# Patient Record
Sex: MALE | Race: WHITE | NOT HISPANIC OR LATINO | Employment: OTHER | URBAN - METROPOLITAN AREA
[De-identification: names, ages, dates, MRNs, and addresses within clinical notes are randomized per-mention and may not be internally consistent; named-entity substitution may affect disease eponyms.]

---

## 2019-02-27 ENCOUNTER — OFFICE VISIT (OUTPATIENT)
Dept: FAMILY MEDICINE CLINIC | Facility: CLINIC | Age: 54
End: 2019-02-27
Payer: COMMERCIAL

## 2019-02-27 VITALS
RESPIRATION RATE: 18 BRPM | OXYGEN SATURATION: 96 % | WEIGHT: 266.2 LBS | HEART RATE: 74 BPM | TEMPERATURE: 98.6 F | SYSTOLIC BLOOD PRESSURE: 122 MMHG | DIASTOLIC BLOOD PRESSURE: 72 MMHG

## 2019-02-27 DIAGNOSIS — H61.22 IMPACTED CERUMEN OF LEFT EAR: ICD-10-CM

## 2019-02-27 DIAGNOSIS — E78.2 MIXED HYPERLIPIDEMIA: ICD-10-CM

## 2019-02-27 DIAGNOSIS — L30.9 ECZEMA, UNSPECIFIED TYPE: Primary | ICD-10-CM

## 2019-02-27 DIAGNOSIS — Z13.1 SCREENING FOR DIABETES MELLITUS: ICD-10-CM

## 2019-02-27 DIAGNOSIS — Z12.5 SCREENING FOR PROSTATE CANCER: ICD-10-CM

## 2019-02-27 DIAGNOSIS — Z12.11 SCREENING FOR COLON CANCER: ICD-10-CM

## 2019-02-27 DIAGNOSIS — F41.9 ANXIETY: ICD-10-CM

## 2019-02-27 DIAGNOSIS — Z13.220 NEED FOR LIPID SCREENING: ICD-10-CM

## 2019-02-27 DIAGNOSIS — Z11.59 NEED FOR HEPATITIS C SCREENING TEST: ICD-10-CM

## 2019-02-27 PROCEDURE — 1036F TOBACCO NON-USER: CPT | Performed by: FAMILY MEDICINE

## 2019-02-27 PROCEDURE — 69210 REMOVE IMPACTED EAR WAX UNI: CPT | Performed by: FAMILY MEDICINE

## 2019-02-27 PROCEDURE — 99204 OFFICE O/P NEW MOD 45 MIN: CPT | Performed by: FAMILY MEDICINE

## 2019-02-27 RX ORDER — BETAMETHASONE DIPROPIONATE 0.5 MG/G
OINTMENT TOPICAL 2 TIMES DAILY
Qty: 30 G | Refills: 0 | Status: SHIPPED | OUTPATIENT
Start: 2019-02-27 | End: 2019-03-06 | Stop reason: SDUPTHER

## 2019-02-27 RX ORDER — HYDROXYZINE HYDROCHLORIDE 10 MG/1
25 TABLET, FILM COATED ORAL
COMMUNITY
End: 2019-02-27 | Stop reason: SDUPTHER

## 2019-02-27 RX ORDER — HYDROXYZINE HYDROCHLORIDE 25 MG/1
25 TABLET, FILM COATED ORAL
Qty: 30 TABLET | Refills: 2 | Status: SHIPPED | OUTPATIENT
Start: 2019-02-27 | End: 2019-03-04 | Stop reason: SDUPTHER

## 2019-02-27 RX ORDER — FLUOXETINE HYDROCHLORIDE 20 MG/1
20 CAPSULE ORAL DAILY
COMMUNITY
Start: 2018-04-26 | End: 2019-02-27 | Stop reason: SDUPTHER

## 2019-02-27 RX ORDER — BETAMETHASONE DIPROPIONATE 0.5 MG/G
OINTMENT TOPICAL 2 TIMES DAILY
Qty: 30 G | Refills: 0 | Status: SHIPPED | OUTPATIENT
Start: 2019-02-27 | End: 2019-02-27 | Stop reason: SDUPTHER

## 2019-02-27 RX ORDER — ROSUVASTATIN CALCIUM 5 MG/1
5 TABLET, COATED ORAL DAILY
Qty: 30 TABLET | Refills: 2 | Status: SHIPPED | OUTPATIENT
Start: 2019-02-27 | End: 2019-03-05 | Stop reason: SDUPTHER

## 2019-02-27 RX ORDER — TRIAMCINOLONE ACETONIDE 1 MG/G
CREAM TOPICAL 2 TIMES DAILY
COMMUNITY
End: 2019-03-21

## 2019-02-27 RX ORDER — ROSUVASTATIN CALCIUM 5 MG/1
5 TABLET, COATED ORAL DAILY
COMMUNITY
Start: 2018-04-26 | End: 2019-02-27 | Stop reason: SDUPTHER

## 2019-02-27 RX ORDER — FLUOXETINE HYDROCHLORIDE 40 MG/1
40 CAPSULE ORAL DAILY
Qty: 30 CAPSULE | Refills: 1 | Status: SHIPPED | OUTPATIENT
Start: 2019-02-27 | End: 2019-09-18

## 2019-02-27 RX ORDER — FLUOXETINE 10 MG/1
10 CAPSULE ORAL
COMMUNITY
Start: 2018-04-26 | End: 2019-02-27 | Stop reason: ALTCHOICE

## 2019-02-27 RX ORDER — FLUOXETINE HYDROCHLORIDE 40 MG/1
40 CAPSULE ORAL DAILY
Qty: 30 CAPSULE | Refills: 1 | Status: SHIPPED | OUTPATIENT
Start: 2019-02-27 | End: 2019-02-27 | Stop reason: SDUPTHER

## 2019-02-27 NOTE — PROGRESS NOTES
Assessment/Plan:    No problem-specific Assessment & Plan notes found for this encounter  Diagnoses and all orders for this visit:    Eczema, unspecified type  After discussing risks and benefits of medication along with side effects will refill hydroxyzine and start betamethasone at this time  -     hydrOXYzine HCL (ATARAX) 25 mg tablet; Take 1 tablet (25 mg total) by mouth daily at bedtime for 30 days  -     betamethasone, augmented, (DIPROLENE) 0 05 % ointment; Apply topically 2 (two) times a day    Anxiety  After discussing risks and benefits of medication along with side effects  Will increase Prozac to 40 mg p o  Once daily   -     FLUoxetine (PROzac) 40 MG capsule; Take 1 capsule (40 mg total) by mouth daily for 30 days    Mixed hyperlipidemia  -     rosuvastatin (CRESTOR) 5 mg tablet; Take 1 tablet (5 mg total) by mouth daily for 30 days    Screening for diabetes mellitus  -     Comprehensive metabolic panel; Future    Need for lipid screening  -     Lipid panel; Future    Need for hepatitis C screening test  -     Hepatitis C antibody; Future    Screening for prostate cancer  -     PSA, Total Screen; Future    Screening for colon cancer  -     Cologuard; Future    Impacted cerumen of left ear  See Procedure note  Other orders  -     Discontinue: FLUoxetine (PROZAC) 20 mg capsule; Take 20 mg by mouth daily  -     Discontinue: rosuvastatin (CRESTOR) 5 mg tablet; Take 5 mg by mouth daily  -     Discontinue: FLUoxetine (PROzac) 10 mg capsule; Take 10 mg by mouth  -     Discontinue: hydrOXYzine HCL (ATARAX) 10 mg tablet; Take 25 mg by mouth daily at bedtime  -     triamcinolone (KENALOG) 0 1 % cream; Apply topically 2 (two) times a day      Follow up in 1 month or as needed    Subjective:      Patient ID: Shanika Walters is a 52 y o  male  Patient is here to establish care  He has a history of eczema and currently has a rash on his chest bilateral arms as well as back    He has taken medications in the past including various creams and topical treatments  Most recently he tried triamcinolone cream which works somewhat for his symptoms  He has itching  associated with the eczema and takes hydroxyzine for his symptoms  He has a history of hyperlipidemia and takes cholesterol medication at this time  He denies any side effects from them  He has a history of anxiety on he does take Prozac 30 mg p o  Once daily for his symptoms at this time  He said that his anxiety has been increased recently and would like his medication to be adjusted  [place] [facility] He is also having left ear complaints, has decreased hearing from his left ear he does have a history of ear wax in the past   He has been applying hydrogen peroxide with only minimum relief of his symptoms  The following portions of the patient's history were reviewed and updated as appropriate:   He  has a past medical history of Anxiety, Eczema, and Hypercholesterolemia  He   Patient Active Problem List    Diagnosis Date Noted    Eczema 02/27/2019    Anxiety 02/27/2019    Mixed hyperlipidemia 02/27/2019    Screening for diabetes mellitus 02/27/2019    Need for lipid screening 02/27/2019    Need for hepatitis C screening test 02/27/2019    Screening for prostate cancer 02/27/2019    Screening for colon cancer 02/27/2019    Impacted cerumen of left ear 02/27/2019     He  has no past surgical history on file  His family history includes Cancer in his father and mother; Coronary artery disease in his father and mother; Mental illness in his mother; Substance Abuse in his father and mother  He  reports that he has never smoked  He has never used smokeless tobacco  He reports that he drank alcohol  He reports that he does not use drugs    Current Outpatient Medications   Medication Sig Dispense Refill    FLUoxetine (PROzac) 40 MG capsule Take 1 capsule (40 mg total) by mouth daily for 30 days 30 capsule 1    hydrOXYzine HCL (ATARAX) 25 mg tablet Take 1 tablet (25 mg total) by mouth daily at bedtime for 30 days 30 tablet 2    rosuvastatin (CRESTOR) 5 mg tablet Take 1 tablet (5 mg total) by mouth daily for 30 days 30 tablet 2    triamcinolone (KENALOG) 0 1 % cream Apply topically 2 (two) times a day      betamethasone, augmented, (DIPROLENE) 0 05 % ointment Apply topically 2 (two) times a day 30 g 0     No current facility-administered medications for this visit  Current Outpatient Medications on File Prior to Visit   Medication Sig    triamcinolone (KENALOG) 0 1 % cream Apply topically 2 (two) times a day    [DISCONTINUED] FLUoxetine (PROzac) 10 mg capsule Take 10 mg by mouth    [DISCONTINUED] FLUoxetine (PROZAC) 20 mg capsule Take 20 mg by mouth daily    [DISCONTINUED] hydrOXYzine HCL (ATARAX) 10 mg tablet Take 25 mg by mouth daily at bedtime    [DISCONTINUED] rosuvastatin (CRESTOR) 5 mg tablet Take 5 mg by mouth daily     No current facility-administered medications on file prior to visit  He is allergic to medical tape       Review of Systems   Constitutional: Negative for activity change, appetite change, fatigue and fever  HENT: Positive for hearing loss  Negative for congestion and ear discharge  Respiratory: Negative for cough and shortness of breath  Cardiovascular: Negative for chest pain and palpitations  Gastrointestinal: Negative for diarrhea and nausea  Musculoskeletal: Negative for arthralgias and back pain  Skin: Positive for color change and rash  Neurological: Negative for dizziness and headaches  Psychiatric/Behavioral: Positive for agitation  Negative for behavioral problems  The patient is nervous/anxious and is hyperactive  Objective:      /72 (BP Location: Left arm, Patient Position: Sitting, Cuff Size: Adult)   Pulse 74   Temp 98 6 °F (37 °C) (Tympanic)   Resp 18   Wt 121 kg (266 lb 3 2 oz)   SpO2 96%          Physical Exam   Constitutional: He is oriented to person, place, and time   He appears well-developed and well-nourished  No distress  HENT:   Left TM not visualized due to ear wax  Right ear canal and TM normal   Eyes: Pupils are equal, round, and reactive to light  No scleral icterus  Cardiovascular: Normal rate, regular rhythm and normal heart sounds  No murmur heard  Pulmonary/Chest: Effort normal and breath sounds normal  No respiratory distress  He has no wheezes  Abdominal: Soft  Bowel sounds are normal  He exhibits no distension  There is no tenderness  Neurological: He is alert and oriented to person, place, and time  Skin: Skin is warm and dry  Rash noted  He is not diaphoretic  There is erythema  Psychiatric: He has a normal mood and affect  Ear cerumen removal  Date/Time: 2/27/2019 4:32 PM  Performed by: Valarie Mora MD  Authorized by: Valarie Mora MD     Patient location:  Clinic  Other Assisting Provider: No    Consent:     Consent obtained:  Verbal and written    Consent given by:  Patient    Risks discussed:  Bleeding, infection, incomplete removal, dizziness, pain and TM perforation    Alternatives discussed:  No treatment  Universal protocol:     Patient identity confirmed:  Verbally with patient  Procedure details:     Local anesthetic:  None    Location:  L ear    Procedure type: curette      Approach:  External and internal  Sedation:     Sedation type: Anxiolysis  Post-procedure details:     Complication:  None    Hearing quality:  Improved    Patient tolerance of procedure:   Tolerated well, no immediate complications

## 2019-03-04 DIAGNOSIS — L30.9 ECZEMA, UNSPECIFIED TYPE: ICD-10-CM

## 2019-03-04 RX ORDER — HYDROXYZINE HYDROCHLORIDE 25 MG/1
25 TABLET, FILM COATED ORAL
Qty: 90 TABLET | Refills: 3 | Status: SHIPPED | OUTPATIENT
Start: 2019-03-04 | End: 2019-09-18 | Stop reason: SDUPTHER

## 2019-03-05 DIAGNOSIS — E78.2 MIXED HYPERLIPIDEMIA: ICD-10-CM

## 2019-03-05 RX ORDER — ROSUVASTATIN CALCIUM 5 MG/1
5 TABLET, COATED ORAL DAILY
Qty: 30 TABLET | Refills: 2 | Status: SHIPPED | OUTPATIENT
Start: 2019-03-05 | End: 2019-03-06 | Stop reason: SDUPTHER

## 2019-03-05 RX ORDER — ROSUVASTATIN CALCIUM 5 MG/1
5 TABLET, COATED ORAL DAILY
Qty: 90 TABLET | Refills: 2 | OUTPATIENT
Start: 2019-03-05

## 2019-03-06 DIAGNOSIS — E78.2 MIXED HYPERLIPIDEMIA: ICD-10-CM

## 2019-03-06 DIAGNOSIS — L30.9 ECZEMA, UNSPECIFIED TYPE: ICD-10-CM

## 2019-03-06 RX ORDER — ROSUVASTATIN CALCIUM 5 MG/1
5 TABLET, COATED ORAL DAILY
Qty: 90 TABLET | Refills: 3 | Status: SHIPPED | OUTPATIENT
Start: 2019-03-06 | End: 2019-03-08 | Stop reason: SDUPTHER

## 2019-03-06 RX ORDER — BETAMETHASONE DIPROPIONATE 0.5 MG/G
OINTMENT TOPICAL 2 TIMES DAILY
Qty: 30 G | Refills: 0 | Status: SHIPPED | OUTPATIENT
Start: 2019-03-06 | End: 2020-09-23

## 2019-03-08 DIAGNOSIS — E78.2 MIXED HYPERLIPIDEMIA: ICD-10-CM

## 2019-03-08 RX ORDER — ROSUVASTATIN CALCIUM 5 MG/1
5 TABLET, COATED ORAL DAILY
Qty: 90 TABLET | Refills: 3 | Status: SHIPPED | OUTPATIENT
Start: 2019-03-08 | End: 2019-09-18 | Stop reason: SDUPTHER

## 2019-03-21 ENCOUNTER — OFFICE VISIT (OUTPATIENT)
Dept: DERMATOLOGY | Facility: CLINIC | Age: 54
End: 2019-03-21
Payer: COMMERCIAL

## 2019-03-21 VITALS — HEIGHT: 76 IN | BODY MASS INDEX: 32.42 KG/M2 | TEMPERATURE: 97.8 F | WEIGHT: 266.2 LBS

## 2019-03-21 DIAGNOSIS — D22.9 MULTIPLE BENIGN MELANOCYTIC NEVI: ICD-10-CM

## 2019-03-21 DIAGNOSIS — L20.89 OTHER ATOPIC DERMATITIS: Primary | ICD-10-CM

## 2019-03-21 LAB
ALBUMIN SERPL-MCNC: 4.3 G/DL (ref 3.5–5.5)
ALBUMIN/GLOB SERPL: 2.3 {RATIO} (ref 1.2–2.2)
ALP SERPL-CCNC: 54 IU/L (ref 39–117)
ALT SERPL-CCNC: 16 IU/L (ref 0–44)
AST SERPL-CCNC: 16 IU/L (ref 0–40)
BILIRUB SERPL-MCNC: 0.8 MG/DL (ref 0–1.2)
BUN SERPL-MCNC: 20 MG/DL (ref 6–24)
BUN/CREAT SERPL: 27 (ref 9–20)
CALCIUM SERPL-MCNC: 8.8 MG/DL (ref 8.7–10.2)
CHLORIDE SERPL-SCNC: 108 MMOL/L (ref 96–106)
CHOLEST SERPL-MCNC: 154 MG/DL (ref 100–199)
CO2 SERPL-SCNC: 23 MMOL/L (ref 20–29)
CREAT SERPL-MCNC: 0.73 MG/DL (ref 0.76–1.27)
GLOBULIN SER-MCNC: 1.9 G/DL (ref 1.5–4.5)
GLUCOSE SERPL-MCNC: 101 MG/DL (ref 65–99)
HCV AB S/CO SERPL IA: 0.1 S/CO RATIO (ref 0–0.9)
HDLC SERPL-MCNC: 38 MG/DL
LDLC SERPL CALC-MCNC: 94 MG/DL (ref 0–99)
POTASSIUM SERPL-SCNC: 4.8 MMOL/L (ref 3.5–5.2)
PROT SERPL-MCNC: 6.2 G/DL (ref 6–8.5)
PSA SERPL-MCNC: 1.1 NG/ML (ref 0–4)
SL AMB EGFR AFRICAN AMERICAN: 121 ML/MIN/1.73
SL AMB EGFR NON AFRICAN AMERICAN: 104 ML/MIN/1.73
SL AMB VLDL CHOLESTEROL CALC: 22 MG/DL (ref 5–40)
SODIUM SERPL-SCNC: 142 MMOL/L (ref 134–144)
TRIGL SERPL-MCNC: 109 MG/DL (ref 0–149)

## 2019-03-21 PROCEDURE — 99202 OFFICE O/P NEW SF 15 MIN: CPT | Performed by: DERMATOLOGY

## 2019-03-21 RX ORDER — CLOBETASOL PROPIONATE 0.5 MG/G
CREAM TOPICAL
Qty: 120 G | Refills: 1 | Status: SHIPPED | OUTPATIENT
Start: 2019-03-21 | End: 2019-09-18 | Stop reason: ALTCHOICE

## 2019-03-21 NOTE — PATIENT INSTRUCTIONS
Start moisturizing a couple times a day with a topical cream  (Anything cream base)     -Eucerin Cream    -Cetaphil Cream, CeraVe cream     Start Eucrisa Ointment twice a day Monday to Friday for two months    Start Clobetasol Cream twice a day daily for two weeks, decrease to twice a day weekends only Saturday and Sunday

## 2019-03-21 NOTE — PROGRESS NOTES
[address] [facility] Note     Patient Name: Veronique Moura  FFGDB'M Date: 3/21/2019    Today's Chief Concerns:  [place] Concern #1:  Rash    Past Medical History:  Have you ever had or currently have any of the following medical conditions or treatments? · HIV/AIDS: No  · Hepatitis B: No  · Hepatitis C: No   · Diabetes: No  · Tuberculosis: No  · Biologic Therapy/Chemotherapy: No  · Organ or Bone Marrow Transplantation: No  · Radiation Treatment: No  · Cancer (If Yes, which types)- No      Have you ever had any of the following skin conditions? · Melanoma? (If Yes, please provide more detail)- No  · Basal Cell Carcinoma: No  · Squamous Cell Carcinoma: No  · Sebaceous Cell Carcinoma: No  · Merkel Cell Carcinoma: No  · Angiosarcoma: No  · Blistering Sunburns: No  · Eczema: No  · Psoriasis: No    Social History:    What is your current Smoking Status? Never smoker    What is/was your primary occupation?      What are your hobbies/past-times? Fishing     Family history:  Do any of your "first degree relatives" (parent, brother, sister, or child) have any of the following conditions? · Melanoma? (If Yes, which relatives?) No  · Eczema: No  · Asthma: Yes  · Hay Fever/Seasonal Allergies: Yes  · Psoriasis: No  · Arthritis: No  · Thyroid Problems: No  · Lupus/Connective Tissue Disease: No  · Diabetes: No  · Stroke: No  · Blood Clots: No  · IBD/Crohn's/Ulcerative Colitis: No  · Vitiligo: No  · Scarring/Keloids: No  · Severe Acne: Yes  · Pancreatic Cancer: Yes  · Other known Skin Condition? If Yes, what condition and which relatives?   No    Current Medications:    Current Outpatient Medications:     betamethasone, augmented, (DIPROLENE) 0 05 % ointment, Apply topically 2 (two) times a day, Disp: 30 g, Rfl: 0    FLUoxetine (PROzac) 40 MG capsule, Take 1 capsule (40 mg total) by mouth daily for 30 days, Disp: 30 capsule, Rfl: 1    hydrOXYzine HCL (ATARAX) 25 mg tablet, Take 1 tablet (25 mg total) by mouth daily at bedtime for 30 days, Disp: 90 tablet, Rfl: 3    rosuvastatin (CRESTOR) 5 mg tablet, Take 1 tablet (5 mg total) by mouth daily for 30 days, Disp: 90 tablet, Rfl: 3    Specific Alerts:    Are you pregnant or planning to become pregant? NA    Are you currently or planning to be nursing or breast feeding? NA    Allergies   Allergen Reactions    Medical Tape        May we call your Preferred Phone number to discuss your specific medical information? Yes    May we leave a detailed message that includes your specific medical information? Yes    Have you traveled outside of the [facility] in the past 3 months? No    Do you currently have a pacemaker or defibrillator? No    Do you have any artificial heart valves, joints, plates, screws, rods, stents, pins, etc? No   - If Yes, were any placed within the last 2 years? Do you require any medications prior to a surgical procedure? No   - If Yes, for which procedure? n/a   - If Yes, what medications to you require? n/a    Are you taking any medications that cause you to bleed more easily ("blood thinners") No    Have you ever experienced a rapid heartbeat with epinephrine? No    Have you ever been treated with "gold" (gold sodium thiomalate) therapy? No      Review of Systems:  Have you recently had or currently have any of the following?     · Fever or chills: No  · Night Sweats: No  · Headaches: No  · Weight Gain: No  · Weight Loss: No  · Blurry Vision: No  · Nausea: No  · Vomiting: No  · Diarrhea: No  · Blood in Stool: No  · Abdominal Pain: No  · Itchy Skin: Yes  · Painful Joints: No  · Swollen Joints: Yes  · Muscle Pain: No  · Irregular Mole: No  · Sun Burn: No  · Dry Skin: Yes  · Skin Color Changes: No  · Scar or Keloid: No  · Cold Sores/Fever Blisters: No  · Bacterial Infections/MRSA: No  · Anxiety: Yes  · Depression: No  · Suicidal or Homicidal Thoughts: No      FULL ORGAN SYSTEM SKIN EXAM (SKIN)  Hair, Scalp, Ears, Face Normal except as noted below in Assessment   Neck, Cervical Chain Nodes Normal except as noted below in Assessment   Right Arm/Hand/Fingers Normal except as noted below in Assessment   Left Arm/Hand/Fingers Normal except as noted below in Assessment   Chest/Breasts/Axillae Normal except as noted below in Assessment   Abdomen, Umbilicus Normal except as noted below in Assessment   Back/Spine Normal except as noted below in Assessment   Groin/Genitalia/Buttocks    Right Leg, Foot, Toes    Left Leg, Foot, Toes       Was chaperone present for exam? Yes    Did the patient specifically ask the Dermatologist to NOT examine "under-the-bra" or to "not remove the shirt" for privacy's sake? Yes    Did the patient specifically ask the Dermatologist to NOT examine "under-the-underwear" for privacy's sake? Yes    Did Dermatologist specifically  patient on the importance of a full skin exam to be sure that nothing is missed clinically? Yes    Vitals:    03/21/19 1125   Temp: 97 8 °F (36 6 °C)   TempSrc: Tympanic   Weight: 121 kg (266 lb 3 2 oz)   Height: 6' 3 6" (1 92 m)         ASSESSMENT AND PLAN BY DIAGNOSIS    1  Atopic Dermatitis (commonly referred to as [place])    PHYSICAL EXAM:   Anatomic Location Affected:  Proximal Forearms>Distal Forearms; Chest/Back>Abdomen   Morphological Description:  Scattered eczematous mildly pink plaques   Pertinent Positives:   Pertinent Negatives: No regional lymphadenopathy; no lesions in sun covered areas per patient (he deferred full skin exam)    HISTORY OF PRESENT CONDITION:   Duration:  How long has this been an issue for you?    o Many years   Location Affected:  Where on the body have you noticed these sort of lesions?   o Chest and arms mostly   Quality:  Is there any bleeding, pain, itch, burning/irritation, or redness associated with the skin lesion?    o Itchy; extremely    Also, he is scratching a great deala nd cannot sleep through the night   Severity:  Describe any bleeding, pain, itch, burning/irritation, or redness on a scale of 1 to 10 (with 10 being the worst)  o 8-10/10   Timing:  Do the lesions seem to be there pretty constantly or do you notice them more at specific times throughout the day?    o Constantly but worse at night   Context:  Have you ever noticed that these skin lesions seem to be associated with specific activities you do or clothing that you wear or weight gain?    o He says he is allergic to several metals per [facility] testing   Modifying Factors:  What have you tried to do to make the problem better?    o Ultra-potent topical steroids; oral steroids   Associated Signs and Symptoms:  Are these skin lesions associated with any of the following:  o DERM ASSOCIATED SIGNS AND SYMPTOMS: Redness, Itching and Scratching, Bleeding, Skin color changes, Joint Pain, Increased Anxiety and Prevent you from things you enjoy doing     ASSESSMENT AND PLAN:   Atopic Dermatitis is a chronic, itchy skin condition that is very common in children but may occur at any age  It is also known as eczema or atopic eczema   It is the most common form of dermatitis  Atopic dermatitis usually occurs in people who have an atopic tendency    This means they may develop any or all of these closely linked conditions: Atopic dermatitis, asthma, hay fever (allergic rhinitis), eosinophilic esophagitis, and gastroenteritis  Often these conditions run within families with a parent, child or sibling also affected  A family history of asthma, eczema or hay fever is particularly useful in diagnosing atopic dermatitis in infants  Atopic dermatitis arises because of a complex interaction of genetic and environmental factors  These include defects in skin barrier function making the skin more susceptible to irritation by soap and other contact irritants, the weather, temperature and non-specific triggers  There is also an element of immune system dysregulation that is often present    By definition, it is chronic and has a "waxing-waning" nature; flares should be expected but with good education and treatment strategies can be minimized  Some specific tips we discussed:   Dry skin care   Usingonly mild cleansers (hypoallergenic and without fragrances) and fragrance free detergent (not unscented products which contain a masking agent); we discussed avoiding irritants/fragranced products   The importance of regular application of moisturizers daily (at least 3 times a day)   The known and theoretical side effects of steroids at length, including but not limited to atrophy of skin and increased pressure in eye (glaucoma) and clouding of the eye's lens (cataracts) if used in or around the eye for extended durations   The specific over-the-counter interventions and medications   Side effects, risks and benefits of topical and oral medications discussed   After lengthy discussion of etiology and treatment options, we decided to implement the following personalized treatment plan:      YOUR PERSONALIZED ECZEMA ACTION PLAN    FOR ACUTE FLARING    1) Antimicrobials  None      2) Anti-Inflammatories  During periods of acute flaring, apply the Clobetasol 0 05% cream to the body TWICE A DAY for 2 weeks straight  To give you an idea of how much medication to use: You should be using at least two full 30-gram tubes of this medication EACH WEEK  Do NOT apply this stronger medication to the face or groin area as the skin is too thin and at greater risk for side effects  Wait 15-30 minutes to allow Clobetasol to absorb  Then, apply Eucrisa ointment to body TWICE A DAY for 2 weeks straight  3) Moisturizers  Apply Eucerin cream or CeraVe cream at least 3 times a day  It is best to use moisturizers and prescription medications at DIFFERENT times during the day (ideally, about 30 minutes apart)   If you must apply your prescription medication and your moisturizer at the same time, then ALWAYS apply the prescription medication FIRST (i e , directly to the skin); as we discussed, this allows the prescription medication to reach the skin without being blocked by the thick moisturizer! EDUCATION AS INTERVENTION! WHAT IS ATOPIC DERMATITIS? Atopic dermatitis (also called eczema) is a condition of the skin where the skin is dry, red, and itchy  The main function of the skin is to provide a barrier from the environment and is also the first defense of the immune system  In atopic dermatitis the skin barrier is decreased or disrupted, and the skin is easily irritated  As a result, moisture escapes the skin more easily, and environmental allergens and microbes can enter the skin more easily  Consequently, the skin's immune system is altered  If there are increased allergic type cells in the skin, the skin may become red and hyper-excitable   This leads to itching and a subsequent rash  WHY DO PEOPLE GET ATOPIC DERMATITIS? There is no single answer because many factors are involved  It is likely a combination of genetic makeup and environmental triggers and/or exposures  Excessive drying or sweating of the skin, Irritating soaps, dust mites, and pet dander are some of the more common triggers  There is no blood test that can be done to confirm this diagnosis  The history and appearance of the skin is usually sufficient for a diagnosis  However, in some cases if the rash does not fit with the history or respond appropriately to treatment, a skin biopsy may be helpful  Many children do outgrow atopic dermatitis or get better; however, many continue to have sensitive skin into adulthood  Asthma and hay fever are often seen in many patients with atopic dermatitis; however, asthma flares do not necessarily occur at the same time as skin flares  PREVENTING FLARES OF ATOPIC DERMATITIS  The first step is to maintain the skin's barrier function  Keep the skin well moisturized    Avoid irritants and triggers  Use prescribed medicine when there are red or rough areas to help the skin to return to normal as quickly as possible  Try to limit scratching  If you keep the skin well moisturized, and avoid coming in contact with things you know irritate your child's skin, there will be less flares  However, some flares of atopic dermatitis are beyond your control  You should work with your health care provider to come up with a plan that minimizes flares while minimizing long term use of medications that suppress the immune system  WHAT ARE SOME OF THE TRIGGERS? Triggers are different for different people  The most common triggers are:   Heat and sweat for some individuals, cold weather for others   House dust mites, pet fur   Wool; synthetic fabrics like nylon; dyed fabrics   Tobacco smoke    Fragrances in: shampoos, soaps, lotions, laundry detergents, fabric softeners   Saliva or prolonged exposure to water  WHAT ABOUT FOOD ALLERGIES? This is a very controversial topic, as many believe that food allergies are responsible for skin flares  In some cases, specific foods may cause worsening of atopic dermatitis; however this occurs in a minority of cases and usually happens within a few hours of ingestion  While food allergy is more common in children with eczema, foods are specific triggers for flares in only a small percentage of children  If you notice that the skin flares after certain foods you can see if eliminating one food at time makes a difference, as long as your child can still enjoy a well-balanced diet  There are blood (RAST) and skin (PRICK) tests that can check for allergies, but they are often positive in children who are not truly allergic  Therefore it is important that you work with your allergist and dermatologist to determine which foods are relevant and causing true symptoms    Extreme food elimination diets without the guidance of your doctor, which have become more popular in recent years, may even result in worsening of the skin rash due to malnutrition and avoidance of essential nutrients  TREATMENT  Treatments are aimed at minimizing exposure to irritating factors and decreasing  the skin inflammation which results in an itchy rash  There are many different treatment options, which depend on your child's rash, its location, and severity  Topical treatments include corticosteroids and steroid-like creams such as Protopic, Elidel, and Eucrisa, which are believed to not thin the skin  Please read the discussions below regarding risks and benefits of all of these creams  Occasionally bacterial or viral infections can occur which flare the skin and require oral and/or topical antibiotics or antivirals  In some cases bleach baths 2-3 times weekly can be helpful to prevent recurrent infection  For severe disease, strong oral medications such as corticosteroids, methotrexate or azathioprine (Imuran) may be needed  These medications require close monitoring and follow-up  You should discuss the risks/ benefits/alternatives of these medications with your health care provider to come up with the best treatment plan for your child  1) Use moisturizer all over the entire body at least THREE TIMES a day  This keeps the skin moisturized to restore the barrier function  Find a cream or ointment that your child likes - this is the most important  The medicines do not work in the bottle  The thicker the moisturizer, generally the better barrier it provides  Ointments often moisturize better than creams; and creams work better than lotions  Lotions are more useful during the summer when thick greasy ointments are uncomfortable  If you put moisturizer on the skin after bathing, while the skin is damp, it is twice as effective  The moisturizer provides a seal holding the water in the skin    You may bathe your child in warm - not hot - water, for short periods of time (no more than 5-10 minutes at a time) once a day if they like  Lightly pat your child dry with a towel and, while the skin is still damp, (within 3 minutes) apply a moisturizer from head to toe  If your child is using a medicated cream, apply it and allow it to absorb completely BEFORE you apply the moisturizer  2) Apply the prescription medication TWICE A DAY to only the red, rough areas on the skin OR AS [place]  Put the medication on your fingers and gently rub it into the areas  Usually the medicine will help an area within a few days time  Try to put the medicine on for two days after you have noticed that the redness is no longer present; this will help the redness from returning  The severity of the rash and the strength and usage of the medication will determine how quickly you see improvement  It is important that you do not overuse steroid creams, and if you notice a thin, shiny appearance to the skin or broken blood vessels, you should stop using the cream and consult your health care provider regarding possible overuse/overthinning of the skin  The face, armpits and groin have particularly thin and sensitive skin and are therefore most at risk for bad results if steroids are over-used in these sites  3) Avoid triggers  Some children have specific things that trigger itching and rashes, while others may have none that can be identified  It may require a little bit of trial and error to see what applies to your child  Also, triggers can change over time for your child  The most common triggers are listed above; start with these  Avoid the use of fabric softeners in the washing machine or dryer sheets (unless they are fragrance-free)  Try to use laundry detergents, soaps and shampoos that are fragrance-free  You may find it helpful to double-rinse your clothes    Some children are sensitive to house dust mites and they may benefit from a plastic mattress wrap   While food allergy is more common in children with eczema, foods are specific triggers for flares in only a small percentage of children  If you notice that the skin flares after certain foods you can see if eliminating one food at time makes a difference, as long as your child can still enjoy a well-balanced diet  4) Consider using a medication like an anti-histamine by mouth to help control the itching  Scratching only makes the skin more reactive and the barrier function even more disrupted  It can cause both children and their parents to lose sleep! There are different types of anti-itch medications  Some cause more drowsiness than others  Both types are acceptable depending on your child and your preference  Start with Benadryl and if that does not work, ask for a prescription antihistamine      5) About the prescription creams:  Corticosteroid creams and ointments (generally things with "-one" or "edlio" on the end of their names): The strength of the cream or ointment depends on the name of the active ingredient  The numbers at the end do not indicate the relative strength  Thus triamcinolone 0 1% ointment, considered a mid-strength corticosteroid, is much stronger than hydrocortisone 1% even though the number following the name is much lower  Topical corticosteroids are very effective in treating atopic dermatitis  When used in the manner prescribed (to rashy areas of skin and for no more than a few weeks at a time to any one area) they are very safe  These are corticosteroids and are anti-inflammatory, not the anabolic steroids like those used illegally by some athletes  Topical non-steroid creams and ointments (immunomodulators): These creams and ointments are also called topical calcineurin inhibitors (TCIs)  These include Protopic ointment and Elidel cream  Crisaborole 2% Arjun Cao) is a prescription ointment that targets an enzyme called PDE4 (phosphodiesterase 4)    It is used on the skin topically to treat mild-to-moderate eczema in adults and children 3years of age and older  In total, these nonsteroidal prescriptions are used to help decrease itching and redness in the skin  They are not as strong as most steroid creams; however, it is believed that they do not thin the skin when overused  They are generally used as second-line medications, though they may be used alone or in conjunction with topical steroids  In sensitive areas such as the face, underarms or groin, they are often recommended  They can sting inflamed skin, but are generally well tolerated once the skin is healing  The FDA placed a black-box warning on both Elidel and Protopic in 2006 based on animal studies using the medications  Some animals developed skin cancer and lymphoma  Subsequently, the FDA released a statement that there is no causal relationship between the two medications and cancer  Because of this concern, there are ongoing studies to evaluate this relationship in humans  So far, there are studies that support the safety of these medications  One showed that the rates of cancer in patient using these medications topically were less than the rates of the general population and another showed that in patient's using the medication over a large area of the body, the levels of the medication in the blood was undetectable  As for Eucrisa, this product is only approved for the topical treatment of mild-to-moderate eczema in patients 3years of age and older; use of the medication in kids younger than 2 is considered off label and has not been formally studied  Burning and stinging are the most commonly reported side effects of this medication  Rarely, this product has been known to cause hives and hypersensitivity reactions; discontinue its use if you develop severe itching, swelling, or redness in the area of application        2  MELANOCYTIC NEVI ("Moles")    PHYSICAL EXAM:   Anatomic Location Affected:  Mostly on sun-exposed areas of the back, chest and face   Morphological Description:  Scattered, 1-4mm round to ovid, symmetrical-appearing, even-bordered, brown to dark brown macules and papules, mostly in sun-exposed areas   Pertinent Positives:   Pertinent Negatives:  No regional lymphadenopathy    HISTORY OF PRESENT CONDITION:   Duration:  How long has this been an issue for you?    o Several years   Location Affected:  Where on the body is this affecting you?    o Face and back   Quality:  Is there any bleeding, pain, itch, burning/irritation, or redness associated with the skin lesion?    o Denies   Severity:  Describe any bleeding, pain, itch, burning/irritation, or redness on a scale of 1 to 10 (with 10 being the worst)  o Deneis   Timing:  Does this condition seem to be there pretty constantly or do you notice it more at specific times throughout the day?    o Constantly   Context:  Have you ever noticed that this skin lesion seems to be associated with specific activities you do or clothing that you wear or products that you use or anything else?    o Denies   Modifying Factors:    o Anything that seems to make the condition worse?    - Deneis  o What have you tried to do to make the condition better? - Denies   Associated Signs and Symptoms:  Does this skin lesion seem to be associated with any of the following:  o Denies    ASSESSMENT AND PLAN:  Melanocytic nevi ("moles") are tan or brown, raised or flat areas of the skin which have an increased number of melanocytes  Melanocytes are the cells in our body which make pigment and account for skin color  Some moles are present at birth (I e , "congenital nevi"), while others come up later in life (i e , "acquired nevi")  The sun can stimulate the body to make more moles  Sunburns are not the only thing that triggers more moles  Chronic sun exposure can do it too       Clinically distinguishing a healthy mole from melanoma may be difficult, even for experienced dermatologists  The "ABCDE's" of moles have been suggested as a means of helping to alert a person to a suspicious mole and the possible increased risk of melanoma  The suggestions for raising alert are as follows:    Asymmetry: Healthy moles tend to be symmetric, while melanomas are often asymmetric  Asymmetry means if you draw a line through the mole, the two halves do not match in color, size, shape, or surface texture  Asymmetry can be a result of rapid enlargement of a mole, the development of a raised area on a previously flat lesion, scaling, ulceration, bleeding or scabbing within the mole  Any mole that starts to demonstrate "asymmetry" should be examined promptly by a board certified dermatologist      Border: Healthy moles tend to have discrete, even borders  The border of a melanoma often blends into the normal skin and does not sharply delineate the mole from normal skin  Any mole that starts to demonstrate "uneven borders" should be examined promptly by a board certified dermatologist      Color: Healthy moles tend to be one color throughout  Melanomas tend to be made up of different colors ranging from dark black, blue, white, or red  Any mole that demonstrates a color change should be examined promptly by a board certified dermatologist      Diameter: Healthy moles tend to be smaller than 0 6 cm in size; an exception are "congenital nevi" that can be larger  Melanomas tend to grow and can often be greater than 0 6 cm (1/4 of an inch, or the size of a pencil eraser)  This is only a guideline, and many normal moles may be larger than 0 6 cm without being unhealthy    Any mole that starts to change in size (small to bigger or bigger to smaller) should be examined promptly by a board certified dermatologist      Evolving: Healthy moles tend to "stay the same "  Melanomas may often show signs of change or evolution such as a change in size, shape, color, or elevation  Any mole that starts to itch, bleed, crust, burn, hurt, or ulcerate or demonstrate a change or evolution should be examined promptly by a board certified dermatologist       Dysplastic Nevi  Dysplastic moles are moles that fit the ABCDE rules of melanoma but are not identified as melanomas when examined under the microscope  They may indicate an increased risk of melanoma in that person  If there is a family history of melanoma, most experts agree that the person may be at an increased risk for developing a melanoma  Experts still do not agree on what dysplastic moles mean in patients without a personal or family history of melanoma  Dysplastic moles are usually larger than common moles and have different colors within it with irregular borders  The appearance can be very similar to a melanoma  Biopsies of dysplastic moles may show abnormalities which are different from a regular mole  Melanoma  Malignant melanoma is a type of skin cancer that can be deadly if it spreads throughout the body  The incidence of melanoma in the [place] is growing faster than any other cancer  Melanoma usually grows near the surface of the skin for a period of time, and then begins to grow deeper into the skin  Once it grows deeper into the skin, the risk of spread to other organs greatly increases  Therefore, early detection and removal of a malignant melanoma may result in a better chance at a complete cure; removal after the tumor has spread may not be as effective, leading to worse clinical outcomes such as death  The true rate of nevus transformation into a melanoma is unknown  It has been estimated that the lifetime risk for any acquired melanocytic nevus on any 21year-old individual transforming into melanoma by age [de-identified] is 0 03% (1 in 3,164) for men and 0 009% (1 in 10,800) for women       The appearance of a "new mole" remains one of the most reliable methods for identifying a malignant melanoma  Occasionally, melanomas appear as rapidly growing, blue-black, dome-shaped bumps within a previous mole or previous area of normal skin  Other times, melanomas are suspected when a mole suddenly appears or changes  Itching, burning, or pain in a pigmented lesion should increase suspicion, but most patients with early melanoma have no skin discomfort whatsoever  Melanoma can occur anywhere on the skin, including areas that are difficult for self-examination  Many melanomas are first noticed by other family members  Suspicious-looking moles may be removed for microscopic examination  You may be able to prevent death from melanoma by doing two simple things:    1  Try to avoid unnecessary sun exposure and protect your skin when it is exposed to the sun  People who live near the equator, people who have intermittent exposures to large amounts of sun, and people who have had sunburns in childhood or adolescence have an increased risk for melanoma  Sun sense and vigilant sun protection may be keys to helping to prevent melanoma  We recommend wearing UPF-rated sun protective clothing and sunglasses whenever possible and applying a moisturizer-sunscreen combination product (SPF 50+) such as Neutrogena Daily Defense to sun exposed areas of skin at least three times a day  2  Have your moles regularly examined by a board certified dermatologist AND by yourself or a family member/friend at home  We recommend that you have your moles examined at least once a year by a board certified dermatologist   Use your birthday as an annual reminder to have your "Birthday Suit" (I e , your skin) examined; it is a nice birthday gift to yourself to know that your skin is healthy appearing! Additionally, at-home self examinations may be helpful for detecting a possible melanoma  Use the ABCDEs we discussed and check your moles once a month at home          Patient understands to call in 2 weeks to check-in; if doing well, we will transition to "Maintenance Phase "

## 2019-03-27 ENCOUNTER — TELEPHONE (OUTPATIENT)
Dept: DERMATOLOGY | Facility: CLINIC | Age: 54
End: 2019-03-27

## 2019-03-27 ENCOUNTER — OFFICE VISIT (OUTPATIENT)
Dept: FAMILY MEDICINE CLINIC | Facility: CLINIC | Age: 54
End: 2019-03-27
Payer: COMMERCIAL

## 2019-03-27 VITALS
HEART RATE: 66 BPM | HEIGHT: 76 IN | SYSTOLIC BLOOD PRESSURE: 126 MMHG | WEIGHT: 260 LBS | BODY MASS INDEX: 31.66 KG/M2 | DIASTOLIC BLOOD PRESSURE: 76 MMHG | TEMPERATURE: 97.8 F | OXYGEN SATURATION: 98 % | RESPIRATION RATE: 18 BRPM

## 2019-03-27 DIAGNOSIS — L30.9 ECZEMA, UNSPECIFIED TYPE: ICD-10-CM

## 2019-03-27 DIAGNOSIS — L20.89 OTHER ATOPIC DERMATITIS: ICD-10-CM

## 2019-03-27 DIAGNOSIS — F41.9 ANXIETY: Primary | ICD-10-CM

## 2019-03-27 DIAGNOSIS — Z12.11 SCREENING FOR COLON CANCER: ICD-10-CM

## 2019-03-27 PROCEDURE — 96372 THER/PROPH/DIAG INJ SC/IM: CPT | Performed by: FAMILY MEDICINE

## 2019-03-27 PROCEDURE — 99214 OFFICE O/P EST MOD 30 MIN: CPT | Performed by: FAMILY MEDICINE

## 2019-03-27 RX ORDER — METHYLPREDNISOLONE ACETATE 40 MG/ML
40 INJECTION, SUSPENSION INTRA-ARTICULAR; INTRALESIONAL; INTRAMUSCULAR; SOFT TISSUE ONCE
Status: COMPLETED | OUTPATIENT
Start: 2019-03-27 | End: 2019-03-27

## 2019-03-27 RX ADMIN — METHYLPREDNISOLONE ACETATE 40 MG: 40 INJECTION, SUSPENSION INTRA-ARTICULAR; INTRALESIONAL; INTRAMUSCULAR; SOFT TISSUE at 16:10

## 2019-03-27 NOTE — TELEPHONE ENCOUNTER
[facility] called [facility] the [facility] for [facility]ucrisa 2% ointment and would like [facility] [address]  Called the [facility] pharmacy gave them the order for Eucrisa 2% twice a day Monday through Friday for 2 months straight to extremities and trunk ; avoiding face and groin with 3 refills  Called patient wife advised per the pharmacy they did not have the medication in stock and will have it ordered to have ready for tomorrow       Patient and wife to call the office if they have any questions or concerns

## 2019-03-27 NOTE — PROGRESS NOTES
Assessment/Plan:    No problem-specific Assessment & Plan notes found for this encounter  Diagnoses and all orders for this visit:    Anxiety  Stable controlled continue same medication and dose  Screening for colon cancer  -     Ambulatory referral to Gastroenterology; Future    Eczema, unspecified type  After discussing risks and benefits medication along with side effects will do Depo-Medrol 40 mg x1 intramuscular today  -     methylPREDNISolone acetate (DEPO-MEDROL) injection 40 mg      Follow up in 1 week if symptoms continue    Subjective:      Patient ID: Jennifer Arguelles is a 52 y o  male  Patient is here to follow-up for anxiety  His Prozac was increased to 40 mg p o  Once daily  He said that his symptoms are much improved at this time  He denies any side effects from the medications at this time  He is also here to follow-up for eczema all over his body  He said that it is associated with a lot of itching at this time  He went to see a dermatologist ordered the cream Eucrisa however he still waiting for the cream at this time he has not gotten the prescription as yet  He is requesting an Intramuscular steroid injection at this time  He denies any other complaints  The following portions of the patient's history were reviewed and updated as appropriate:   He  has a past medical history of Anxiety, Eczema, and Hypercholesterolemia  He   Patient Active Problem List    Diagnosis Date Noted    Eczema 02/27/2019    Anxiety 02/27/2019    Mixed hyperlipidemia 02/27/2019    Screening for diabetes mellitus 02/27/2019    Need for lipid screening 02/27/2019    Need for hepatitis C screening test 02/27/2019    Screening for prostate cancer 02/27/2019    Screening for colon cancer 02/27/2019    Impacted cerumen of left ear 02/27/2019     He  has no past surgical history on file    His family history includes Cancer in his father and mother; Coronary artery disease in his father and mother  He  reports that he has never smoked  He has never used smokeless tobacco  He reports that he drank alcohol  He reports that he does not use drugs  Current Outpatient Medications   Medication Sig Dispense Refill    clobetasol (TEMOVATE) 0 05 % cream Apply twice a day Saturday and Sundays for two weeks  to trunk and extremities,  avoid face and groin area  120 g 1    Crisaborole (EUCRISA) 2 % OINT Apply twice a day Monday to Friday for 2 months straight to trunk and extremities,  Avoiding  face and groin area  Failed ultra poteen  topical steroids 1 Tube 3    FLUoxetine (PROzac) 40 MG capsule Take 1 capsule (40 mg total) by mouth daily for 30 days 30 capsule 1    hydrOXYzine HCL (ATARAX) 25 mg tablet Take 1 tablet (25 mg total) by mouth daily at bedtime for 30 days 90 tablet 3    rosuvastatin (CRESTOR) 5 mg tablet Take 1 tablet (5 mg total) by mouth daily for 30 days 90 tablet 3    betamethasone, augmented, (DIPROLENE) 0 05 % ointment Apply topically 2 (two) times a day (Patient not taking: Reported on 3/27/2019) 30 g 0     No current facility-administered medications for this visit  Current Outpatient Medications on File Prior to Visit   Medication Sig    clobetasol (TEMOVATE) 0 05 % cream Apply twice a day Saturday and Sundays for two weeks  to trunk and extremities,  avoid face and groin area   Crisaborole (EUCRISA) 2 % OINT Apply twice a day Monday to Friday for 2 months straight to trunk and extremities,  Avoiding  face and groin area   Failed ultra poteen  topical steroids    FLUoxetine (PROzac) 40 MG capsule Take 1 capsule (40 mg total) by mouth daily for 30 days    hydrOXYzine HCL (ATARAX) 25 mg tablet Take 1 tablet (25 mg total) by mouth daily at bedtime for 30 days    rosuvastatin (CRESTOR) 5 mg tablet Take 1 tablet (5 mg total) by mouth daily for 30 days    betamethasone, augmented, (DIPROLENE) 0 05 % ointment Apply topically 2 (two) times a day (Patient not taking: Reported on 3/27/2019)     No current facility-administered medications on file prior to visit  He is allergic to medical tape       Review of Systems   Constitutional: Negative for activity change, appetite change, fatigue and fever  HENT: Negative for congestion and ear discharge  Respiratory: Negative for cough and shortness of breath  Cardiovascular: Negative for chest pain and palpitations  Gastrointestinal: Negative for diarrhea and nausea  Musculoskeletal: Negative for arthralgias and back pain  Skin: Positive for rash  Negative for color change  Neurological: Negative for dizziness and headaches  Psychiatric/Behavioral: Positive for agitation and behavioral problems  The patient is nervous/anxious and is hyperactive  Objective:      /76 (BP Location: Left arm, Patient Position: Sitting, Cuff Size: Adult)   Pulse 66   Temp 97 8 °F (36 6 °C) (Tympanic)   Resp 18   Ht 6' 3 6" (1 92 m)   Wt 118 kg (260 lb)   SpO2 98%   BMI 31 98 kg/m²          Physical Exam   Constitutional: He is oriented to person, place, and time  He appears well-developed and well-nourished  No distress  Eyes: Pupils are equal, round, and reactive to light  No scleral icterus  Cardiovascular: Normal rate, regular rhythm and normal heart sounds  No murmur heard  Pulmonary/Chest: Effort normal and breath sounds normal  No respiratory distress  He has no wheezes  Abdominal: Soft  Bowel sounds are normal  He exhibits no distension  There is no tenderness  Neurological: He is alert and oriented to person, place, and time  Skin: Skin is warm and dry  Rash noted  He is not diaphoretic  Erythematous widespread rash with associated pruritis  Psychiatric: He has a normal mood and affect

## 2019-05-01 ENCOUNTER — OFFICE VISIT (OUTPATIENT)
Dept: DERMATOLOGY | Facility: CLINIC | Age: 54
End: 2019-05-01
Payer: COMMERCIAL

## 2019-05-01 VITALS — BODY MASS INDEX: 31.66 KG/M2 | WEIGHT: 260 LBS | TEMPERATURE: 97.9 F | HEIGHT: 76 IN

## 2019-05-01 DIAGNOSIS — L20.89 OTHER ATOPIC DERMATITIS: Primary | ICD-10-CM

## 2019-05-01 PROCEDURE — 99213 OFFICE O/P EST LOW 20 MIN: CPT | Performed by: DERMATOLOGY

## 2019-05-01 RX ORDER — CLOBETASOL PROPIONATE 0.5 MG/G
OINTMENT TOPICAL
Qty: 60 G | Refills: 2 | Status: SHIPPED | OUTPATIENT
Start: 2019-05-01 | End: 2020-09-23

## 2019-08-13 DIAGNOSIS — F41.9 ANXIETY: ICD-10-CM

## 2019-08-14 RX ORDER — FLUOXETINE HYDROCHLORIDE 40 MG/1
CAPSULE ORAL
Qty: 30 CAPSULE | Refills: 1 | Status: SHIPPED | OUTPATIENT
Start: 2019-08-14 | End: 2019-09-18

## 2019-09-18 ENCOUNTER — OFFICE VISIT (OUTPATIENT)
Dept: FAMILY MEDICINE CLINIC | Facility: CLINIC | Age: 54
End: 2019-09-18
Payer: COMMERCIAL

## 2019-09-18 VITALS
WEIGHT: 275.2 LBS | HEART RATE: 73 BPM | DIASTOLIC BLOOD PRESSURE: 68 MMHG | SYSTOLIC BLOOD PRESSURE: 120 MMHG | BODY MASS INDEX: 33.51 KG/M2 | TEMPERATURE: 99.2 F | OXYGEN SATURATION: 97 % | RESPIRATION RATE: 18 BRPM | HEIGHT: 76 IN

## 2019-09-18 DIAGNOSIS — E78.2 MIXED HYPERLIPIDEMIA: ICD-10-CM

## 2019-09-18 DIAGNOSIS — L30.9 ECZEMA, UNSPECIFIED TYPE: ICD-10-CM

## 2019-09-18 DIAGNOSIS — F41.9 ANXIETY: ICD-10-CM

## 2019-09-18 PROCEDURE — 99214 OFFICE O/P EST MOD 30 MIN: CPT | Performed by: FAMILY MEDICINE

## 2019-09-18 PROCEDURE — 3008F BODY MASS INDEX DOCD: CPT | Performed by: FAMILY MEDICINE

## 2019-09-18 RX ORDER — FLUOXETINE HYDROCHLORIDE 40 MG/1
40 CAPSULE ORAL DAILY
Qty: 90 CAPSULE | Refills: 3 | Status: SHIPPED | OUTPATIENT
Start: 2019-09-18 | End: 2020-09-25 | Stop reason: SDUPTHER

## 2019-09-18 RX ORDER — HYDROXYZINE HYDROCHLORIDE 25 MG/1
25 TABLET, FILM COATED ORAL
Qty: 90 TABLET | Refills: 3 | Status: SHIPPED | OUTPATIENT
Start: 2019-09-18 | End: 2020-09-25 | Stop reason: SDUPTHER

## 2019-09-18 RX ORDER — ROSUVASTATIN CALCIUM 5 MG/1
5 TABLET, COATED ORAL DAILY
Qty: 90 TABLET | Refills: 3 | Status: SHIPPED | OUTPATIENT
Start: 2019-09-18 | End: 2020-09-25 | Stop reason: SDUPTHER

## 2019-09-18 NOTE — PROGRESS NOTES
BMI Counseling: Body mass index is 33 85 kg/m²  The BMI is above normal  Nutrition recommendations include reducing portion sizes and decreasing overall calorie intake  Exercise recommendations include moderate aerobic physical activity for 150 minutes/week

## 2019-09-18 NOTE — PROGRESS NOTES
Assessment/Plan:    No problem-specific Assessment & Plan notes found for this encounter  Diagnoses and all orders for this visit:    Anxiety  -     FLUoxetine (PROzac) 40 MG capsule; Take 1 capsule (40 mg total) by mouth daily    Eczema, unspecified type  -     hydrOXYzine HCL (ATARAX) 25 mg tablet; Take 1 tablet (25 mg total) by mouth daily at bedtime    Mixed hyperlipidemia  -     rosuvastatin (CRESTOR) 5 mg tablet; Take 1 tablet (5 mg total) by mouth daily      Follow up in 6 months    Subjective:      Patient ID: Romi Dean is a 52 y o  male  Patient is here to follow-up for chronic medical problems he has a history of anxiety currently controlled with Prozac  He denies any side effects from his medication  Also a history of hyperlipidemia currently on medications denies any side effects from it  Also history of eczema currently controlled with hydroxyzien at night on the weekends  The following portions of the patient's history were reviewed and updated as appropriate:   He  has a past medical history of Anxiety, Eczema, and Hypercholesterolemia  He   Patient Active Problem List    Diagnosis Date Noted    Eczema 02/27/2019    Anxiety 02/27/2019    Mixed hyperlipidemia 02/27/2019    Screening for diabetes mellitus 02/27/2019    Need for lipid screening 02/27/2019    Need for hepatitis C screening test 02/27/2019    Screening for prostate cancer 02/27/2019    Screening for colon cancer 02/27/2019    Impacted cerumen of left ear 02/27/2019     He  has no past surgical history on file  His family history includes Cancer in his father and mother; Coronary artery disease in his father and mother  He  reports that he has never smoked  He has never used smokeless tobacco  He reports that he drank alcohol  He reports that he does not use drugs    Current Outpatient Medications   Medication Sig Dispense Refill    clobetasol (TEMOVATE) 0 05 % ointment Apply twice a day Saturday and Sunday to trunk and extremities  Avoid face and groin area  When having a flare up apply twice up to 2 wks  60 g 2    Crisaborole (EUCRISA) 2 % OINT Apply twice a day Monday to Friday for 2 months straight to trunk and extremities,  Avoiding  face and groin area  Failed ultra poteen  topical steroids 1 Tube 3    FLUoxetine (PROzac) 40 MG capsule TAKE 1 CAPSULE DAILY 30 capsule 1    hydrOXYzine HCL (ATARAX) 25 mg tablet Take 1 tablet (25 mg total) by mouth daily at bedtime for 30 days 90 tablet 3    rosuvastatin (CRESTOR) 5 mg tablet Take 1 tablet (5 mg total) by mouth daily for 30 days 90 tablet 3    betamethasone, augmented, (DIPROLENE) 0 05 % ointment Apply topically 2 (two) times a day (Patient not taking: Reported on 9/18/2019) 30 g 0     No current facility-administered medications for this visit  Current Outpatient Medications on File Prior to Visit   Medication Sig    clobetasol (TEMOVATE) 0 05 % ointment Apply twice a day Saturday and Sunday to trunk and extremities  Avoid face and groin area  When having a flare up apply twice up to 2 wks   Crisaborole (EUCRISA) 2 % OINT Apply twice a day Monday to Friday for 2 months straight to trunk and extremities,  Avoiding  face and groin area  Failed ultra poteen  topical steroids    FLUoxetine (PROzac) 40 MG capsule TAKE 1 CAPSULE DAILY    hydrOXYzine HCL (ATARAX) 25 mg tablet Take 1 tablet (25 mg total) by mouth daily at bedtime for 30 days    rosuvastatin (CRESTOR) 5 mg tablet Take 1 tablet (5 mg total) by mouth daily for 30 days    [DISCONTINUED] FLUoxetine (PROzac) 40 MG capsule Take 1 capsule (40 mg total) by mouth daily for 30 days    betamethasone, augmented, (DIPROLENE) 0 05 % ointment Apply topically 2 (two) times a day (Patient not taking: Reported on 9/18/2019)    [DISCONTINUED] clobetasol (TEMOVATE) 0 05 % cream Apply twice a day Saturday and Sundays for two weeks  to trunk and extremities,  avoid face and groin area   (Patient not taking: Reported on 9/18/2019)    [DISCONTINUED] Crisaborole (EUCRISA) 2 % OINT Apply topically to Monday to Friday to trunk and extremities  (Patient not taking: Reported on 9/18/2019)     No current facility-administered medications on file prior to visit  He is allergic to medical tape       Review of Systems   Constitutional: Negative for activity change, appetite change, fatigue and fever  HENT: Negative for congestion and ear discharge  Respiratory: Negative for cough and shortness of breath  Cardiovascular: Negative for chest pain and palpitations  Gastrointestinal: Negative for diarrhea and nausea  Musculoskeletal: Negative for arthralgias and back pain  Skin: Negative for color change and rash  Neurological: Negative for dizziness and headaches  Psychiatric/Behavioral: Negative for agitation and behavioral problems  Objective:      /68   Pulse 73   Temp 99 2 °F (37 3 °C)   Resp 18   Ht 6' 3 6" (1 92 m)   Wt 125 kg (275 lb 3 2 oz)   SpO2 97%   BMI 33 85 kg/m²          Physical Exam   Constitutional: He is oriented to person, place, and time  He appears well-developed and well-nourished  No distress  Eyes: Pupils are equal, round, and reactive to light  No scleral icterus  Cardiovascular: Normal rate, regular rhythm and normal heart sounds  No murmur heard  Pulmonary/Chest: Effort normal and breath sounds normal  No respiratory distress  He has no wheezes  Abdominal: Soft  Bowel sounds are normal  He exhibits no distension  There is no tenderness  Neurological: He is alert and oriented to person, place, and time  Skin: Skin is warm and dry  No rash noted  He is not diaphoretic  Psychiatric: He has a normal mood and affect

## 2019-09-18 NOTE — LETTER
To whom it may concern:      Mr Rina Melendez takes hydroxyzine for anxiety at night time and during weekends  This medication has the side effect of sedation make it unsafe for him to drive or operate a motor vehicle  I recommend for him not to drive when taking this medication  If you have any questions feel free to contact us at [phone]          Sincerely,          Chaz Fuentes MD [facility] (Less than 3yr old or unable to communicate)/pain scale explained to mom

## 2020-01-22 DIAGNOSIS — L20.89 OTHER ATOPIC DERMATITIS: ICD-10-CM

## 2020-01-22 NOTE — TELEPHONE ENCOUNTER
Spoke with patients wife and states  needs a prior authorization for Eucrica 2% ointment  We will call back once prior authorization is done

## 2020-01-30 NOTE — TELEPHONE ENCOUNTER
Prior authorization received by fax with approval  Spoke with patient advised it was approved and [facility]

## 2020-09-10 ENCOUNTER — ANESTHESIA EVENT (OUTPATIENT)
Dept: PERIOP | Facility: AMBULARY SURGERY CENTER | Age: 55
End: 2020-09-10
Payer: COMMERCIAL

## 2020-09-15 DIAGNOSIS — F41.9 ANXIETY: ICD-10-CM

## 2020-09-16 RX ORDER — FLUOXETINE HYDROCHLORIDE 40 MG/1
CAPSULE ORAL
Qty: 90 CAPSULE | Refills: 3 | OUTPATIENT
Start: 2020-09-16

## 2020-09-21 ENCOUNTER — APPOINTMENT (OUTPATIENT)
Dept: LAB | Facility: CLINIC | Age: 55
End: 2020-09-21
Payer: COMMERCIAL

## 2020-09-21 DIAGNOSIS — G47.30 SEVERE SLEEP APNEA: ICD-10-CM

## 2020-09-21 LAB
ANION GAP SERPL CALCULATED.3IONS-SCNC: 4 MMOL/L (ref 4–13)
BUN SERPL-MCNC: 20 MG/DL (ref 5–25)
CALCIUM SERPL-MCNC: 9.4 MG/DL (ref 8.3–10.1)
CHLORIDE SERPL-SCNC: 111 MMOL/L (ref 100–108)
CO2 SERPL-SCNC: 26 MMOL/L (ref 21–32)
CREAT SERPL-MCNC: 0.83 MG/DL (ref 0.6–1.3)
ERYTHROCYTE [DISTWIDTH] IN BLOOD BY AUTOMATED COUNT: 13.9 % (ref 11.6–15.1)
GFR SERPL CREATININE-BSD FRML MDRD: 98 ML/MIN/1.73SQ M
GLUCOSE P FAST SERPL-MCNC: 86 MG/DL (ref 65–99)
HCT VFR BLD AUTO: 43.1 % (ref 36.5–49.3)
HGB BLD-MCNC: 14.3 G/DL (ref 12–17)
MCH RBC QN AUTO: 29.1 PG (ref 26.8–34.3)
MCHC RBC AUTO-ENTMCNC: 33.2 G/DL (ref 31.4–37.4)
MCV RBC AUTO: 88 FL (ref 82–98)
PLATELET # BLD AUTO: 176 THOUSANDS/UL (ref 149–390)
PMV BLD AUTO: 11.2 FL (ref 8.9–12.7)
POTASSIUM SERPL-SCNC: 4.2 MMOL/L (ref 3.5–5.3)
RBC # BLD AUTO: 4.91 MILLION/UL (ref 3.88–5.62)
SODIUM SERPL-SCNC: 141 MMOL/L (ref 136–145)
WBC # BLD AUTO: 4.95 THOUSAND/UL (ref 4.31–10.16)

## 2020-09-21 PROCEDURE — 80048 BASIC METABOLIC PNL TOTAL CA: CPT

## 2020-09-21 PROCEDURE — 85027 COMPLETE CBC AUTOMATED: CPT

## 2020-09-21 PROCEDURE — 36415 COLL VENOUS BLD VENIPUNCTURE: CPT

## 2020-09-23 PROBLEM — G47.30 SLEEP APNEA: Status: ACTIVE | Noted: 2020-09-23

## 2020-09-23 RX ORDER — DIPHENOXYLATE HYDROCHLORIDE AND ATROPINE SULFATE 2.5; .025 MG/1; MG/1
1 TABLET ORAL DAILY
COMMUNITY

## 2020-09-23 NOTE — PRE-PROCEDURE INSTRUCTIONS
Pre-Surgery Instructions:   Medication Instructions    Crisaborole (EUCRISA) 2 % OINT Instructed patient per Anesthesia Guidelines   FLUoxetine (PROzac) 40 MG capsule Instructed patient per Anesthesia Guidelines   hydrOXYzine HCL (ATARAX) 25 mg tablet Instructed patient per Anesthesia Guidelines   multivitamin (THERAGRAN) TABS Instructed patient per Anesthesia Guidelines   rosuvastatin (CRESTOR) 5 mg tablet Instructed patient per Anesthesia Guidelines  Preop,medications and showering instructions using DOVE Soap - patient can not use other soap because of  Skin condition reviewed

## 2020-09-24 ENCOUNTER — ANESTHESIA (OUTPATIENT)
Dept: PERIOP | Facility: AMBULARY SURGERY CENTER | Age: 55
End: 2020-09-24
Payer: COMMERCIAL

## 2020-09-24 ENCOUNTER — HOSPITAL ENCOUNTER (OUTPATIENT)
Facility: AMBULARY SURGERY CENTER | Age: 55
Setting detail: OUTPATIENT SURGERY
Discharge: HOME | End: 2020-09-24
Attending: OTOLARYNGOLOGY | Admitting: OTOLARYNGOLOGY
Payer: COMMERCIAL

## 2020-09-24 VITALS
OXYGEN SATURATION: 100 % | TEMPERATURE: 97 F | RESPIRATION RATE: 16 BRPM | WEIGHT: 251.6 LBS | SYSTOLIC BLOOD PRESSURE: 124 MMHG | BODY MASS INDEX: 31.28 KG/M2 | HEIGHT: 75 IN | HEART RATE: 60 BPM | DIASTOLIC BLOOD PRESSURE: 66 MMHG

## 2020-09-24 VITALS — HEART RATE: 71 BPM

## 2020-09-24 PROBLEM — Z12.5 SCREENING FOR PROSTATE CANCER: Status: RESOLVED | Noted: 2019-02-27 | Resolved: 2020-09-24

## 2020-09-24 PROBLEM — Z13.1 SCREENING FOR DIABETES MELLITUS: Status: RESOLVED | Noted: 2019-02-27 | Resolved: 2020-09-24

## 2020-09-24 PROBLEM — Z13.220 NEED FOR LIPID SCREENING: Status: RESOLVED | Noted: 2019-02-27 | Resolved: 2020-09-24

## 2020-09-24 PROBLEM — H61.22 IMPACTED CERUMEN OF LEFT EAR: Status: RESOLVED | Noted: 2019-02-27 | Resolved: 2020-09-24

## 2020-09-24 PROBLEM — Z12.11 SCREENING FOR COLON CANCER: Status: RESOLVED | Noted: 2019-02-27 | Resolved: 2020-09-24

## 2020-09-24 PROBLEM — Z11.59 NEED FOR HEPATITIS C SCREENING TEST: Status: RESOLVED | Noted: 2019-02-27 | Resolved: 2020-09-24

## 2020-09-24 PROCEDURE — 31575 DIAGNOSTIC LARYNGOSCOPY: CPT | Performed by: OTOLARYNGOLOGY

## 2020-09-24 RX ORDER — PROPOFOL 10 MG/ML
INJECTION, EMULSION INTRAVENOUS CONTINUOUS PRN
Status: DISCONTINUED | OUTPATIENT
Start: 2020-09-24 | End: 2020-09-24

## 2020-09-24 RX ORDER — LIDOCAINE HYDROCHLORIDE 10 MG/ML
0.5 INJECTION, SOLUTION EPIDURAL; INFILTRATION; INTRACAUDAL; PERINEURAL ONCE AS NEEDED
Status: DISCONTINUED | OUTPATIENT
Start: 2020-09-24 | End: 2020-09-24 | Stop reason: HOSPADM

## 2020-09-24 RX ORDER — SODIUM CHLORIDE, SODIUM LACTATE, POTASSIUM CHLORIDE, CALCIUM CHLORIDE 600; 310; 30; 20 MG/100ML; MG/100ML; MG/100ML; MG/100ML
125 INJECTION, SOLUTION INTRAVENOUS CONTINUOUS
Status: DISCONTINUED | OUTPATIENT
Start: 2020-09-24 | End: 2020-09-24 | Stop reason: HOSPADM

## 2020-09-24 RX ORDER — OXYMETAZOLINE HYDROCHLORIDE 0.05 G/100ML
2 SPRAY NASAL
Status: ACTIVE | OUTPATIENT
Start: 2020-09-24 | End: 2020-09-24

## 2020-09-24 RX ORDER — PROPOFOL 10 MG/ML
INJECTION, EMULSION INTRAVENOUS AS NEEDED
Status: DISCONTINUED | OUTPATIENT
Start: 2020-09-24 | End: 2020-09-24

## 2020-09-24 RX ORDER — ATROPINE SULFATE 0.4 MG/ML
AMPUL (ML) INJECTION AS NEEDED
Status: DISCONTINUED | OUTPATIENT
Start: 2020-09-24 | End: 2020-09-24

## 2020-09-24 RX ORDER — GLYCOPYRROLATE 0.2 MG/ML
INJECTION INTRAMUSCULAR; INTRAVENOUS AS NEEDED
Status: DISCONTINUED | OUTPATIENT
Start: 2020-09-24 | End: 2020-09-24

## 2020-09-24 RX ADMIN — GLYCOPYRROLATE 0.2 MG: 0.2 INJECTION, SOLUTION INTRAMUSCULAR; INTRAVENOUS at 08:46

## 2020-09-24 RX ADMIN — SODIUM CHLORIDE, SODIUM LACTATE, POTASSIUM CHLORIDE, AND CALCIUM CHLORIDE: .6; .31; .03; .02 INJECTION, SOLUTION INTRAVENOUS at 09:06

## 2020-09-24 RX ADMIN — PROPOFOL 10 MG: 10 INJECTION, EMULSION INTRAVENOUS at 09:07

## 2020-09-24 RX ADMIN — Medication 0.2 MG: at 08:47

## 2020-09-24 RX ADMIN — PROPOFOL 100 MCG/KG/MIN: 10 INJECTION, EMULSION INTRAVENOUS at 09:07

## 2020-09-24 NOTE — ANESTHESIA POSTPROCEDURE EVALUATION
Post-Op Assessment Note    CV Status:  Stable  Pain Score: 0    Pain management: adequate     Mental Status:  Alert and awake   Hydration Status:  Euvolemic   PONV Controlled:  Controlled   Airway Patency:  Patent      Post Op Vitals Reviewed: Yes      Staff: Anesthesiologist, CRNA         No complications documented      BP   130/74   Temp (!) 97 4 °F (36 3 °C) (09/24/20 0922)    Pulse 61 (09/24/20 0922)   Resp 16 (09/24/20 0922)    SpO2 98 % (09/24/20 0922)

## 2020-09-24 NOTE — OP NOTE
OPERATIVE REPORT  PATIENT NAME: Tim Marsh    :  1965  MRN: [id number]  Pt Location: [facility] 05    SURGERY DATE: 2020    Surgeon(s) and Role:     * Jluisa Galloway MD - Primary    Preop Diagnosis:  Severe sleep apnea [G47 30]    Post-Op Diagnosis Codes: * Severe sleep apnea [G47 30]    Procedure(s) (LRB):  DRUG INDUCED SLEEP ENDOSCOPY (N/A)    Specimen(s):  * No specimens in log *    Estimated Blood Loss:   Minimal    Drains:  * No LDAs found *    Anesthesia Type:   General    Operative Indications:  Severe sleep apnea [G47 30]    Operative Findings:  V 3 complete AP  O 3 complete AP  T 3 complete AP  E 3 complete AP    Complications:   None    Procedure and Technique:    PREOPERATIVE DIAGNOSES: Obstructive sleep apnea with positive pressure   intolerance and persistent sleep apnea after CPAP trial    POSTOPERATIVE DIAGNOSES: Same    PROCEDURES: Drug-induced sleep endoscopy (flexible fiberoptic laryngoscopy   with examination under anesthesia)  ANESTHESIA: IV sedation  ESTIMATED BLOOD LOSS: None  COMPLICATIONS: None  INDICATIONS: Tim Marsh is a 62y o  year-old male with a history of symptomatic obstructive sleep apnea, who is intolerant and unable to achieve benefit with positive pressure therapy  He presents today for drug-induced sleep endoscopy to better characterize her locations and pattern of obstruction and to predict appropriate medical and/or surgical options moving forward  FINDINGS: There was no evidence of complete concentric palatal obstruction and he does appear to be a candidate anatomically for hypoglossal nerve   stimulation therapy  DESCRIPTION OF PROCEDURE: Tim Marsh was brought to the operating room and was anesthetized via the standard drug-induced sleep endoscopy protocol  The propofol infusion rate was startedand gradually increased until conditions that mimic sleep were gradually observed       With the patient not responsive to verbal commands, but still with spontaneous respiration, sleep disordered breathing events and associated desaturations were clearly observed    Under these conditions, the flexible endoscope was inserted to examine both sides of the nose as well as the pharynx and larynx  The VOTE score at baseline was V: 3 complete AP; O: 3 complete AP; T: 3 complete AP; E: 3 complete AP  With simulated jaw advancement and tongue advancement, the hypopharyngeal obstruction and secondarily the palatal collapse also improved  In summary, there was no evidence of complete concentric palatal obstruction and he does appear to be a candidate anatomically for hypoglossal nerve stimulation therapy  The patient was then allowed to awaken while monitoring by anesthesia was performed until he was awake and able to maintain his own saturations  The patient was taken to the recovery area in stable condition  All instruments and sponge counts were correct at the end of the procedure  Emily Mendez MD, was present for and performed all key elements of the procedure           I was present for the entire procedure and A qualified resident physician was not available    Patient Disposition:  PACU  and hemodynamically stable    SIGNATURE: Yordy Flores MD  DATE: September 24, 2020  TIME: 9:34 AM

## 2020-09-24 NOTE — DISCHARGE INSTRUCTIONS
Drug Induced Sleep Endoscopy     WHAT YOU SHOULD KNOW:   During a drug induced sleep endsocopy (DISE), your caregiver places a scope into your nose to see inside your nose and throat  You may need a DISE to find the cause of your sleep apnea  DISE helps your caregiver diagnose your condition and create a treatment plan  You might also have surgery or other treatments during a DISE  AFTER YOU LEAVE:     Follow up with your primary healthcare provider or specialist as directed:  Write down your questions so you remember to ask them during your visits  Medicines:   · Keep a current list of your medicines:  Include the amounts, and when, how, and why you take them  Take the list or the pill bottles to follow-up visits  Carry your medicine list with you in case of an emergency  Throw away old medicine lists  Use vitamins, herbs, or food supplements only as directed  · Take your medicine as directed:  Call your healthcare provider if you think your medicine is not working as expected  Tell him about any medicine allergies, and if you want to quit taking or change your medicine  Nutrition:  Most people eat and drink as usual after a laryngoscopy  Ask your primary healthcare provider if you are not sure  Contact your primary healthcare provider if:  · You have problems breathing or talking  · You see new injuries to your teeth, lips, or tongue  · Your pain does not go away or gets worse  · You have questions about your procedure, medicine, or care  If you have any questions or concerns during business hours please call our office at [phone]   After hours please call the surgeon on call or Dr Rockwell Sensor at [phone]

## 2020-09-24 NOTE — H&P
Consultation - Otolaryngology - Head and Neck Surgery  Facial Plastic and Reconstructive Surgery  Kevin Waite 62 y o  male MRN: [id number]  Encounter: [id number]        Assessment/Plan: Unchanged from previous    Obstructive sleep apnea: We discussed the nature of obstructive sleep apnea  We discussed the natural history of sleep apnea  We discussed options for management  We discussed non-surgical management including weight loss, mandibular advancement devices, and positive airway pressure therapy including various options  We discussed that he is not tolerating his CPAP and has at least moderate CORI with an AHI of 34 24 and BMI of 30, he would like to move forward with Drug Induced Sleep Endoscopy to evaluate the source of the obstructions  We will plan for DISE and if necessary repeat sleep study if one has not been performed within 3 years  If surgical treatable causes of sleep apnea are seen such as tongue base laxity, we will consider options for surgical treatment  After the procedure we will further discuss surgical and non-surgical options, if necessary, at that time  Will also plan for new sleep study as his previous is nearly 3years old  Set for tomorrow  History of Present Illness   Physician Requesting Consult: Matthew Maynard MD  Reason for Consult / Principal Problem: CORI  HPI: Kevin Waite is a 62y o  year old male who presents with History of severe obstructive sleep apnea  He has trialed CPAP therapy with multiple hose options  He unknowingly removes the CPAP in the middle of the night  Occasionally he can sleep through the night with the CPAP on  He does not wish any pharyngeal surgery  He underwent a sleep study in August 2016 which demonstrated an AHI of 34 24 he had an oxygen paul of 79% and 29 84% of the night was under 90%  He has AM headaches and severe difficulty staying asleep  He wakes tired and does not feel rested throughout the day  No repeat sleep study  Review of systems:  10 Point ROS was performed and negative except as above or otherwise noted in the medical record  Historical Information   Past Medical History:   Diagnosis Date    Anxiety     Eczema     Gastric ulcer     Hypercholesterolemia     Sleep apnea      Past Surgical History:   Procedure Laterality Date    CYST REMOVAL      EGD      LASIK       Social History   Social History     Substance and Sexual Activity   Alcohol Use Yes    Frequency: Monthly or less    Drinks per session: 1 or 2    Binge frequency: Never    Comment: socially     Social History     Substance and Sexual Activity   Drug Use Never     Social History     Tobacco Use   Smoking Status Former Smoker    Last attempt to quit: 2010    Years since quitting: 10 7   Smokeless Tobacco Never Used     Family History:   Family History   Problem Relation Age of Onset    Cancer Mother         ovarian and stomach    Coronary artery disease Mother     Coronary artery disease Father     Cancer Father         pancratic       No current facility-administered medications on file prior to encounter        Current Outpatient Medications on File Prior to Encounter   Medication Sig    Crisaborole (EUCRISA) 2 % OINT Apply topically twice a day Monday to Friday to trunk and extremities    FLUoxetine (PROzac) 40 MG capsule Take 1 capsule (40 mg total) by mouth daily (Patient taking differently: Take 40 mg by mouth daily at bedtime )    hydrOXYzine HCL (ATARAX) 25 mg tablet Take 1 tablet (25 mg total) by mouth daily at bedtime    multivitamin (THERAGRAN) TABS Take 1 tablet by mouth daily    rosuvastatin (CRESTOR) 5 mg tablet Take 1 tablet (5 mg total) by mouth daily (Patient taking differently: Take 5 mg by mouth daily at bedtime )       Allergies   Allergen Reactions    Shellfish-Derived Products Anaphylaxis, Dizziness and Lightheadedness    Medical Tape Itching and Rash       Vitals:    09/24/20 0805   BP: 125/78   Pulse: 62 Resp: 18   Temp: (!) 97 °F (36 1 °C)   SpO2: 97%       Physical Exam   Constitutional: Oriented to person, place, and time  Well-developed and well-nourished, no apparent distress, non-toxic appearance  Cooperative, able to hear and answer questions without difficulty  Voice: Normal voice quality  Head: Normocephalic, atraumatic  No scars, masses or lesions  Face: Symmetric, no edema, no sinus tenderness  Eyes: Vision grossly intact, extra-ocular movement intact  Ears: External ears normal  Bilateral cerumen impaction  Tympanic membranes intact with intact normal landmarks  No post-auricular erythema or tenderness  Nose: Septum intact, nares clear  Mucosa moist, turbinates well appearing  No crusting, polyps or discharge evident  Oral cavity: Dentition intact  Mucosa moist, lips without lesions or masses  Tongue mobile, floor of mouth soft and flat  Hard palate intact  No masses or lesions  Oropharynx: Uvula is midline, soft palate intact without lesion or mass  Oropharyngeal inlet without obstruction  Tonsils unremarkable  Posterior pharyngeal wall clear  No masses or lesions  Salivary glands:  Parotid glands and submandibular glands symmetric, no enlargement or tenderness  Neck: Normal laryngeal elevation with swallow  Trachea midline  No masses or lesions  No palpable adenopathy  Thyroid: Without tenderness or palpable nodules  Pulmonary/Chest: Normal effort and rate  No respiratory distress  No stertor or stridor  Musculoskeletal: Normal range of motion  Neurological: Cranial nerves 2-12 intact  Skin: Skin is warm and dry  Psychiatric: Normal mood and affect  CTAB  RRR    Imaging Studies: I have personally reviewed pertinent reports  Lab Results: I have personally reviewed pertinent lab results  Greater than 40 minutes were spent in consultation  More than 1/2 of that time was spent in counselling and coordination of care

## 2020-09-24 NOTE — ANESTHESIA PREPROCEDURE EVALUATION
Procedure:  DRUG INDUCED SLEEP ENDOSCOPY (N/A Throat)    Relevant Problems   CARDIO   (+) Mixed hyperlipidemia      NEURO/PSYCH   (+) Anxiety      PULMONARY   (+) Sleep apnea        Physical Exam    Airway    Mallampati score: II  TM Distance: >3 FB  Neck ROM: full     Dental   Comment: UPPER IMPLANTS, implants,     Cardiovascular      Pulmonary      Other Findings        Anesthesia Plan  ASA Score- 3     Anesthesia Type- IV sedation with anesthesia with ASA Monitors  Additional Monitors:   Airway Plan:           Plan Factors-Exercise tolerance (METS): >4 METS  Chart reviewed  Patient is not a current smoker  Patient not instructed to abstain from smoking on day of procedure  Patient did not smoke on day of surgery  Induction- intravenous  Postoperative Plan-     Informed Consent- Anesthetic plan and risks discussed with patient  I personally reviewed this patient with the CRNA  Discussed and agreed on the Anesthesia Plan with the CRNA           Lab Results   Component Value Date    GLUC 101 (H) 03/20/2019    GLUF 86 09/21/2020    ALT 16 03/20/2019    AST 16 03/20/2019    BUN 20 09/21/2020    CALCIUM 9 4 09/21/2020     (H) 09/21/2020    CO2 26 09/21/2020    CREATININE 0 83 09/21/2020    HDL 38 (L) 03/20/2019    HCT 43 1 09/21/2020    HGB 14 3 09/21/2020     09/21/2020    K 4 2 09/21/2020    PSA 1 1 03/20/2019    TRIG 109 03/20/2019    WBC 4 95 09/21/2020

## 2020-09-24 NOTE — PROGRESS NOTES
Mohan Hutchison 1965 male MRN: [id number]      ASSESSMENT/PLAN  Problem List Items Addressed This Visit        Respiratory    Sleep apnea       Musculoskeletal and Integument    Eczema    Relevant Medications    hydrOXYzine HCL (ATARAX) 25 mg tablet       Other    Anxiety    Relevant Medications    FLUoxetine (PROzac) 40 MG capsule    Mixed hyperlipidemia - Primary    Relevant Medications    rosuvastatin (CRESTOR) 5 mg tablet    Other Relevant Orders    Hepatic function panel    Lipid panel      Other Visit Diagnoses     Screening for malignant neoplasm of prostate        Relevant Orders    PSA, Total Screen        HLD: Update lipids + LFTs  Anxiety: Stable  Eczema: Managed with current regimen   CORI: F/u with ENT/Sleep Center as scheduled     HIV screening deferred per pt request  PSA Ordered  CRC Screening deferred per pt request     Refills sent  BMI Counseling: Body mass index is 32 02 kg/m²  The BMI is above normal  Nutrition recommendations include 3-5 servings of fruits/vegetables daily  Future Appointments   Date Time Provider [facility]   9/25/2020 12:00 PM BE HOME STUDY ROOM [facility] BE [facility]   10/6/2020  8:30 AM Hossein Pulliam MD [facility] ENT  SPA   10/6/2020  9:00 AM Hossein Pulliam MD [facility] E[facility]  SPA          SUBJECTIVE  CC: Anxiety (Patient seen in office today for a follow up on anxiety - no c/o per patient)      HPI:  Mohan Hutchison is a 62 y o  male who presents for follow up of chronic conditions as detailed below  HLD: Tolerating statin without issue   Anxiety: On Prozac  Eczema: Topical steroid + Hydroxyzine PRN   CORI: Intolerant to CPAP, S/p Drug-Induced Endoscopy with ENT 9/23  Is scheduled for Sleep Study     Pt needs refill on Crestor, Atarax, and Prozac  Review of Systems   Constitutional: Negative for unexpected weight change  HENT: Negative for congestion, ear pain, rhinorrhea and sore throat      Eyes: Negative for visual disturbance  Respiratory: Negative for shortness of breath  Cardiovascular: Negative for chest pain, palpitations and leg swelling  Gastrointestinal: Negative for abdominal pain, constipation and diarrhea  Endocrine: Negative for polyuria  Genitourinary: Negative for dysuria  Neurological: Negative for dizziness and light-headedness  Psychiatric/Behavioral: Positive for sleep disturbance         Historical Information   The patient history was reviewed and updated as follows:    Past Medical History:   Diagnosis Date    Anxiety     Eczema     Gastric ulcer     Hypercholesterolemia     Impacted cerumen of left ear 2/27/2019    Sleep apnea      Past Surgical History:   Procedure Laterality Date    CYST REMOVAL      EGD      LASIK       Family History   Problem Relation Age of Onset    Cancer Mother         ovarian and stomach    Coronary artery disease Mother     Coronary artery disease Father     Cancer Father         pancratic      Social History   Social History     Substance and Sexual Activity   Alcohol Use Yes    Frequency: Monthly or less    Drinks per session: 1 or 2    Binge frequency: Never    Comment: socially     Social History     Substance and Sexual Activity   Drug Use Never     Social History     Tobacco Use   Smoking Status Former Smoker    Last attempt to quit: 2010    Years since quitting: 10 7   Smokeless Tobacco Never Used       Medications:     Current Outpatient Medications:     FLUoxetine (PROzac) 40 MG capsule, Take 1 capsule (40 mg total) by mouth daily at bedtime, Disp: 90 capsule, Rfl: 3    hydrOXYzine HCL (ATARAX) 25 mg tablet, Take 1 tablet (25 mg total) by mouth daily at bedtime, Disp: 90 tablet, Rfl: 3    multivitamin (THERAGRAN) TABS, Take 1 tablet by mouth daily, Disp: , Rfl:     rosuvastatin (CRESTOR) 5 mg tablet, Take 1 tablet (5 mg total) by mouth daily at bedtime, Disp: 90 tablet, Rfl: 3    Crisaborole (EUCRISA) 2 % OINT, Apply topically twice a day Monday to Friday to trunk and extremities, Disp: 100 g, Rfl: 6  No current facility-administered medications for this visit  Allergies   Allergen Reactions    Shellfish-Derived Products Anaphylaxis, Dizziness and Lightheadedness    Medical Tape Itching and Rash       OBJECTIVE    Vitals:   Vitals:    09/25/20 0818   BP: 118/70   Pulse: 62   Temp: (!) 96 9 °F (36 1 °C)   SpO2: 99%   Weight: 116 kg (256 lb 3 2 oz)   Height: 6' 3" (1 905 m)           Physical Exam  Vitals signs and nursing note reviewed  Constitutional:       General: He is not in acute distress  Appearance: Normal appearance  HENT:      Head: Normocephalic and atraumatic  Right Ear: Tympanic membrane and ear canal normal       Left Ear: Tympanic membrane and ear canal normal       Nose: Nose normal       Mouth/Throat:      Mouth: Mucous membranes are moist       Pharynx: No oropharyngeal exudate or posterior oropharyngeal erythema  Eyes:      Conjunctiva/sclera: Conjunctivae normal    Cardiovascular:      Rate and Rhythm: Normal rate and regular rhythm  Pulmonary:      Effort: Pulmonary effort is normal  No respiratory distress  Breath sounds: Normal breath sounds  Abdominal:      General: Bowel sounds are normal  There is no distension  Palpations: Abdomen is soft  Tenderness: There is no abdominal tenderness  Musculoskeletal:      Right lower leg: No edema  Left lower leg: No edema  Lymphadenopathy:      Cervical: No cervical adenopathy  Skin:     General: Skin is warm and dry  Neurological:      General: No focal deficit present  Mental Status: He is alert     Psychiatric:         Mood and Affect: Mood normal                     DO Raoul Abdi [address] [facility]   9/25/2020  8:29 AM

## 2020-09-25 ENCOUNTER — APPOINTMENT (OUTPATIENT)
Dept: LAB | Facility: CLINIC | Age: 55
End: 2020-09-25
Payer: COMMERCIAL

## 2020-09-25 ENCOUNTER — OFFICE VISIT (OUTPATIENT)
Dept: FAMILY MEDICINE CLINIC | Facility: CLINIC | Age: 55
End: 2020-09-25
Payer: COMMERCIAL

## 2020-09-25 ENCOUNTER — HOSPITAL ENCOUNTER (OUTPATIENT)
Dept: SLEEP CENTER | Facility: CLINIC | Age: 55
Discharge: HOME | End: 2020-09-25
Payer: COMMERCIAL

## 2020-09-25 VITALS
SYSTOLIC BLOOD PRESSURE: 118 MMHG | TEMPERATURE: 96.9 F | WEIGHT: 256.2 LBS | OXYGEN SATURATION: 99 % | HEART RATE: 62 BPM | BODY MASS INDEX: 31.85 KG/M2 | DIASTOLIC BLOOD PRESSURE: 70 MMHG | HEIGHT: 75 IN

## 2020-09-25 DIAGNOSIS — F41.9 ANXIETY: ICD-10-CM

## 2020-09-25 DIAGNOSIS — Z12.5 SCREENING FOR MALIGNANT NEOPLASM OF PROSTATE: ICD-10-CM

## 2020-09-25 DIAGNOSIS — G47.33 SEVERE OBSTRUCTIVE SLEEP APNEA: ICD-10-CM

## 2020-09-25 DIAGNOSIS — E78.2 MIXED HYPERLIPIDEMIA: Primary | ICD-10-CM

## 2020-09-25 DIAGNOSIS — G47.30 SLEEP APNEA, UNSPECIFIED TYPE: ICD-10-CM

## 2020-09-25 DIAGNOSIS — E78.2 MIXED HYPERLIPIDEMIA: ICD-10-CM

## 2020-09-25 DIAGNOSIS — L30.9 ECZEMA, UNSPECIFIED TYPE: ICD-10-CM

## 2020-09-25 LAB
ALBUMIN SERPL BCP-MCNC: 3.8 G/DL (ref 3.5–5)
ALP SERPL-CCNC: 62 U/L (ref 46–116)
ALT SERPL W P-5'-P-CCNC: 23 U/L (ref 12–78)
AST SERPL W P-5'-P-CCNC: 12 U/L (ref 5–45)
BILIRUB DIRECT SERPL-MCNC: 0.21 MG/DL (ref 0–0.2)
BILIRUB SERPL-MCNC: 1.13 MG/DL (ref 0.2–1)
CHOLEST SERPL-MCNC: 119 MG/DL (ref 50–200)
HDLC SERPL-MCNC: 34 MG/DL
LDLC SERPL CALC-MCNC: 68 MG/DL (ref 0–100)
NONHDLC SERPL-MCNC: 85 MG/DL
PROT SERPL-MCNC: 7 G/DL (ref 6.4–8.2)
PSA SERPL-MCNC: 2.2 NG/ML (ref 0–4)
TRIGL SERPL-MCNC: 84 MG/DL

## 2020-09-25 PROCEDURE — G0103 PSA SCREENING: HCPCS

## 2020-09-25 PROCEDURE — G0399 HOME SLEEP TEST/TYPE 3 PORTA: HCPCS

## 2020-09-25 PROCEDURE — 1036F TOBACCO NON-USER: CPT | Performed by: FAMILY MEDICINE

## 2020-09-25 PROCEDURE — 36415 COLL VENOUS BLD VENIPUNCTURE: CPT

## 2020-09-25 PROCEDURE — 80076 HEPATIC FUNCTION PANEL: CPT

## 2020-09-25 PROCEDURE — 80061 LIPID PANEL: CPT

## 2020-09-25 PROCEDURE — 99214 OFFICE O/P EST MOD 30 MIN: CPT | Performed by: FAMILY MEDICINE

## 2020-09-25 PROCEDURE — G0399 HOME SLEEP TEST/TYPE 3 PORTA: HCPCS | Performed by: INTERNAL MEDICINE

## 2020-09-25 PROCEDURE — 3725F SCREEN DEPRESSION PERFORMED: CPT | Performed by: FAMILY MEDICINE

## 2020-09-25 RX ORDER — HYDROXYZINE HYDROCHLORIDE 25 MG/1
25 TABLET, FILM COATED ORAL
Qty: 90 TABLET | Refills: 3 | Status: SHIPPED | OUTPATIENT
Start: 2020-09-25 | End: 2021-05-14 | Stop reason: SDUPTHER

## 2020-09-25 RX ORDER — ROSUVASTATIN CALCIUM 5 MG/1
5 TABLET, COATED ORAL
Qty: 90 TABLET | Refills: 3 | Status: SHIPPED | OUTPATIENT
Start: 2020-09-25 | End: 2021-05-14 | Stop reason: SDUPTHER

## 2020-09-25 RX ORDER — FLUOXETINE HYDROCHLORIDE 40 MG/1
40 CAPSULE ORAL
Qty: 90 CAPSULE | Refills: 3 | Status: SHIPPED | OUTPATIENT
Start: 2020-09-25 | End: 2021-05-14 | Stop reason: SDUPTHER

## 2020-09-25 NOTE — PROGRESS NOTES
BMI Counseling: Body mass index is 32 02 kg/m²  The BMI is above normal  Nutrition recommendations include reducing portion sizes and 3-5 servings of fruits/vegetables daily  Exercise recommendations include exercising 3-5 times per week

## 2020-09-28 PROBLEM — G47.33 SEVERE OBSTRUCTIVE SLEEP APNEA: Status: ACTIVE | Noted: 2020-09-23

## 2020-09-28 NOTE — PROGRESS NOTES
Home Sleep Study Documentation    Pre-Sleep Home Study:    Set-up and instructions performed by: Kevin Anaya     Technician performed demonstration for Patient: yes    Return demonstration performed by Patient: yes    Written instructions provided to Patient: yes    Patient signed consent form: yes        Post-Sleep Home Study:    Additional comments by Patient: None    Home Sleep Study Failed:no:    Failure reason: N/A    Reported or Detected: N/A    Scored by: UGO Kelley

## 2021-05-14 ENCOUNTER — OFFICE VISIT (OUTPATIENT)
Dept: FAMILY MEDICINE CLINIC | Facility: CLINIC | Age: 56
End: 2021-05-14
Payer: COMMERCIAL

## 2021-05-14 VITALS
DIASTOLIC BLOOD PRESSURE: 72 MMHG | SYSTOLIC BLOOD PRESSURE: 116 MMHG | WEIGHT: 261 LBS | HEART RATE: 90 BPM | BODY MASS INDEX: 32.45 KG/M2 | OXYGEN SATURATION: 94 % | TEMPERATURE: 98.5 F | HEIGHT: 75 IN

## 2021-05-14 DIAGNOSIS — L20.89 OTHER ATOPIC DERMATITIS: ICD-10-CM

## 2021-05-14 DIAGNOSIS — F41.9 ANXIETY: ICD-10-CM

## 2021-05-14 DIAGNOSIS — Z12.5 SCREENING FOR MALIGNANT NEOPLASM OF PROSTATE: ICD-10-CM

## 2021-05-14 DIAGNOSIS — L30.9 ECZEMA, UNSPECIFIED TYPE: ICD-10-CM

## 2021-05-14 DIAGNOSIS — E78.2 MIXED HYPERLIPIDEMIA: ICD-10-CM

## 2021-05-14 DIAGNOSIS — Z13.1 SCREENING FOR DIABETES MELLITUS: ICD-10-CM

## 2021-05-14 DIAGNOSIS — Z00.00 HEALTH MAINTENANCE EXAMINATION: Primary | ICD-10-CM

## 2021-05-14 PROCEDURE — 3008F BODY MASS INDEX DOCD: CPT | Performed by: FAMILY MEDICINE

## 2021-05-14 PROCEDURE — 99396 PREV VISIT EST AGE 40-64: CPT | Performed by: FAMILY MEDICINE

## 2021-05-14 PROCEDURE — 1036F TOBACCO NON-USER: CPT | Performed by: FAMILY MEDICINE

## 2021-05-14 RX ORDER — FLUOXETINE HYDROCHLORIDE 40 MG/1
40 CAPSULE ORAL
Qty: 90 CAPSULE | Refills: 3 | Status: SHIPPED | OUTPATIENT
Start: 2021-05-14 | End: 2022-05-09

## 2021-05-14 RX ORDER — CRISABOROLE 20 MG/G
OINTMENT TOPICAL
Qty: 100 G | Refills: 6 | Status: SHIPPED | OUTPATIENT
Start: 2021-05-14 | End: 2022-07-05 | Stop reason: SDUPTHER

## 2021-05-14 RX ORDER — ROSUVASTATIN CALCIUM 5 MG/1
5 TABLET, COATED ORAL
Qty: 90 TABLET | Refills: 3 | Status: SHIPPED | OUTPATIENT
Start: 2021-05-14 | End: 2022-05-09

## 2021-05-14 RX ORDER — HYDROXYZINE HYDROCHLORIDE 25 MG/1
25 TABLET, FILM COATED ORAL
Qty: 90 TABLET | Refills: 3 | Status: SHIPPED | OUTPATIENT
Start: 2021-05-14 | End: 2022-07-05

## 2021-05-14 NOTE — PROGRESS NOTES
Assessment/Plan:    No problem-specific Assessment & Plan notes found for this encounter  Diagnoses and all orders for this visit:    Health maintenance examination  Normal exam    Other atopic dermatitis  -     Crisaborole (Eucrisa) 2 % OINT; Apply topically twice a day Monday to Friday to trunk and extremities    Mixed hyperlipidemia  -     rosuvastatin (CRESTOR) 5 mg tablet; Take 1 tablet (5 mg total) by mouth daily at bedtime    Eczema, unspecified type  -     hydrOXYzine HCL (ATARAX) 25 mg tablet; Take 1 tablet (25 mg total) by mouth daily at bedtime    Anxiety  -     FLUoxetine (PROzac) 40 MG capsule; Take 1 capsule (40 mg total) by mouth daily at bedtime      Follow up in 1 year or as needed    Subjective:      Patient ID: Damon Oliver is a 60 y o  male  Patient is here for annual physical   He has a history of anxiety and takes Prozac and hydroxyzine daily  No side effects  Not interested din colonoscopy  The following portions of the patient's history were reviewed and updated as appropriate:   He  has a past medical history of Anxiety, Eczema, Gastric ulcer, Hypercholesterolemia, Impacted cerumen of left ear (2/27/2019), and Sleep apnea  He   Patient Active Problem List    Diagnosis Date Noted    Health maintenance examination 05/14/2021    Severe obstructive sleep apnea 09/23/2020    Eczema 02/27/2019    Anxiety 02/27/2019    Mixed hyperlipidemia 02/27/2019     He  has a past surgical history that includes LASIK; Cyst Removal; EGD; and Examination under anesthesia (N/A, 9/24/2020)  His family history includes Cancer in his father and mother; Coronary artery disease in his father and mother  He  reports that he quit smoking about 11 years ago  He has never used smokeless tobacco  He reports current alcohol use  He reports that he does not use drugs    Current Outpatient Medications   Medication Sig Dispense Refill    Crisaborole (Eucrisa) 2 % OINT Apply topically twice a day Monday to Friday to trunk and extremities 100 g 6    FLUoxetine (PROzac) 40 MG capsule Take 1 capsule (40 mg total) by mouth daily at bedtime 90 capsule 3    hydrOXYzine HCL (ATARAX) 25 mg tablet Take 1 tablet (25 mg total) by mouth daily at bedtime 90 tablet 3    multivitamin (THERAGRAN) TABS Take 1 tablet by mouth daily      rosuvastatin (CRESTOR) 5 mg tablet Take 1 tablet (5 mg total) by mouth daily at bedtime 90 tablet 3     No current facility-administered medications for this visit  Current Outpatient Medications on File Prior to Visit   Medication Sig    multivitamin (THERAGRAN) TABS Take 1 tablet by mouth daily    [DISCONTINUED] Crisaborole (EUCRISA) 2 % OINT Apply topically twice a day Monday to Friday to trunk and extremities    [DISCONTINUED] FLUoxetine (PROzac) 40 MG capsule Take 1 capsule (40 mg total) by mouth daily at bedtime    [DISCONTINUED] hydrOXYzine HCL (ATARAX) 25 mg tablet Take 1 tablet (25 mg total) by mouth daily at bedtime    [DISCONTINUED] rosuvastatin (CRESTOR) 5 mg tablet Take 1 tablet (5 mg total) by mouth daily at bedtime     No current facility-administered medications on file prior to visit  He is allergic to shellfish-derived products - food allergy and medical tape       Review of Systems   Constitutional: Negative for activity change, appetite change, fatigue and fever  HENT: Negative for congestion and ear discharge  Respiratory: Negative for cough and shortness of breath  Cardiovascular: Negative for chest pain and palpitations  Gastrointestinal: Negative for diarrhea and nausea  Musculoskeletal: Negative for arthralgias and back pain  Skin: Negative for color change and rash  Neurological: Negative for dizziness and headaches  Psychiatric/Behavioral: Negative for agitation and behavioral problems           Objective:      /72   Pulse 90   Temp 98 5 °F (36 9 °C)   Ht 6' 3" (1 905 m)   Wt 118 kg (261 lb)   SpO2 94%   BMI 32 62 kg/m² Physical Exam  Constitutional:       General: He is not in acute distress  Appearance: He is well-developed  He is not diaphoretic  Eyes:      General: No scleral icterus  Pupils: Pupils are equal, round, and reactive to light  Cardiovascular:      Rate and Rhythm: Normal rate and regular rhythm  Heart sounds: Normal heart sounds  No murmur  Pulmonary:      Effort: Pulmonary effort is normal  No respiratory distress  Breath sounds: Normal breath sounds  No wheezing  Abdominal:      General: Bowel sounds are normal  There is no distension  Palpations: Abdomen is soft  Tenderness: There is no abdominal tenderness  Skin:     General: Skin is warm and dry  Findings: No rash  Neurological:      Mental Status: He is alert and oriented to person, place, and time

## 2021-10-12 ENCOUNTER — TELEPHONE (OUTPATIENT)
Dept: DERMATOLOGY | Facility: CLINIC | Age: 56
End: 2021-10-12

## 2021-12-14 ENCOUNTER — APPOINTMENT (EMERGENCY)
Dept: RADIOLOGY | Facility: HOSPITAL | Age: 56
End: 2021-12-14
Payer: COMMERCIAL

## 2021-12-14 ENCOUNTER — APPOINTMENT (EMERGENCY)
Dept: CT IMAGING | Facility: HOSPITAL | Age: 56
End: 2021-12-14
Payer: COMMERCIAL

## 2021-12-14 ENCOUNTER — HOSPITAL ENCOUNTER (EMERGENCY)
Facility: HOSPITAL | Age: 56
Discharge: HOME | End: 2021-12-14
Attending: EMERGENCY MEDICINE | Admitting: EMERGENCY MEDICINE
Payer: COMMERCIAL

## 2021-12-14 VITALS
SYSTOLIC BLOOD PRESSURE: 117 MMHG | TEMPERATURE: 98.3 F | RESPIRATION RATE: 20 BRPM | HEART RATE: 72 BPM | OXYGEN SATURATION: 99 % | DIASTOLIC BLOOD PRESSURE: 76 MMHG

## 2021-12-14 DIAGNOSIS — U07.1 COVID-19 VIRUS INFECTION: ICD-10-CM

## 2021-12-14 DIAGNOSIS — R06.02 SOB (SHORTNESS OF BREATH): Primary | ICD-10-CM

## 2021-12-14 LAB
ALBUMIN SERPL BCP-MCNC: 3.5 G/DL (ref 3.5–5)
ALP SERPL-CCNC: 55 U/L (ref 46–116)
ALT SERPL W P-5'-P-CCNC: 42 U/L (ref 12–78)
ANION GAP SERPL CALCULATED.3IONS-SCNC: 10 MMOL/L (ref 4–13)
APTT PPP: 30 SECONDS (ref 23–37)
AST SERPL W P-5'-P-CCNC: 27 U/L (ref 5–45)
ATRIAL RATE: 73 BPM
BASOPHILS # BLD AUTO: 0 THOUSANDS/ΜL (ref 0–0.1)
BASOPHILS NFR BLD AUTO: 0 % (ref 0–1)
BILIRUB DIRECT SERPL-MCNC: 0.15 MG/DL (ref 0–0.2)
BILIRUB SERPL-MCNC: 0.92 MG/DL (ref 0.2–1)
BUN SERPL-MCNC: 18 MG/DL (ref 5–25)
CALCIUM SERPL-MCNC: 8.6 MG/DL (ref 8.3–10.1)
CARDIAC TROPONIN I PNL SERPL HS: 5 NG/L
CHLORIDE SERPL-SCNC: 107 MMOL/L (ref 100–108)
CO2 SERPL-SCNC: 25 MMOL/L (ref 21–32)
CREAT SERPL-MCNC: 1.06 MG/DL (ref 0.6–1.3)
EOSINOPHIL # BLD AUTO: 0.02 THOUSAND/ΜL (ref 0–0.61)
EOSINOPHIL NFR BLD AUTO: 1 % (ref 0–6)
ERYTHROCYTE [DISTWIDTH] IN BLOOD BY AUTOMATED COUNT: 14.4 % (ref 11.6–15.1)
FLUAV RNA RESP QL NAA+PROBE: NEGATIVE
FLUBV RNA RESP QL NAA+PROBE: NEGATIVE
GFR SERPL CREATININE-BSD FRML MDRD: 78 ML/MIN/1.73SQ M
GLUCOSE SERPL-MCNC: 108 MG/DL (ref 65–140)
HCT VFR BLD AUTO: 43.3 % (ref 36.5–49.3)
HGB BLD-MCNC: 14.1 G/DL (ref 12–17)
IMM GRANULOCYTES # BLD AUTO: 0.01 THOUSAND/UL (ref 0–0.2)
IMM GRANULOCYTES NFR BLD AUTO: 0 % (ref 0–2)
INR PPP: 0.94 (ref 0.84–1.19)
LYMPHOCYTES # BLD AUTO: 0.94 THOUSANDS/ΜL (ref 0.6–4.47)
LYMPHOCYTES NFR BLD AUTO: 33 % (ref 14–44)
MCH RBC QN AUTO: 28.7 PG (ref 26.8–34.3)
MCHC RBC AUTO-ENTMCNC: 32.6 G/DL (ref 31.4–37.4)
MCV RBC AUTO: 88 FL (ref 82–98)
MONOCYTES # BLD AUTO: 0.27 THOUSAND/ΜL (ref 0.17–1.22)
MONOCYTES NFR BLD AUTO: 9 % (ref 4–12)
NEUTROPHILS # BLD AUTO: 1.62 THOUSANDS/ΜL (ref 1.85–7.62)
NEUTS SEG NFR BLD AUTO: 57 % (ref 43–75)
NRBC BLD AUTO-RTO: 0 /100 WBCS
NT-PROBNP SERPL-MCNC: 29 PG/ML
P AXIS: 74 DEGREES
PLATELET # BLD AUTO: 113 THOUSANDS/UL (ref 149–390)
PMV BLD AUTO: 11.3 FL (ref 8.9–12.7)
POTASSIUM SERPL-SCNC: 4.1 MMOL/L (ref 3.5–5.3)
PR INTERVAL: 170 MS
PROT SERPL-MCNC: 6.7 G/DL (ref 6.4–8.2)
PROTHROMBIN TIME: 12.2 SECONDS (ref 11.6–14.5)
QRS AXIS: 69 DEGREES
QRSD INTERVAL: 98 MS
QT INTERVAL: 386 MS
QTC INTERVAL: 425 MS
RBC # BLD AUTO: 4.92 MILLION/UL (ref 3.88–5.62)
RSV RNA RESP QL NAA+PROBE: NEGATIVE
SARS-COV-2 RNA RESP QL NAA+PROBE: POSITIVE
SODIUM SERPL-SCNC: 142 MMOL/L (ref 136–145)
T WAVE AXIS: 47 DEGREES
VENTRICULAR RATE: 73 BPM
WBC # BLD AUTO: 2.86 THOUSAND/UL (ref 4.31–10.16)

## 2021-12-14 PROCEDURE — 80076 HEPATIC FUNCTION PANEL: CPT | Performed by: PHYSICIAN ASSISTANT

## 2021-12-14 PROCEDURE — 0241U HB NFCT DS VIR RESP RNA 4 TRGT: CPT | Performed by: PHYSICIAN ASSISTANT

## 2021-12-14 PROCEDURE — 83880 ASSAY OF NATRIURETIC PEPTIDE: CPT | Performed by: PHYSICIAN ASSISTANT

## 2021-12-14 PROCEDURE — 84484 ASSAY OF TROPONIN QUANT: CPT | Performed by: PHYSICIAN ASSISTANT

## 2021-12-14 PROCEDURE — 99285 EMERGENCY DEPT VISIT HI MDM: CPT

## 2021-12-14 PROCEDURE — 71046 X-RAY EXAM CHEST 2 VIEWS: CPT

## 2021-12-14 PROCEDURE — 80048 BASIC METABOLIC PNL TOTAL CA: CPT | Performed by: PHYSICIAN ASSISTANT

## 2021-12-14 PROCEDURE — 99285 EMERGENCY DEPT VISIT HI MDM: CPT | Performed by: PHYSICIAN ASSISTANT

## 2021-12-14 PROCEDURE — 36415 COLL VENOUS BLD VENIPUNCTURE: CPT | Performed by: PHYSICIAN ASSISTANT

## 2021-12-14 PROCEDURE — 93005 ELECTROCARDIOGRAM TRACING: CPT

## 2021-12-14 PROCEDURE — 93010 ELECTROCARDIOGRAM REPORT: CPT | Performed by: INTERNAL MEDICINE

## 2021-12-14 PROCEDURE — 85730 THROMBOPLASTIN TIME PARTIAL: CPT | Performed by: PHYSICIAN ASSISTANT

## 2021-12-14 PROCEDURE — 85610 PROTHROMBIN TIME: CPT | Performed by: PHYSICIAN ASSISTANT

## 2021-12-14 PROCEDURE — 85025 COMPLETE CBC W/AUTO DIFF WBC: CPT | Performed by: PHYSICIAN ASSISTANT

## 2021-12-14 PROCEDURE — 70450 CT HEAD/BRAIN W/O DYE: CPT

## 2021-12-14 RX ORDER — ALBUTEROL SULFATE 90 UG/1
2 AEROSOL, METERED RESPIRATORY (INHALATION) EVERY 6 HOURS PRN
Qty: 6.7 G | Refills: 0 | Status: SHIPPED | OUTPATIENT
Start: 2021-12-14 | End: 2022-04-07

## 2021-12-15 ENCOUNTER — TELEPHONE (OUTPATIENT)
Dept: FAMILY MEDICINE CLINIC | Facility: CLINIC | Age: 56
End: 2021-12-15

## 2021-12-15 ENCOUNTER — TELEMEDICINE (OUTPATIENT)
Dept: FAMILY MEDICINE CLINIC | Facility: CLINIC | Age: 56
End: 2021-12-15
Payer: COMMERCIAL

## 2021-12-15 DIAGNOSIS — U07.1 COVID-19 VIRUS INFECTION: Primary | ICD-10-CM

## 2021-12-15 PROCEDURE — 99441 PR PHYS/QHP TELEPHONE EVALUATION 5-10 MIN: CPT | Performed by: FAMILY MEDICINE

## 2021-12-22 ENCOUNTER — TELEPHONE (OUTPATIENT)
Dept: FAMILY MEDICINE CLINIC | Facility: CLINIC | Age: 56
End: 2021-12-22

## 2021-12-23 ENCOUNTER — HOSPITAL ENCOUNTER (EMERGENCY)
Facility: HOSPITAL | Age: 56
Discharge: HOME | End: 2021-12-23
Attending: EMERGENCY MEDICINE | Admitting: EMERGENCY MEDICINE
Payer: COMMERCIAL

## 2021-12-23 ENCOUNTER — TELEMEDICINE (OUTPATIENT)
Dept: FAMILY MEDICINE CLINIC | Facility: CLINIC | Age: 56
End: 2021-12-23
Payer: COMMERCIAL

## 2021-12-23 VITALS
SYSTOLIC BLOOD PRESSURE: 116 MMHG | RESPIRATION RATE: 24 BRPM | DIASTOLIC BLOOD PRESSURE: 72 MMHG | HEART RATE: 78 BPM | OXYGEN SATURATION: 95 % | TEMPERATURE: 98.3 F

## 2021-12-23 DIAGNOSIS — U07.1 COVID-19 VIRUS INFECTION: Primary | ICD-10-CM

## 2021-12-23 DIAGNOSIS — R53.1 WEAKNESS: ICD-10-CM

## 2021-12-23 DIAGNOSIS — U07.1 COVID-19: Primary | ICD-10-CM

## 2021-12-23 DIAGNOSIS — E86.0 MILD DEHYDRATION: ICD-10-CM

## 2021-12-23 DIAGNOSIS — R55 RECURRENT SYNCOPE: ICD-10-CM

## 2021-12-23 DIAGNOSIS — U07.1 COVID-19: ICD-10-CM

## 2021-12-23 LAB
ALBUMIN SERPL BCP-MCNC: 3.7 G/DL (ref 3.5–5)
ALP SERPL-CCNC: 53 U/L (ref 34–104)
ALT SERPL W P-5'-P-CCNC: 40 U/L (ref 7–52)
ANION GAP SERPL CALCULATED.3IONS-SCNC: 8 MMOL/L (ref 4–13)
AST SERPL W P-5'-P-CCNC: 33 U/L (ref 13–39)
BASOPHILS # BLD AUTO: 0.01 THOUSANDS/ΜL (ref 0–0.1)
BASOPHILS NFR BLD AUTO: 0 % (ref 0–1)
BILIRUB SERPL-MCNC: 1.3 MG/DL (ref 0.2–1)
BUN SERPL-MCNC: 20 MG/DL (ref 5–25)
CALCIUM SERPL-MCNC: 9 MG/DL (ref 8.4–10.2)
CHLORIDE SERPL-SCNC: 104 MMOL/L (ref 96–108)
CO2 SERPL-SCNC: 25 MMOL/L (ref 21–32)
CREAT SERPL-MCNC: 0.76 MG/DL (ref 0.6–1.3)
EOSINOPHIL # BLD AUTO: 0.04 THOUSAND/ΜL (ref 0–0.61)
EOSINOPHIL NFR BLD AUTO: 1 % (ref 0–6)
ERYTHROCYTE [DISTWIDTH] IN BLOOD BY AUTOMATED COUNT: 13.9 % (ref 11.6–15.1)
GFR SERPL CREATININE-BSD FRML MDRD: 100 ML/MIN/1.73SQ M
GLUCOSE SERPL-MCNC: 114 MG/DL (ref 65–140)
HCT VFR BLD AUTO: 40.4 % (ref 36.5–49.3)
HGB BLD-MCNC: 13.5 G/DL (ref 12–17)
IMM GRANULOCYTES # BLD AUTO: 0.03 THOUSAND/UL (ref 0–0.2)
IMM GRANULOCYTES NFR BLD AUTO: 1 % (ref 0–2)
LYMPHOCYTES # BLD AUTO: 1.11 THOUSANDS/ΜL (ref 0.6–4.47)
LYMPHOCYTES NFR BLD AUTO: 21 % (ref 14–44)
MCH RBC QN AUTO: 28.6 PG (ref 26.8–34.3)
MCHC RBC AUTO-ENTMCNC: 33.4 G/DL (ref 31.4–37.4)
MCV RBC AUTO: 86 FL (ref 82–98)
MONOCYTES # BLD AUTO: 0.6 THOUSAND/ΜL (ref 0.17–1.22)
MONOCYTES NFR BLD AUTO: 11 % (ref 4–12)
NEUTROPHILS # BLD AUTO: 3.53 THOUSANDS/ΜL (ref 1.85–7.62)
NEUTS SEG NFR BLD AUTO: 66 % (ref 43–75)
NRBC BLD AUTO-RTO: 0 /100 WBCS
PLATELET # BLD AUTO: 184 THOUSANDS/UL (ref 149–390)
PMV BLD AUTO: 10.8 FL (ref 8.9–12.7)
POTASSIUM SERPL-SCNC: 3.5 MMOL/L (ref 3.5–5.3)
PROT SERPL-MCNC: 6.7 G/DL (ref 6.4–8.4)
RBC # BLD AUTO: 4.72 MILLION/UL (ref 3.88–5.62)
SODIUM SERPL-SCNC: 137 MMOL/L (ref 135–147)
WBC # BLD AUTO: 5.32 THOUSAND/UL (ref 4.31–10.16)

## 2021-12-23 PROCEDURE — 99284 EMERGENCY DEPT VISIT MOD MDM: CPT

## 2021-12-23 PROCEDURE — 85025 COMPLETE CBC W/AUTO DIFF WBC: CPT | Performed by: EMERGENCY MEDICINE

## 2021-12-23 PROCEDURE — 36415 COLL VENOUS BLD VENIPUNCTURE: CPT | Performed by: EMERGENCY MEDICINE

## 2021-12-23 PROCEDURE — 80053 COMPREHEN METABOLIC PANEL: CPT | Performed by: EMERGENCY MEDICINE

## 2021-12-23 PROCEDURE — 99441 PR PHYS/QHP TELEPHONE EVALUATION 5-10 MIN: CPT | Performed by: PHYSICIAN ASSISTANT

## 2021-12-23 PROCEDURE — 99284 EMERGENCY DEPT VISIT MOD MDM: CPT | Performed by: EMERGENCY MEDICINE

## 2021-12-23 RX ORDER — BUDESONIDE 180 UG/1
4 AEROSOL, POWDER RESPIRATORY (INHALATION) 2 TIMES DAILY
Qty: 1 EACH | Refills: 0 | Status: SHIPPED | OUTPATIENT
Start: 2021-12-23 | End: 2022-04-07 | Stop reason: ALTCHOICE

## 2021-12-23 RX ADMIN — SODIUM CHLORIDE 1000 ML: 0.9 INJECTION, SOLUTION INTRAVENOUS at 16:48

## 2022-01-03 ENCOUNTER — TELEMEDICINE (OUTPATIENT)
Dept: FAMILY MEDICINE CLINIC | Facility: CLINIC | Age: 57
End: 2022-01-03
Payer: COMMERCIAL

## 2022-01-03 DIAGNOSIS — U07.1 COVID-19 VIRUS INFECTION: Primary | ICD-10-CM

## 2022-01-03 PROCEDURE — 99441 PR PHYS/QHP TELEPHONE EVALUATION 5-10 MIN: CPT | Performed by: FAMILY MEDICINE

## 2022-01-03 NOTE — PROGRESS NOTES
COVID-19 Outpatient Progress Note    Assessment/Plan:    Problem List Items Addressed This Visit        Other    COVID-19 virus infection - Primary         Disposition:     I have spent 5 minutes directly with the patient  Greater than 50% of this time was spent in counseling/coordination of care regarding: prognosis, instructions for management and risk factor reductions  Encounter provider Reji Allan MD    Provider located at [place] [address]  [address] [place] [id number]    Recent Visits  No visits were found meeting these conditions  Showing recent visits within past 7 days and meeting all other requirements  Future Appointments  No visits were found meeting these conditions  Showing future appointments within next 150 days and meeting all other requirements     This virtual check-in was done via telephone and he agrees to proceed  Patient agrees to participate in a virtual check in via telephone or video visit instead of presenting to the office to address urgent/immediate medical needs  Patient is aware this is a billable service  After connecting through Telephone, the patient was identified by name and date of birth  Mala Nicole was informed that this was a telemedicine visit and that the exam was being conducted confidentially over secure lines  My office door was closed  No one else was in the room  Mala Nicole acknowledged consent and understanding of privacy and security of the telemedicine visit  I informed the patient that I have reviewed his record in Epic and presented the opportunity for him to ask any questions regarding the visit today  The patient agreed to participate  It was my intent to perform this visit via video technology but the patient was not able to do a video connection so the visit was completed via audio telephone only          Verification of patient location:  Patient is located in the following state in which I hold an active license: [place]    Subjective:   Em Wong is a 60 y o  male who has been screened for COVID-19  Symptom change since last report: improving  Patient is currently asymptomatic  Patient denies fever, chills, fatigue, malaise, congestion, rhinorrhea, sore throat, anosmia, loss of taste, cough, shortness of breath, chest tightness, abdominal pain, nausea, vomiting, diarrhea, myalgias and headaches  Date of positive COVID-19 PCR: 12/14/2021  COVID-19 vaccination status: Not vaccinated    Sarah Crowley has been staying home and has isolated themselves in his home  He is taking care to not share personal items and is cleaning all surfaces that are touched often, like counters, tabletops, and doorknobs using household cleaning sprays or wipes  He is wearing a mask when he leaves his room  Lab Results   Component Value Date    SARSCOV2 Positive (A) 12/14/2021     Past Medical History:   Diagnosis Date    Anxiety     Eczema     Gastric ulcer     Hypercholesterolemia     Impacted cerumen of left ear 2/27/2019    Sleep apnea      Past Surgical History:   Procedure Laterality Date    CYST REMOVAL      EGD      EXAMINATION UNDER ANESTHESIA N/A 9/24/2020    Procedure: DRUG INDUCED SLEEP ENDOSCOPY;  Surgeon: Nicholas Mata MD;  Location: [facility];  Service: ENT    LASIK       Current Outpatient Medications   Medication Sig Dispense Refill    albuterol (PROVENTIL HFA,VENTOLIN HFA) 90 mcg/act inhaler Inhale 2 puffs every 6 (six) hours as needed for wheezing or shortness of breath (Patient not taking: Reported on 12/23/2021 ) 6 7 g 0    budesonide (Pulmicort Flexhaler) 180 MCG/ACT inhaler Inhale 4 puffs 2 (two) times a day for 12 days Rinse mouth after use   1 each 0    Crisaborole (Eucrisa) 2 % OINT Apply topically twice a day Monday to Friday to trunk and extremities 100 g 6    FLUoxetine (PROzac) 40 MG capsule Take 1 capsule (40 mg total) by mouth daily at bedtime 90 capsule 3  hydrOXYzine HCL (ATARAX) 25 mg tablet Take 1 tablet (25 mg total) by mouth daily at bedtime 90 tablet 3    multivitamin (THERAGRAN) TABS Take 1 tablet by mouth daily      rosuvastatin (CRESTOR) 5 mg tablet Take 1 tablet (5 mg total) by mouth daily at bedtime 90 tablet 3     No current facility-administered medications for this visit  Allergies   Allergen Reactions    Shellfish-Derived Products - Food Allergy Anaphylaxis, Dizziness and Lightheadedness    Medical Tape Itching and Rash       Review of Systems   Constitutional: Negative for chills, fatigue and fever  HENT: Negative for congestion, rhinorrhea and sore throat  Respiratory: Negative for cough, chest tightness and shortness of breath  Gastrointestinal: Negative for abdominal pain, diarrhea, nausea and vomiting  Musculoskeletal: Negative for myalgias  Neurological: Negative for headaches  Objective: There were no vitals filed for this visit  Physical Exam  Neurological:      Mental Status: He is alert and oriented to person, place, and time  VIRTUAL VISIT DISCLAIMER    Adriel eVlasquez verbally agrees to participate in [employer]  Pt is aware that Virtual Care Services could be limited without vital signs or the ability to perform a full hands-on physical Linda Farideh Stuart understands he or the provider may request at any time to terminate the video visit and request the patient to seek care or treatment in person

## 2022-01-03 NOTE — LETTER
January 3, 2022     Patient: Ramandeep Crane   YOB: 1965   Date of Visit: 1/3/2022       To Whom it May Concern:    Soraida Norton is under my professional care  He was evaluated via Telephone on 1/3/2022  He was diagnosed with Covid-19 on 12/14/2021  It has been more than 10 days since his symptoms started  He is cleared to go back to work  He may return to work on 01/05/2022  If you have any questions or concerns, please don't hesitate to call           Sincerely,          Silas Starr MD        CC: No Recipients

## 2022-03-22 ENCOUNTER — OFFICE VISIT (OUTPATIENT)
Dept: FAMILY MEDICINE CLINIC | Facility: CLINIC | Age: 57
End: 2022-03-22
Payer: COMMERCIAL

## 2022-03-22 ENCOUNTER — APPOINTMENT (OUTPATIENT)
Dept: RADIOLOGY | Facility: CLINIC | Age: 57
End: 2022-03-22
Payer: COMMERCIAL

## 2022-03-22 VITALS
SYSTOLIC BLOOD PRESSURE: 134 MMHG | OXYGEN SATURATION: 98 % | DIASTOLIC BLOOD PRESSURE: 82 MMHG | HEIGHT: 75 IN | BODY MASS INDEX: 31.21 KG/M2 | WEIGHT: 251 LBS | HEART RATE: 64 BPM | TEMPERATURE: 97.8 F

## 2022-03-22 DIAGNOSIS — R05.9 COUGH: ICD-10-CM

## 2022-03-22 DIAGNOSIS — Z86.16 HISTORY OF COVID-19: Primary | ICD-10-CM

## 2022-03-22 PROCEDURE — 99214 OFFICE O/P EST MOD 30 MIN: CPT | Performed by: PHYSICIAN ASSISTANT

## 2022-03-22 PROCEDURE — 71046 X-RAY EXAM CHEST 2 VIEWS: CPT

## 2022-03-22 PROCEDURE — 1036F TOBACCO NON-USER: CPT | Performed by: PHYSICIAN ASSISTANT

## 2022-03-22 PROCEDURE — 3725F SCREEN DEPRESSION PERFORMED: CPT | Performed by: PHYSICIAN ASSISTANT

## 2022-03-22 PROCEDURE — 3008F BODY MASS INDEX DOCD: CPT | Performed by: PHYSICIAN ASSISTANT

## 2022-03-22 RX ORDER — PREDNISONE 10 MG/1
TABLET ORAL
Qty: 26 TABLET | Refills: 0 | Status: SHIPPED | OUTPATIENT
Start: 2022-03-22 | End: 2022-04-07

## 2022-03-22 RX ORDER — DEXTROMETHORPHAN HYDROBROMIDE AND PROMETHAZINE HYDROCHLORIDE 15; 6.25 MG/5ML; MG/5ML
5 SOLUTION ORAL
Qty: 118 ML | Refills: 0 | Status: SHIPPED | OUTPATIENT
Start: 2022-03-22

## 2022-03-22 NOTE — PROGRESS NOTES
Assessment/Plan:    No problem-specific Assessment & Plan notes found for this encounter  Diagnoses and all orders for this visit:    History of COVID-19    Cough  -     XR chest pa & lateral; Future  -     predniSONE 10 mg tablet; Take 3 tabs bid x 2 days, take 2 tabs bid x 2 days, take 1 tab bid x 2 days, take 1 tab daily x 2 days  -     Promethazine-DM (PHENERGAN-DM) 6 25-15 mg/5 mL oral syrup; Take 5 mL by mouth daily at bedtime as needed for cough        CXR today, trial of po steroids and cough medicine at night time  If cxr negative discussed proceeding with PFT, CT chest (previous smoker)  Discussed lingering cough from COVID vs GERD vs underlying pulmonary disease     Subjective:      Patient ID: Capri Logan is a 60 y o  male  Patient presents for follow up on chronic cough that has been ongoing since December  Had COVID in December and states he has been battling this cough since  The cough is intermittent although worse at night  States it wakes everyone up in the house  He gets headaches from the coughing fits  Has tried albuterol before and unable to tolerate  He takes mucinex from time to time  Cough is very dry  He has noticed a   covid worse at night   Intermittent since December   Quit smoking in 2007 2-3 ppd x 30 years  Has hx of GERD not taking any medication currently  States he has not had any symptoms or change in diet  Denies CP, pain on deep inspiration or fevers        The following portions of the patient's history were reviewed and updated as appropriate: allergies, current medications, past family history, past social history, past surgical history and problem list     Review of Systems   Constitutional: Negative for chills, fatigue and fever  HENT: Negative for congestion, ear pain, sinus pain, sore throat and trouble swallowing  Eyes: Negative for pain, discharge and redness  Respiratory: Positive for cough   Negative for chest tightness, shortness of breath and wheezing  Cardiovascular: Negative for chest pain, palpitations and leg swelling  Gastrointestinal: Negative for abdominal pain, diarrhea, nausea and vomiting  Musculoskeletal: Negative for arthralgias, joint swelling and myalgias  Skin: Negative for rash  Neurological: Negative for dizziness, weakness, numbness and headaches  Objective:      /82   Pulse 64   Temp 97 8 °F (36 6 °C)   Ht 6' 3" (1 905 m)   Wt 114 kg (251 lb)   SpO2 98%   BMI 31 37 kg/m²          Physical Exam  Vitals and nursing note reviewed  Constitutional:       Appearance: He is well-developed  HENT:      Head: Normocephalic  Right Ear: Hearing and tympanic membrane normal       Left Ear: Hearing and tympanic membrane normal       Nose: No mucosal edema  Mouth/Throat:      Pharynx: Uvula midline  Posterior oropharyngeal erythema present  Cardiovascular:      Rate and Rhythm: Normal rate and regular rhythm  Pulmonary:      Effort: Pulmonary effort is normal       Breath sounds: Normal breath sounds

## 2022-03-29 ENCOUNTER — APPOINTMENT (EMERGENCY)
Dept: CT IMAGING | Facility: HOSPITAL | Age: 57
End: 2022-03-29
Payer: COMMERCIAL

## 2022-03-29 ENCOUNTER — HOSPITAL ENCOUNTER (EMERGENCY)
Facility: HOSPITAL | Age: 57
Discharge: HOME | End: 2022-03-29
Attending: EMERGENCY MEDICINE
Payer: COMMERCIAL

## 2022-03-29 VITALS
RESPIRATION RATE: 18 BRPM | OXYGEN SATURATION: 100 % | HEART RATE: 70 BPM | SYSTOLIC BLOOD PRESSURE: 140 MMHG | DIASTOLIC BLOOD PRESSURE: 60 MMHG | TEMPERATURE: 98.1 F

## 2022-03-29 DIAGNOSIS — I71.4 AAA (ABDOMINAL AORTIC ANEURYSM) (HCC): ICD-10-CM

## 2022-03-29 DIAGNOSIS — N20.1 LEFT URETERAL STONE: Primary | ICD-10-CM

## 2022-03-29 LAB
ALBUMIN SERPL BCP-MCNC: 4.1 G/DL (ref 3.5–5)
ALP SERPL-CCNC: 64 U/L (ref 46–116)
ALT SERPL W P-5'-P-CCNC: 31 U/L (ref 12–78)
ANION GAP SERPL CALCULATED.3IONS-SCNC: 11 MMOL/L (ref 4–13)
ANION GAP SERPL CALCULATED.3IONS-SCNC: 17 MMOL/L (ref 4–13)
AST SERPL W P-5'-P-CCNC: 14 U/L (ref 5–45)
BASOPHILS # BLD AUTO: 0.06 THOUSANDS/ΜL (ref 0–0.1)
BASOPHILS NFR BLD AUTO: 0 % (ref 0–1)
BILIRUB SERPL-MCNC: 1.29 MG/DL (ref 0.2–1)
BILIRUB UR QL STRIP: NEGATIVE
BUN SERPL-MCNC: 25 MG/DL (ref 5–25)
BUN SERPL-MCNC: 26 MG/DL (ref 5–25)
CALCIUM SERPL-MCNC: 7.9 MG/DL (ref 8.3–10.1)
CALCIUM SERPL-MCNC: 9.2 MG/DL (ref 8.3–10.1)
CHLORIDE SERPL-SCNC: 102 MMOL/L (ref 100–108)
CHLORIDE SERPL-SCNC: 108 MMOL/L (ref 100–108)
CLARITY UR: CLEAR
CO2 SERPL-SCNC: 19 MMOL/L (ref 21–32)
CO2 SERPL-SCNC: 22 MMOL/L (ref 21–32)
COLOR UR: YELLOW
CREAT SERPL-MCNC: 1.09 MG/DL (ref 0.6–1.3)
CREAT SERPL-MCNC: 1.14 MG/DL (ref 0.6–1.3)
EOSINOPHIL # BLD AUTO: 0.13 THOUSAND/ΜL (ref 0–0.61)
EOSINOPHIL NFR BLD AUTO: 1 % (ref 0–6)
ERYTHROCYTE [DISTWIDTH] IN BLOOD BY AUTOMATED COUNT: 13.6 % (ref 11.6–15.1)
GFR SERPL CREATININE-BSD FRML MDRD: 70 ML/MIN/1.73SQ M
GFR SERPL CREATININE-BSD FRML MDRD: 74 ML/MIN/1.73SQ M
GLUCOSE SERPL-MCNC: 112 MG/DL (ref 65–140)
GLUCOSE SERPL-MCNC: 84 MG/DL (ref 65–140)
GLUCOSE UR STRIP-MCNC: NEGATIVE MG/DL
HCT VFR BLD AUTO: 45.2 % (ref 36.5–49.3)
HGB BLD-MCNC: 15 G/DL (ref 12–17)
HGB UR QL STRIP.AUTO: NEGATIVE
IMM GRANULOCYTES # BLD AUTO: 0.34 THOUSAND/UL (ref 0–0.2)
IMM GRANULOCYTES NFR BLD AUTO: 2 % (ref 0–2)
KETONES UR STRIP-MCNC: NEGATIVE MG/DL
LEUKOCYTE ESTERASE UR QL STRIP: NEGATIVE
LIPASE SERPL-CCNC: 101 U/L (ref 73–393)
LYMPHOCYTES # BLD AUTO: 4.42 THOUSANDS/ΜL (ref 0.6–4.47)
LYMPHOCYTES NFR BLD AUTO: 29 % (ref 14–44)
MCH RBC QN AUTO: 29.2 PG (ref 26.8–34.3)
MCHC RBC AUTO-ENTMCNC: 33.2 G/DL (ref 31.4–37.4)
MCV RBC AUTO: 88 FL (ref 82–98)
MONOCYTES # BLD AUTO: 1.27 THOUSAND/ΜL (ref 0.17–1.22)
MONOCYTES NFR BLD AUTO: 8 % (ref 4–12)
NEUTROPHILS # BLD AUTO: 9.23 THOUSANDS/ΜL (ref 1.85–7.62)
NEUTS SEG NFR BLD AUTO: 60 % (ref 43–75)
NITRITE UR QL STRIP: NEGATIVE
NRBC BLD AUTO-RTO: 0 /100 WBCS
PH UR STRIP.AUTO: 5.5 [PH]
PLATELET # BLD AUTO: 329 THOUSANDS/UL (ref 149–390)
PMV BLD AUTO: 10.7 FL (ref 8.9–12.7)
POTASSIUM SERPL-SCNC: 2.9 MMOL/L (ref 3.5–5.3)
POTASSIUM SERPL-SCNC: 3.4 MMOL/L (ref 3.5–5.3)
PROT SERPL-MCNC: 7.5 G/DL (ref 6.4–8.2)
PROT UR STRIP-MCNC: NEGATIVE MG/DL
RBC # BLD AUTO: 5.14 MILLION/UL (ref 3.88–5.62)
SODIUM SERPL-SCNC: 138 MMOL/L (ref 136–145)
SODIUM SERPL-SCNC: 141 MMOL/L (ref 136–145)
SP GR UR STRIP.AUTO: 1.01 (ref 1–1.03)
UROBILINOGEN UR QL STRIP.AUTO: 0.2 E.U./DL
WBC # BLD AUTO: 15.45 THOUSAND/UL (ref 4.31–10.16)

## 2022-03-29 PROCEDURE — 96361 HYDRATE IV INFUSION ADD-ON: CPT

## 2022-03-29 PROCEDURE — 96375 TX/PRO/DX INJ NEW DRUG ADDON: CPT

## 2022-03-29 PROCEDURE — 85025 COMPLETE CBC W/AUTO DIFF WBC: CPT | Performed by: PHYSICIAN ASSISTANT

## 2022-03-29 PROCEDURE — 96374 THER/PROPH/DIAG INJ IV PUSH: CPT

## 2022-03-29 PROCEDURE — 81003 URINALYSIS AUTO W/O SCOPE: CPT | Performed by: PHYSICIAN ASSISTANT

## 2022-03-29 PROCEDURE — 74177 CT ABD & PELVIS W/CONTRAST: CPT

## 2022-03-29 PROCEDURE — 80048 BASIC METABOLIC PNL TOTAL CA: CPT | Performed by: PHYSICIAN ASSISTANT

## 2022-03-29 PROCEDURE — 83690 ASSAY OF LIPASE: CPT | Performed by: PHYSICIAN ASSISTANT

## 2022-03-29 PROCEDURE — 99285 EMERGENCY DEPT VISIT HI MDM: CPT | Performed by: PHYSICIAN ASSISTANT

## 2022-03-29 PROCEDURE — 96376 TX/PRO/DX INJ SAME DRUG ADON: CPT

## 2022-03-29 PROCEDURE — G1004 CDSM NDSC: HCPCS

## 2022-03-29 PROCEDURE — 99284 EMERGENCY DEPT VISIT MOD MDM: CPT

## 2022-03-29 PROCEDURE — 36415 COLL VENOUS BLD VENIPUNCTURE: CPT | Performed by: PHYSICIAN ASSISTANT

## 2022-03-29 PROCEDURE — 80053 COMPREHEN METABOLIC PANEL: CPT | Performed by: PHYSICIAN ASSISTANT

## 2022-03-29 RX ORDER — OXYCODONE HYDROCHLORIDE 5 MG/1
5 TABLET ORAL EVERY 6 HOURS PRN
Qty: 8 TABLET | Refills: 0 | Status: SHIPPED | OUTPATIENT
Start: 2022-03-29 | End: 2022-03-31

## 2022-03-29 RX ORDER — POTASSIUM CHLORIDE 14.9 MG/ML
20 INJECTION INTRAVENOUS ONCE
Status: DISCONTINUED | OUTPATIENT
Start: 2022-03-29 | End: 2022-03-29

## 2022-03-29 RX ORDER — KETOROLAC TROMETHAMINE 30 MG/ML
15 INJECTION, SOLUTION INTRAMUSCULAR; INTRAVENOUS ONCE
Status: COMPLETED | OUTPATIENT
Start: 2022-03-29 | End: 2022-03-29

## 2022-03-29 RX ORDER — ONDANSETRON 4 MG/1
4 TABLET, ORALLY DISINTEGRATING ORAL EVERY 6 HOURS PRN
Qty: 20 TABLET | Refills: 0 | Status: SHIPPED | OUTPATIENT
Start: 2022-03-29 | End: 2022-04-07

## 2022-03-29 RX ORDER — POTASSIUM CHLORIDE 20 MEQ/1
40 TABLET, EXTENDED RELEASE ORAL ONCE
Status: COMPLETED | OUTPATIENT
Start: 2022-03-29 | End: 2022-03-29

## 2022-03-29 RX ORDER — ONDANSETRON 2 MG/ML
4 INJECTION INTRAMUSCULAR; INTRAVENOUS ONCE
Status: COMPLETED | OUTPATIENT
Start: 2022-03-29 | End: 2022-03-29

## 2022-03-29 RX ORDER — HYDROMORPHONE HCL/PF 1 MG/ML
0.5 SYRINGE (ML) INJECTION ONCE
Status: COMPLETED | OUTPATIENT
Start: 2022-03-29 | End: 2022-03-29

## 2022-03-29 RX ORDER — TAMSULOSIN HYDROCHLORIDE 0.4 MG/1
0.4 CAPSULE ORAL
Qty: 5 CAPSULE | Refills: 0 | Status: SHIPPED | OUTPATIENT
Start: 2022-03-29 | End: 2022-04-07

## 2022-03-29 RX ORDER — POTASSIUM CHLORIDE 20 MEQ/1
20 TABLET, EXTENDED RELEASE ORAL ONCE
Status: COMPLETED | OUTPATIENT
Start: 2022-03-29 | End: 2022-03-29

## 2022-03-29 RX ORDER — TAMSULOSIN HYDROCHLORIDE 0.4 MG/1
0.4 CAPSULE ORAL ONCE
Status: COMPLETED | OUTPATIENT
Start: 2022-03-29 | End: 2022-03-29

## 2022-03-29 RX ADMIN — HYDROMORPHONE HYDROCHLORIDE 0.5 MG: 1 INJECTION, SOLUTION INTRAMUSCULAR; INTRAVENOUS; SUBCUTANEOUS at 12:51

## 2022-03-29 RX ADMIN — ONDANSETRON 4 MG: 2 INJECTION INTRAMUSCULAR; INTRAVENOUS at 11:40

## 2022-03-29 RX ADMIN — POTASSIUM CHLORIDE 40 MEQ: 1500 TABLET, EXTENDED RELEASE ORAL at 13:39

## 2022-03-29 RX ADMIN — TAMSULOSIN HYDROCHLORIDE 0.4 MG: 0.4 CAPSULE ORAL at 13:58

## 2022-03-29 RX ADMIN — HYDROMORPHONE HYDROCHLORIDE 0.5 MG: 1 INJECTION, SOLUTION INTRAMUSCULAR; INTRAVENOUS; SUBCUTANEOUS at 11:30

## 2022-03-29 RX ADMIN — ONDANSETRON 4 MG: 2 INJECTION INTRAMUSCULAR; INTRAVENOUS at 12:54

## 2022-03-29 RX ADMIN — SODIUM CHLORIDE 1000 ML: 0.9 INJECTION, SOLUTION INTRAVENOUS at 12:52

## 2022-03-29 RX ADMIN — IOHEXOL 100 ML: 350 INJECTION, SOLUTION INTRAVENOUS at 12:31

## 2022-03-29 RX ADMIN — SODIUM CHLORIDE 1000 ML: 0.9 INJECTION, SOLUTION INTRAVENOUS at 11:40

## 2022-03-29 RX ADMIN — POTASSIUM CHLORIDE 20 MEQ: 1500 TABLET, EXTENDED RELEASE ORAL at 15:12

## 2022-03-29 RX ADMIN — KETOROLAC TROMETHAMINE 15 MG: 30 INJECTION, SOLUTION INTRAMUSCULAR at 12:51

## 2022-03-29 NOTE — ED NOTES
Pt is discharged to home at this time  VSS  IV removed  AVS given and note given  Pt expressed understanding  Meds sent to pharmacy       Sonny Barry RN  03/29/22 [id number]

## 2022-03-29 NOTE — ED PROVIDER NOTES
History  Chief Complaint   Patient presents with    Flank Pain     pt c/o L flank pain x 1 hour that radiates into L abdomen  pt also c/o urge to go but unable to urinate     61yo male with a history of hyperlipidemia presenting for evaluation of abdominal and flank pain that began a few hours ago  Pain began abruptly while at work this morning  Pain initially started in the left flank and now has migrated to the left side of the abdomen  He describes the pain as burning and severe  Patient also has the urge to urinate and have a bowel movement but is unable to  He also reports nausea and has had several episodes of vomiting  No fevers or chills  No previous abdominal surgeries  No prior history of kidney stones  History provided by:  Patient   used: No    Flank Pain  Pain location:  L flank  Pain quality: burning    Pain radiates to:  LUQ and LLQ  Pain severity:  Severe  Onset quality:  Sudden  Timing:  Constant  Progression:  Waxing and waning  Chronicity:  New  Relieved by:  Nothing  Worsened by:  Nothing  Associated symptoms: nausea and vomiting    Associated symptoms: no chest pain, no chills, no diarrhea, no dysuria, no fever and no shortness of breath        Prior to Admission Medications   Prescriptions Last Dose Informant Patient Reported? Taking?    Crisaborocallie Olivares) 2 % OINT   No No   Sig: Apply topically twice a day Monday to Friday to trunk and extremities   FLUoxetine (PROzac) 40 MG capsule   No No   Sig: Take 1 capsule (40 mg total) by mouth daily at bedtime   Promethazine-DM (PHENERGAN-DM) 6 25-15 mg/5 mL oral syrup   No No   Sig: Take 5 mL by mouth daily at bedtime as needed for cough   albuterol (PROVENTIL HFA,VENTOLIN HFA) 90 mcg/act inhaler   No No   Sig: Inhale 2 puffs every 6 (six) hours as needed for wheezing or shortness of breath   Patient not taking: Reported on 12/23/2021    budesonide (Pulmicort Flexhaler) 180 MCG/ACT inhaler   No No   Sig: Inhale 4 puffs 2 (two) times a day for 12 days Rinse mouth after use    hydrOXYzine HCL (ATARAX) 25 mg tablet   No No   Sig: Take 1 tablet (25 mg total) by mouth daily at bedtime   multivitamin (THERAGRAN) TABS   Yes No   Sig: Take 1 tablet by mouth daily   predniSONE 10 mg tablet   No No   Sig: Take 3 tabs bid x 2 days, take 2 tabs bid x 2 days, take 1 tab bid x 2 days, take 1 tab daily x 2 days   rosuvastatin (CRESTOR) 5 mg tablet   No No   Sig: Take 1 tablet (5 mg total) by mouth daily at bedtime      Facility-Administered Medications: None       Past Medical History:   Diagnosis Date    Anxiety     Eczema     Gastric ulcer     Hypercholesterolemia     Impacted cerumen of left ear 2019    Sleep apnea        Past Surgical History:   Procedure Laterality Date    CYST REMOVAL      EGD      EXAMINATION UNDER ANESTHESIA N/A 2020    Procedure: DRUG INDUCED SLEEP ENDOSCOPY;  Surgeon: Alberto Simmons MD;  [facility]: [facility];  Service: ENT    LASIK         Family History   Problem Relation Age of Onset    Cancer Mother         ovarian and stomach    Coronary artery disease Mother     Coronary artery disease Father     Cancer Father         pancratic     I have reviewed and agree with the history as documented  E-Cigarette/Vaping    E-Cigarette Use Never User      E-Cigarette/Vaping Substances    Nicotine No     THC No     CBD No     Flavoring No     Other No     Unknown No      Social History     Tobacco Use    Smoking status: Former Smoker     Quit date:      Years since quittin 2    Smokeless tobacco: Never Used   Vaping Use    Vaping Use: Never used   Substance Use Topics    Alcohol use: Yes     Comment: socially    Drug use: Never       Review of Systems   Constitutional: Negative for chills and fever  HENT: Negative for drooling and voice change  Eyes: Negative for discharge and redness  Respiratory: Negative for shortness of breath and stridor      Cardiovascular: Negative for chest pain and leg swelling  Gastrointestinal: Positive for abdominal pain, nausea and vomiting  Negative for diarrhea  Genitourinary: Positive for flank pain  Negative for dysuria  Musculoskeletal: Negative for neck pain and neck stiffness  Skin: Negative for color change and rash  Neurological: Negative for seizures and syncope  Psychiatric/Behavioral: Negative for confusion  The patient is not nervous/anxious  All other systems reviewed and are negative  Physical Exam  Physical Exam  Vitals and nursing note reviewed  Constitutional:       General: He is in acute distress  Appearance: He is well-developed  He is not diaphoretic  Comments: Uncomfortable, actively vomiting   HENT:      Head: Normocephalic and atraumatic  Right Ear: External ear normal       Left Ear: External ear normal    Eyes:      General: No scleral icterus  Right eye: No discharge  Left eye: No discharge  Conjunctiva/sclera: Conjunctivae normal    Cardiovascular:      Rate and Rhythm: Normal rate and regular rhythm  Heart sounds: Normal heart sounds  No murmur heard  Pulmonary:      Effort: Pulmonary effort is normal  No respiratory distress  Breath sounds: Normal breath sounds  No stridor  No wheezing or rales  Abdominal:      General: Bowel sounds are normal  There is no distension  Palpations: Abdomen is soft  Tenderness: There is abdominal tenderness in the left lower quadrant  There is no guarding  Musculoskeletal:         General: No deformity  Normal range of motion  Cervical back: Normal range of motion and neck supple  Skin:     General: Skin is warm and dry  Neurological:      Mental Status: He is alert  He is not disoriented  GCS: GCS eye subscore is 4  GCS verbal subscore is 5  GCS motor subscore is 6     Psychiatric:         Behavior: Behavior normal          Vital Signs  ED Triage Vitals   Temperature Pulse Respirations Blood Pressure SpO2   03/29/22 1038 03/29/22 1038 03/29/22 1038 03/29/22 1040 03/29/22 1038   98 1 °F (36 7 °C) 71 20 140/78 100 %      Temp Source Heart Rate Source Patient Position - Orthostatic VS BP Location FiO2 (%)   03/29/22 1038 03/29/22 1038 03/29/22 1038 03/29/22 1038 --   Oral Monitor Sitting Left arm       Pain Score       03/29/22 1513       No Pain           Vitals:    03/29/22 1040 03/29/22 1129 03/29/22 1255 03/29/22 1513   BP: 140/78 (!) 183/86 143/68 140/60   Pulse:  59 70 70   Patient Position - Orthostatic VS:  Lying           Visual Acuity      ED Medications  Medications   sodium chloride 0 9 % bolus 1,000 mL (0 mL Intravenous Stopped 3/29/22 1401)   ondansetron (ZOFRAN) injection 4 mg (4 mg Intravenous Given 3/29/22 1140)   HYDROmorphone (DILAUDID) injection 0 5 mg (0 5 mg Intravenous Given 3/29/22 1130)   sodium chloride 0 9 % bolus 1,000 mL (0 mL Intravenous Stopped 3/29/22 1425)   potassium chloride (K-DUR,KLOR-CON) CR tablet 40 mEq (40 mEq Oral Given 3/29/22 1339)   iohexol (OMNIPAQUE) 350 MG/ML injection (SINGLE-DOSE) 100 mL (100 mL Intravenous Given 3/29/22 1231)   HYDROmorphone (DILAUDID) injection 0 5 mg (0 5 mg Intravenous Given 3/29/22 1251)   ketorolac (TORADOL) injection 15 mg (15 mg Intravenous Given 3/29/22 1251)   ondansetron (ZOFRAN) injection 4 mg (4 mg Intravenous Given 3/29/22 1254)   tamsulosin (FLOMAX) capsule 0 4 mg (0 4 mg Oral Given 3/29/22 1358)   potassium chloride (K-DUR,KLOR-CON) CR tablet 20 mEq (20 mEq Oral Given 3/29/22 1512)       Diagnostic Studies  Results Reviewed     Procedure Component Value Units Date/Time    UA w Reflex to Microscopic w Reflex to Culture [[id number]] Collected: 03/29/22 1517    Lab Status: Final result Specimen: Urine Updated: 03/29/22 1527     Color, UA Yellow     Clarity, UA Clear     Specific Gravity, UA 1 015     pH, UA 5 5     Leukocytes, UA Negative     Nitrite, UA Negative     Protein, UA Negative mg/dl      Glucose, UA Negative mg/dl Ketones, UA Negative mg/dl      Urobilinogen, UA 0 2 E U /dl      Bilirubin, UA Negative     Blood, UA Negative    Basic metabolic panel [[id number]]  (Abnormal) Collected: 03/29/22 1423    Lab Status: Final result Specimen: Blood from Arm, Right Updated: 03/29/22 1449     Sodium 141 mmol/L      Potassium 3 4 mmol/L      Chloride 108 mmol/L      CO2 22 mmol/L      ANION GAP 11 mmol/L      BUN 25 mg/dL      Creatinine 1 09 mg/dL      Glucose 84 mg/dL      Calcium 7 9 mg/dL      eGFR 74 ml/min/1 73sq m     Narrative:      National Kidney Disease Foundation guidelines for Chronic Kidney Disease (CKD):     Stage 1 with normal or high GFR (GFR > 90 mL/min/1 73 square meters)    Stage 2 Mild CKD (GFR = 60-89 mL/min/1 73 square meters)    Stage 3A Moderate CKD (GFR = 45-59 mL/min/1 73 square meters)    Stage 3B Moderate CKD (GFR = 30-44 mL/min/1 73 square meters)    Stage 4 Severe CKD (GFR = 15-29 mL/min/1 73 square meters)    Stage 5 End Stage CKD (GFR <15 mL/min/1 73 square meters)  Note: GFR calculation is accurate only with a steady state creatinine    Lipase [[id number]]  (Normal) Collected: 03/29/22 1132    Lab Status: Final result Specimen: Blood from Arm, Left Updated: 03/29/22 1202     Lipase 101 u/L     Comprehensive metabolic panel [[id number]]  (Abnormal) Collected: 03/29/22 1132    Lab Status: Final result Specimen: Blood from Arm, Left Updated: 03/29/22 1202     Sodium 138 mmol/L      Potassium 2 9 mmol/L      Chloride 102 mmol/L      CO2 19 mmol/L      ANION GAP 17 mmol/L      BUN 26 mg/dL      Creatinine 1 14 mg/dL      Glucose 112 mg/dL      Calcium 9 2 mg/dL      AST 14 U/L      ALT 31 U/L      Alkaline Phosphatase 64 U/L      Total Protein 7 5 g/dL      Albumin 4 1 g/dL      Total Bilirubin 1 29 mg/dL      eGFR 70 ml/min/1 73sq m     Narrative:      [place] guidelines for Chronic Kidney Disease (CKD):     Stage 1 with normal or high GFR (GFR > 90 mL/min/1 73 square meters)    Stage 2 Mild CKD (GFR = 60-89 mL/min/1 73 square meters)    Stage 3A Moderate CKD (GFR = 45-59 mL/min/1 73 square meters)    Stage 3B Moderate CKD (GFR = 30-44 mL/min/1 73 square meters)    Stage 4 Severe CKD (GFR = 15-29 mL/min/1 73 square meters)    Stage 5 End Stage CKD (GFR <15 mL/min/1 73 square meters)  Note: GFR calculation is accurate only with a steady state creatinine    CBC and differential [[id number]]  (Abnormal) Collected: 03/29/22 1133    Lab Status: Final result Specimen: Blood from Arm, Left Updated: 03/29/22 1143     WBC 15 45 Thousand/uL      RBC 5 14 Million/uL      Hemoglobin 15 0 g/dL      Hematocrit 45 2 %      MCV 88 fL      MCH 29 2 pg      MCHC 33 2 g/dL      RDW 13 6 %      MPV 10 7 fL      Platelets 538 Thousands/uL      nRBC 0 /100 WBCs      Neutrophils Relative 60 %      Immat GRANS % 2 %      Lymphocytes Relative 29 %      Monocytes Relative 8 %      Eosinophils Relative 1 %      Basophils Relative 0 %      Neutrophils Absolute 9 23 Thousands/µL      Immature Grans Absolute 0 34 Thousand/uL      Lymphocytes Absolute 4 42 Thousands/µL      Monocytes Absolute 1 27 Thousand/µL      Eosinophils Absolute 0 13 Thousand/µL      Basophils Absolute 0 06 Thousands/µL                  CT abdomen pelvis with contrast   Final Result by Diallo Munson MD (03/29 1253)      4 mm left UVJ calculus with mild left hydroureteronephrosis  Punctate nonobstructing renal calculi  3 4 x 2 9 cm infrarenal abdominal aortic aneurysm  Left adrenal nodule containing macroscopic fat consistent with a myelolipoma  Workstation performed: [id number]                    Procedures  Procedures         ED Course               SBIRT 22yo+      Most Recent Value   SBIRT (23 yo +)    In order to provide better care to our patients, we are screening all of our patients for alcohol and drug use  Would it be okay to ask you these screening questions?  Unable to answer at this time Filed at: 03/29/2022 1120                    MDM  Number of Diagnoses or Management Options  AAA (abdominal aortic aneurysm) [facility]): new and requires workup  Left ureteral stone: new and requires workup  Diagnosis management comments: 61yo male presenting for abdominal/flank pain  Started abruptly a few hours ago  Associated with n/v and urinary urgency  He is hypertensive on arrival with otherwise normal vital signs  No signs of peritonitis on abdominal exam  Differential diagnosis includes but is not limited to: gastritis, GERD, pancreatitis, hepatitis, cholecystitis, cholelithiasis, colitis, diverticulitis, appendicitis, mesenteric adenitis, UTI, pyelonephritis, SBO, constipation, kidney stone, musculoskeletal, nonspecific abdominal pain  Initial ED plan: Check abdominal labs, UA, and CT abdomen  IV Zofran, Dilaudid, and fluid bolus for symptoms  Final assessment: Labs reveal a leukocytosis with a WBC of 15, suspect this is reactive 2/2 vomiting  There is also an anion gap metabolic acidosis with a bicarb of 19  Potassium also low at 2 9 likely in the setting of GI losses  CT abdomen reveals a 4mm stone at the left UVJ  There is also a 3 4cm AAA, nonruptured  UA bland without signs of infection  BMP repeated after fluid resuscitation and metabolic acidosis resolved  No indication for admission at this time  Supportive care discussed  Scripts provided for Zofran, Flomax, and oxycodone  Advised close urology follow-up  ED return precautions discussed  Patient expressed understanding and is agreeable to plan  Patient discharged in stable condition           Amount and/or Complexity of Data Reviewed  Clinical lab tests: ordered and reviewed  Tests in the radiology section of CPT®: ordered and reviewed  Review and summarize past medical records: yes  Independent visualization of images, tracings, or specimens: yes    Risk of Complications, Morbidity, and/or Mortality  Presenting problems: moderate  Diagnostic procedures: moderate  Management options: moderate    Patient Progress  Patient progress: stable      Disposition  Final diagnoses:   Left ureteral stone   AAA (abdominal aortic aneurysm) ([address] )     Time reflects when diagnosis was documented in both MDM as applicable and the Disposition within this note     Time User Action Codes Description Comment    3/29/2022  3:28  [address], [place] [N20 1] Left ureteral stone     3/29/2022  3:28  [facility] [place], [place] [I71 4] AAA (abdominal aortic aneurysm) [facility])       ED Disposition     ED Disposition Condition Date/Time Comment    Discharge Stable Tue Mar 29, 2022  3:28 PM Ramandeep Crane discharge to home/self care              Follow-up Information     Follow up With Specialties Details Why [address] [place] [address] MD [facility] Schedule an appointment as soon as possible for a visit   [phone]   [address] [facility]  [address] [facility] Yassine Raineyails Urology Schedule an appointment as soon as possible for a visit   [address]  1121 [facility] [id number]  [address] [place] [address] For Urology Yassine Quails, [address] [facility] [address], [address], Yassine Ballesteros, [place], [address] Emergency Department Emergency Medicine  If symptoms worsen 34 [facility] 109 [facility] Emergency Department, [address] [place], [place], [address]          Discharge Medication List as of 3/29/2022  3:41 PM      START taking these medications    Details   ondansetron (Zofran ODT) 4 mg disintegrating tablet Take 1 tablet (4 mg total) by mouth every 6 (six) hours as needed for nausea or vomiting, Starting Tue 3/29/2022, Normal      oxyCODONE (Roxicodone) 5 immediate release tablet Take 1 tablet (5 mg total) by mouth every 6 (six) hours as needed for moderate pain or severe pain for up to 2 days Max Daily Amount: 20 mg, Starting Tue 3/29/2022, Until Thu 3/31/2022 at 2359, Normal      tamsulosin (FLOMAX) 0 4 mg Take 1 capsule (0 4 mg total) by mouth daily with dinner, Starting Tue 3/29/2022, Normal         CONTINUE these medications which have NOT CHANGED    Details   albuterol (PROVENTIL HFA,VENTOLIN HFA) 90 mcg/act inhaler Inhale 2 puffs every 6 (six) hours as needed for wheezing or shortness of breath, Starting Tue 12/14/2021, Print      budesonide (Pulmicort Flexhaler) 180 MCG/ACT inhaler Inhale 4 puffs 2 (two) times a day for 12 days Rinse mouth after use , Starting Thu 12/23/2021, Until Tue 1/4/2022, Print      Crisaborole Gilbert Nash) 2 % OINT Apply topically twice a day Monday to Friday to trunk and extremities, Normal      FLUoxetine (PROzac) 40 MG capsule Take 1 capsule (40 mg total) by mouth daily at bedtime, Starting Fri 5/14/2021, Normal      hydrOXYzine HCL (ATARAX) 25 mg tablet Take 1 tablet (25 mg total) by mouth daily at bedtime, Starting Fri 5/14/2021, Normal      multivitamin (THERAGRAN) TABS Take 1 tablet by mouth daily, Historical Med      predniSONE 10 mg tablet Take 3 tabs bid x 2 days, take 2 tabs bid x 2 days, take 1 tab bid x 2 days, take 1 tab daily x 2 days, Normal      Promethazine-DM (PHENERGAN-DM) 6 25-15 mg/5 mL oral syrup Take 5 mL by mouth daily at bedtime as needed for cough, Starting Tue 3/22/2022, Normal      rosuvastatin (CRESTOR) 5 mg tablet Take 1 tablet (5 mg total) by mouth daily at bedtime, Starting Fri 5/14/2021, Normal                 PDMP Review       Value Time User    PDMP Reviewed  Yes 9/24/2020  9:00 AM Misael Motley MD          ED Provider  Electronically Signed by           Soheila Ruano PA-C  03/29/22 1959

## 2022-03-29 NOTE — DISCHARGE INSTRUCTIONS
Take Flomax and strain your urine until stone has passed  Drink plenty of fluids and hydrate  Take Tylenol 650mg and ibuprofen 400mg every 6 hours as needed for pain  Take oxycodone as needed for severe breakthrough pain  Take Zofran as needed for nausea  Please call today to schedule a follow-up with urology  Return to the ER with any worsening symptoms, uncontrolled pain, fevers, inability to urinate  **Please follow-up with your family doctor regarding the abdominal aortic aneurysm seen on your CT

## 2022-03-29 NOTE — Clinical Note
Marshall Mccrary was seen and treated in our emergency department on 3/29/2022  Diagnosis:     Ad Heredia  may return to work on return date  He may return on this date: 03/31/2022         If you have any questions or concerns, please don't hesitate to call        Gena Claudio RN    ______________________________           _______________          _______________  Hospital Representative                              Date                                Time

## 2022-04-07 ENCOUNTER — OFFICE VISIT (OUTPATIENT)
Dept: FAMILY MEDICINE CLINIC | Facility: CLINIC | Age: 57
End: 2022-04-07
Payer: COMMERCIAL

## 2022-04-07 VITALS
WEIGHT: 253 LBS | HEIGHT: 75 IN | HEART RATE: 65 BPM | DIASTOLIC BLOOD PRESSURE: 76 MMHG | TEMPERATURE: 98.4 F | OXYGEN SATURATION: 97 % | BODY MASS INDEX: 31.46 KG/M2 | SYSTOLIC BLOOD PRESSURE: 116 MMHG

## 2022-04-07 DIAGNOSIS — R05.9 COUGH: Primary | ICD-10-CM

## 2022-04-07 DIAGNOSIS — K44.9 HIATAL HERNIA: ICD-10-CM

## 2022-04-07 DIAGNOSIS — I71.4 AAA (ABDOMINAL AORTIC ANEURYSM) WITHOUT RUPTURE (HCC): ICD-10-CM

## 2022-04-07 PROCEDURE — 3008F BODY MASS INDEX DOCD: CPT | Performed by: PHYSICIAN ASSISTANT

## 2022-04-07 PROCEDURE — 1036F TOBACCO NON-USER: CPT | Performed by: PHYSICIAN ASSISTANT

## 2022-04-07 PROCEDURE — 99214 OFFICE O/P EST MOD 30 MIN: CPT | Performed by: PHYSICIAN ASSISTANT

## 2022-04-07 RX ORDER — PANTOPRAZOLE SODIUM 40 MG/1
40 TABLET, DELAYED RELEASE ORAL
Qty: 30 TABLET | Refills: 5 | Status: SHIPPED | OUTPATIENT
Start: 2022-04-07 | End: 2022-07-24 | Stop reason: SDUPTHER

## 2022-04-07 NOTE — PROGRESS NOTES
Assessment/Plan:    No problem-specific Assessment & Plan notes found for this encounter  Diagnoses and all orders for this visit:    Cough  -     Complete PFT with post bronchodilator; Future  -     CT chest wo contrast; Future    Hiatal hernia  -     pantoprazole (PROTONIX) 40 mg tablet; Take 1 tablet (40 mg total) by mouth daily before breakfast    AAA (abdominal aortic aneurysm) without rupture [facility])  -     Ambulatory Referral to Vascular Surgery; Future          GERD- small hiatal hernia likely the cause of chronic cough  With smoking history and chronic cough will also obtain PFT And Chest CT   Start protonix     Subjective:      Patient ID: Dexter Chaves is a 60 y o  male  Patient presents today for follow up on persistent chronic cough  He states the cough seemed to have improved on the steroids but at the same time he was taking antacids OTC for epigastric pain that ended up sending him to the ER     He does have a hx GERD  Has been on Prilosec, zantac, Nexium, pepcid in the past for GERD    ER visit- noted Left kidney stone  Believes he may have passed the stone recently likely Saturday night  Pain resolved  CT scan showing small hiatal hernia as well as a AAA 3 4 cm non ruptured that will need surveillance       The following portions of the patient's history were reviewed and updated as appropriate: allergies, current medications, past family history, past social history, past surgical history and problem list     Review of Systems   Constitutional: Negative for chills, fatigue and fever  HENT: Negative for congestion, ear pain, sinus pain, sore throat and trouble swallowing  Eyes: Negative for pain, discharge and redness  Respiratory: Positive for cough  Negative for chest tightness, shortness of breath and wheezing  Cardiovascular: Negative for chest pain, palpitations and leg swelling  Gastrointestinal: Negative for abdominal pain, diarrhea, nausea and vomiting  Musculoskeletal: Negative for arthralgias, joint swelling and myalgias  Skin: Negative for rash  Neurological: Negative for dizziness, weakness, numbness and headaches  Objective:      /76 (BP Location: Left arm, Patient Position: Sitting, Cuff Size: Large)   Pulse 65   Temp 98 4 °F (36 9 °C)   Ht 6' 3" (1 905 m)   Wt 115 kg (253 lb)   SpO2 97%   BMI 31 62 kg/m²          Physical Exam  Vitals and nursing note reviewed  Constitutional:       Appearance: He is well-developed  HENT:      Head: Normocephalic  Right Ear: Hearing and tympanic membrane normal       Left Ear: Hearing and tympanic membrane normal       Nose: No mucosal edema  Mouth/Throat:      Pharynx: Uvula midline  Posterior oropharyngeal erythema present  Cardiovascular:      Rate and Rhythm: Normal rate and regular rhythm  Pulmonary:      Effort: Pulmonary effort is normal       Breath sounds: Normal breath sounds  Xeljanz Counseling: I discussed with the patient the risks of Xeljanz therapy including increased risk of infection, liver issues, headache, diarrhea, or cold symptoms. Live vaccines should be avoided. They were instructed to call if they have any problems.

## 2022-04-07 NOTE — PROGRESS NOTES
BMI Counseling: Body mass index is 31 62 kg/m²  The BMI is above normal  Nutrition recommendations include reducing portion sizes and 3-5 servings of fruits/vegetables daily  Exercise recommendations include moderate aerobic physical activity for 150 minutes/week and exercising 3-5 times per week

## 2022-05-09 DIAGNOSIS — E78.2 MIXED HYPERLIPIDEMIA: ICD-10-CM

## 2022-05-09 DIAGNOSIS — F41.9 ANXIETY: ICD-10-CM

## 2022-05-09 RX ORDER — FLUOXETINE HYDROCHLORIDE 40 MG/1
CAPSULE ORAL
Qty: 90 CAPSULE | Refills: 3 | Status: SHIPPED | OUTPATIENT
Start: 2022-05-09

## 2022-05-09 RX ORDER — ROSUVASTATIN CALCIUM 5 MG/1
TABLET, COATED ORAL
Qty: 90 TABLET | Refills: 3 | Status: SHIPPED | OUTPATIENT
Start: 2022-05-09

## 2022-05-19 ENCOUNTER — HOSPITAL ENCOUNTER (OUTPATIENT)
Dept: CT IMAGING | Facility: HOSPITAL | Age: 57
Discharge: HOME | End: 2022-05-19
Payer: COMMERCIAL

## 2022-05-19 ENCOUNTER — HOSPITAL ENCOUNTER (OUTPATIENT)
Dept: PULMONOLOGY | Facility: HOSPITAL | Age: 57
Discharge: HOME | End: 2022-05-19
Payer: COMMERCIAL

## 2022-05-19 DIAGNOSIS — R05.9 COUGH: ICD-10-CM

## 2022-05-19 PROCEDURE — 94729 DIFFUSING CAPACITY: CPT

## 2022-05-19 PROCEDURE — 94060 EVALUATION OF WHEEZING: CPT | Performed by: INTERNAL MEDICINE

## 2022-05-19 PROCEDURE — 71250 CT THORAX DX C-: CPT

## 2022-05-19 PROCEDURE — 94727 GAS DIL/WSHOT DETER LNG VOL: CPT | Performed by: INTERNAL MEDICINE

## 2022-05-19 PROCEDURE — 94729 DIFFUSING CAPACITY: CPT | Performed by: INTERNAL MEDICINE

## 2022-05-19 PROCEDURE — 94727 GAS DIL/WSHOT DETER LNG VOL: CPT

## 2022-05-19 PROCEDURE — 94760 N-INVAS EAR/PLS OXIMETRY 1: CPT

## 2022-05-19 PROCEDURE — G1004 CDSM NDSC: HCPCS

## 2022-05-19 PROCEDURE — 94060 EVALUATION OF WHEEZING: CPT

## 2022-05-19 RX ORDER — ALBUTEROL SULFATE 2.5 MG/3ML
2.5 SOLUTION RESPIRATORY (INHALATION) ONCE
Status: COMPLETED | OUTPATIENT
Start: 2022-05-19 | End: 2022-05-19

## 2022-05-19 RX ADMIN — ALBUTEROL SULFATE 2.5 MG: 2.5 SOLUTION RESPIRATORY (INHALATION) at 07:56

## 2022-05-24 DIAGNOSIS — R93.89 ABNORMAL CT OF THE CHEST: ICD-10-CM

## 2022-05-24 DIAGNOSIS — R91.1 LUNG NODULE: Primary | ICD-10-CM

## 2022-06-14 ENCOUNTER — HOSPITAL ENCOUNTER (OUTPATIENT)
Dept: RADIOLOGY | Age: 57
Discharge: HOME | End: 2022-06-14
Payer: COMMERCIAL

## 2022-06-14 DIAGNOSIS — R93.89 ABNORMAL CT OF THE CHEST: ICD-10-CM

## 2022-06-14 DIAGNOSIS — R91.1 LUNG NODULE: ICD-10-CM

## 2022-06-14 LAB — GLUCOSE SERPL-MCNC: 97 MG/DL (ref 65–140)

## 2022-06-14 PROCEDURE — G1004 CDSM NDSC: HCPCS

## 2022-06-14 PROCEDURE — 78815 PET IMAGE W/CT SKULL-THIGH: CPT

## 2022-06-14 PROCEDURE — A9552 F18 FDG: HCPCS

## 2022-06-14 PROCEDURE — 82948 REAGENT STRIP/BLOOD GLUCOSE: CPT

## 2022-06-14 NOTE — LETTER
[address] [place] [address]  [address] [facility] [address]      June 17, 2022    MRN: [id number]     Phone: [phone]     Dear Mr Galina Lilly recently had a(n) Nuclear Medicine performed on 6/14/2022 at  [address] [place] [address] that was requested by Izzy العراقي PA-C  The study was reviewed by a radiologist, which is a physician who specializes in medical imaging  The radiologist issued a report describing his or her findings  In that report there was a finding that the radiologist felt warranted further discussion with your health care provider and that discussion would be beneficial to you  The results were sent to Izzy العراقي PA-C on 06/14/2022  9:27 AM  We recommend that you contact Izzy العراقي PA-C at [phone] or set up an appointment to discuss the results of the imaging test  If you have already heard from Izzy العراقي PA-C regarding the results of your study, you can disregard this letter  This letter is not meant to alarm you, but intended to encourage you to follow-up on your results with the provider that sent you for the imaging study  In addition, we have enclosed answers to frequently asked questions by other patients who have also received a letter to review results with their health care provider (see page two)  Thank you for choosing [address] [place] [address] for your medical imaging needs  FREQUENTLY ASKED QUESTIONS    1  Why am I receiving this letter? [address] [facility] requires us to notify patients who have findings on imaging exams that may require more testing or follow-up with a health professional within the next 3 months          2  How serious is the finding on the imaging test?  This letter is sent to all patients who may need follow-up or more testing within the next 3 months  Receiving this letter does not necessarily mean you have a life-threatening imaging finding or disease  Recommendations in the radiologists imaging report are general in nature and it is up to your healthcare provider to say whether those recommendations make sense for your situation  You are strongly encouraged to talk to your health care provider about the results and ask whether additional steps need to be taken  3  Where can I get a copy of the final report for my recent radiology exam?  To get a full copy of the report you can access your records online at [email or web address] or please contact [address] [place] [address] Records Department at [phone] Monday through Friday between 8 am and 6 pm          4  What do I need to do now? Please contact your health care provider who requested the imaging study to discuss what further actions (if any) are needed  You may have already heard from (your ordering provider) in regard to this test in which case you can disregard this letter  NOTICE IN ACCORDANCE WITH THE [facility] PATIENT TEST RESULT INFORMATION ACT OF 2018    You are receiving this notice as a result of a determination by your diagnostic imaging service that further discussions of your test results are warranted and would be beneficial to you  The complete results of your test or tests have been or will be sent to the health care practitioner that ordered the test or tests  It is recommended that you contact your health care practitioner to discuss your results as soon as possible

## 2022-06-15 DIAGNOSIS — R05.9 COUGH: ICD-10-CM

## 2022-06-15 DIAGNOSIS — R93.89 ABNORMAL CT OF THE CHEST: Primary | ICD-10-CM

## 2022-06-15 DIAGNOSIS — I71.40 ABDOMINAL AORTIC ANEURYSM (AAA) WITHOUT RUPTURE (HCC): ICD-10-CM

## 2022-06-15 DIAGNOSIS — R91.1 LUNG NODULE: ICD-10-CM

## 2022-06-15 NOTE — RESULT ENCOUNTER NOTE
Please notify patient the PET scan was reviewed and there are a few findings/recommendations  it is showing mostly an inflammatory vs infectious pathology in the lung which is consistent with the previous study  I did have one of the thoracic surgery specialist take a look at it as well and radiology recommending a repeat CT of the chest in 3 months (which I will place the order for)  If patient is still having symptoms he can absolutely follow up with pulmonology  Thoracic surgery is willing to see him as well if he has any further concerns or questions      On the PET there was also a abdominal aortic aneurysm noted that appears stable patient can follow with vascular for this for monitoring    Lastly there is a known left adrenal nodule that appears benign

## 2022-06-16 ENCOUNTER — TELEPHONE (OUTPATIENT)
Dept: FAMILY MEDICINE CLINIC | Facility: CLINIC | Age: 57
End: 2022-06-16

## 2022-06-16 ENCOUNTER — TELEPHONE (OUTPATIENT)
Dept: SURGICAL ONCOLOGY | Facility: CLINIC | Age: 57
End: 2022-06-16

## 2022-06-16 DIAGNOSIS — R93.89 ABNORMAL CT OF THE CHEST: Primary | ICD-10-CM

## 2022-06-16 NOTE — TELEPHONE ENCOUNTER
Thoracic SX Intake Form   Patient Details   Kannan De Jesus     1965     Reason For Appointment   Who is Calling? Spouse   If not patient, Name? Akhil Lopez   Who is the Referring Doctor? Yonatan Hastings   Which surgeon is Patient referred to? Dr Munira Aponte   What is the diagnosis? PET scan was inconclusive, 11mm lung nodule   Has this diagnosis been confirmed by    imaging/ biopsy/surgery? If yes, what is the date it was done? PET scan was inconclusive   Biopsy done at [place] [address]? If not, where was it done? N/A   Was imaging done, and was it done at [facility]? If not, where was it done? Yes on 6/14/22   Scheduling Information     What is the best call back number? [phone]   Miscellaneous Information   Patient had COVID in Dec, cough, SOB, never went away, tests all came back neg for COPD, but the lung nodule could possibly be malignant and want to discuss it more

## 2022-06-29 ENCOUNTER — TELEPHONE (OUTPATIENT)
Dept: PULMONOLOGY | Facility: CLINIC | Age: 57
End: 2022-06-29

## 2022-07-05 DIAGNOSIS — L20.89 OTHER ATOPIC DERMATITIS: ICD-10-CM

## 2022-07-05 DIAGNOSIS — L30.9 ECZEMA, UNSPECIFIED TYPE: ICD-10-CM

## 2022-07-05 RX ORDER — HYDROXYZINE HYDROCHLORIDE 25 MG/1
TABLET, FILM COATED ORAL
Qty: 90 TABLET | Refills: 3 | Status: SHIPPED | OUTPATIENT
Start: 2022-07-05

## 2022-07-05 RX ORDER — CRISABOROLE 20 MG/G
OINTMENT TOPICAL
Qty: 100 G | Refills: 0 | Status: SHIPPED | OUTPATIENT
Start: 2022-07-05

## 2022-07-06 ENCOUNTER — TELEPHONE (OUTPATIENT)
Dept: PULMONOLOGY | Facility: CLINIC | Age: 57
End: 2022-07-06

## 2022-07-06 ENCOUNTER — TELEPHONE (OUTPATIENT)
Dept: CARDIAC SURGERY | Facility: CLINIC | Age: 57
End: 2022-07-06

## 2022-07-06 NOTE — TELEPHONE ENCOUNTER
Spoke with pt and pt wife   Pt wife states he has an appt with a thoracis surgeon Dr Clayton Chandler tomorrow and no need for a pulmonary right now   Will call back if needed down the line

## 2022-07-06 NOTE — TELEPHONE ENCOUNTER
 Hello, can I please speak to (patient name) this is (enter your name here) calling from [facility]) to remind you of your appointment on (date and time) at (location)  I am calling to review our no-show/cancelation policy and masking policy  Do you have a few minutes? We ask that you come at least 15 minutes early for your appointment to complete all paperwork  However, If you are up to 20 minutes late for your appointment, we may need to reschedule you  Any lateness to an appointment may result in an shorted visit, for our providers to offer you the most out of your consult, please arrive early  We require at least 24-hour notice for cancelations and if you miss your appointment 3 times, we may unfortunately not be able to reschedule any future visits  We ask that you please call our office in the event you are feeling ill as we may need to reschedule your appointment  You are allowed to bring only one visitor with you to your appointment, if your visitor is not feeling well we ask that you don't bring them  Our current masking policy is: if you are vaccinated masking is optional, if you are unvaccinated masking is required  We look forward to seeing you at your upcoming appointment!

## 2022-07-07 ENCOUNTER — OFFICE VISIT (OUTPATIENT)
Dept: CARDIAC SURGERY | Facility: CLINIC | Age: 57
End: 2022-07-07
Payer: COMMERCIAL

## 2022-07-07 ENCOUNTER — TELEPHONE (OUTPATIENT)
Dept: FAMILY MEDICINE CLINIC | Facility: CLINIC | Age: 57
End: 2022-07-07

## 2022-07-07 ENCOUNTER — DOCUMENTATION (OUTPATIENT)
Dept: CARDIAC SURGERY | Facility: CLINIC | Age: 57
End: 2022-07-07

## 2022-07-07 VITALS
HEART RATE: 87 BPM | DIASTOLIC BLOOD PRESSURE: 78 MMHG | WEIGHT: 260.14 LBS | OXYGEN SATURATION: 94 % | SYSTOLIC BLOOD PRESSURE: 126 MMHG | RESPIRATION RATE: 18 BRPM | BODY MASS INDEX: 32.35 KG/M2 | TEMPERATURE: 98.2 F | HEIGHT: 75 IN

## 2022-07-07 DIAGNOSIS — L30.9 ECZEMA, UNSPECIFIED TYPE: Primary | ICD-10-CM

## 2022-07-07 DIAGNOSIS — R91.1 PULMONARY NODULE: Primary | ICD-10-CM

## 2022-07-07 DIAGNOSIS — R93.89 ABNORMAL CT OF THE CHEST: ICD-10-CM

## 2022-07-07 PROCEDURE — 99205 OFFICE O/P NEW HI 60 MIN: CPT | Performed by: THORACIC SURGERY (CARDIOTHORACIC VASCULAR SURGERY)

## 2022-07-07 RX ORDER — FLUOCINONIDE 0.5 MG/G
OINTMENT TOPICAL 2 TIMES DAILY
Qty: 30 G | Refills: 3 | Status: SHIPPED | OUTPATIENT
Start: 2022-07-07

## 2022-07-07 NOTE — PROGRESS NOTES
Thoracic Consult  Assessment/Plan:    Pulmonary nodule  Mr Melissa Lawson presents for evaluation of a left lower lobe pulmonary nodule  He had COVID in December and has had a lingering dry cough  CT images were reviewed as were the PET-CT images  He has a nodularity in the left lower lobe with surrounding ground glass which has mild FDG uptake  This could represent an inflammatory lesion given these findings  If it represents malignancy, it would be very slow growing given the SUV  He does have a significant former smoking history so close follow up is warranted  He will return with a repeat CT scan in 3 months  Other options included needle biopsy versus wedge resection which patient would like to forego at this time  Needle biopsy may be challenging given proximity to diaphragm  Diagnoses and all orders for this visit:    Pulmonary nodule  -     CT chest wo contrast; Future    Abnormal CT of the chest  -     Ambulatory Referral to Thoracic Surgery          Thoracic History   Diagnosis: Left lower lobe pulmonary nodule   Procedures/Surgeries:    Pathology:    Adjuvant Therapy:       Subjective:    Patient ID: Jackie Fox is a 60 y o  male  HPI   Mr Meraz is a 60year old man referred to our office for a pulmonary nodule  Chest CT 5/19/22 demonstrated a right upper lobe tree-in-bud nodularity  There is a subpleural nodular area in the left lower lobe measuring 11mm  There are no enlarged lymph nodes  PET-CT 6/14/22 reveals stability of the left lower lobe nodular density with more distal patchy areas SUV 2 4  There is a left adrenal nodule with SUV 2 4  PFTs 5/19/22 shows FEV1 93% and DLCO 71%  On discussion, he has no significant past thoracic surgical history  He has HLD, GERD and anxiety  He is a former smoker, quit 2007, 1 5ppd x 38 years  He had COVID in December, and a persistent cough ever since  Non productive, or hemoptysis   He has shortness of breath on exertion which is slightly worse since COVID but not new  No chest pain  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, 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  No family history of lung cancer       The following portions of the patient's history were reviewed and updated as appropriate: allergies, current medications, past family history, past medical history, past social history, past surgical history and problem list     Past Medical History:   Diagnosis Date    Anxiety     Eczema     Gastric ulcer     Hypercholesterolemia     Impacted cerumen of left ear 2/27/2019    Sleep apnea       Past Surgical History:   Procedure Laterality Date    CYST REMOVAL      EGD      EXAMINATION UNDER ANESTHESIA N/A 9/24/2020    Procedure: DRUG INDUCED SLEEP ENDOSCOPY;  Surgeon: Lit Kapadia MD;  Location: [facility];  Service: ENT    LASIK        Family History   Problem Relation Age of Onset    Cancer Mother         ovarian and stomach    Coronary artery disease Mother     Coronary artery disease Father     Cancer Father         pancratic      Social History     Socioeconomic History    Marital status: /Civil Union     Spouse name: Not on file    Number of children: Not on file    Years of education: Not on file    Highest education level: Not on file   Occupational History    Not on file   Tobacco Use    Smoking status: Former Smoker     Quit date: 2007     Years since quitting: 15 5    Smokeless tobacco: Never Used   Vaping Use    Vaping Use: Never used   Substance and Sexual Activity    Alcohol use: Yes     Comment: socially    Drug use: Never    Sexual activity: Yes     Partners: Female   Other Topics Concern    Not on file   Social History Narrative    Not on file     Social Determinants of Health     Financial Resource Strain: Not on file   Food Insecurity: Not on file   Transportation Needs: Not on file   Physical Activity: Not on file   Stress: Not on file   Social Connections: Not on file   Intimate Partner Violence: Not on file   Housing Stability: Not on file      Current Outpatient Medications   Medication Instructions    Crisaborole Verla Hemp) 2 % OINT Apply topically twice a day Monday to Friday to trunk and extremities    fluocinonide (LIDEX) 0 05 % ointment Topical, 2 times daily    FLUoxetine (PROzac) 40 MG capsule TAKE 1 CAPSULE DAILY AT BEDTIME    hydrOXYzine HCL (ATARAX) 25 mg tablet TAKE 1 TABLET DAILY AT BEDTIME    multivitamin (THERAGRAN) TABS 1 tablet, Oral, Daily    pantoprazole (PROTONIX) 40 mg, Oral, Daily before breakfast    Promethazine-DM (PHENERGAN-DM) 6 25-15 mg/5 mL oral syrup 5 mL, Oral, Daily at bedtime PRN    rosuvastatin (CRESTOR) 5 mg tablet TAKE 1 TABLET DAILY AT BEDTIME     Allergies   Allergen Reactions    Shellfish-Derived Products - Food Allergy Anaphylaxis, Dizziness and Lightheadedness    Medical Tape Itching and Rash       Review of Systems   Constitutional: Negative  Eyes: Negative  Respiratory: Positive for cough and shortness of breath  Cardiovascular: Negative  Gastrointestinal: Negative  Musculoskeletal: Negative  Skin: Negative  Neurological: Negative  Hematological: Negative  Psychiatric/Behavioral: Negative  Objective:   Physical Exam  Constitutional:       General: He is not in acute distress  HENT:      Head: Normocephalic and atraumatic  Eyes:      General: No scleral icterus  Conjunctiva/sclera: Conjunctivae normal    Cardiovascular:      Rate and Rhythm: Normal rate and regular rhythm  Pulmonary:      Effort: Pulmonary effort is normal  No respiratory distress  Breath sounds: Normal breath sounds  Abdominal:      General: There is no distension  Palpations: Abdomen is soft  Musculoskeletal:      Cervical back: Neck supple  Lymphadenopathy:      Cervical: No cervical adenopathy  Skin:     General: Skin is warm and dry  Neurological:      General: No focal deficit present        Mental Status: He is alert and oriented to person, place, and time  Psychiatric:         Mood and Affect: Mood normal      /78   Pulse 87   Temp 98 2 °F (36 8 °C)   Resp 18   Ht 6' 3" (1 905 m)   Wt 118 kg (260 lb 2 3 oz)   SpO2 94%   BMI 32 52 kg/m²     XR chest pa & lateral    Result Date: 3/23/2022  Impression No acute cardiopulmonary disease  Findings are stable Workstation performed: [id number]     XR chest 2 views    Result Date: 12/14/2021  Impression No acute cardiopulmonary findings  Workstation performed: [id number]      CT chest wo contrast    Result Date: 5/23/2022  Narrative CT CHEST WITHOUT IV CONTRAST INDICATION:   R05 9: Cough, unspecified  COMPARISON:  Chest x-ray 3/22/2022 and CT abdomen pelvis 3/29/2022 TECHNIQUE: CT examination of the chest was performed without intravenous contrast  This examination was performed without intravenous contrast in the context of the critical nationwide Omnipaque shortage  Axial, sagittal, and coronal 2D reformatted images were created from the source data and submitted for interpretation  Radiation dose length product (DLP) for this visit:  309 mGy-cm   This examination, like all CT scans performed in the [facility], was performed utilizing techniques to minimize radiation dose exposure, including the use of iterative reconstruction and automated exposure control  FINDINGS: LUNGS:  There is a geographic area of mild patchy tree-in-bud micronodularity in the peripheral right upper lobe as can be seen for example on image 3/31  There is subpleural nodular density posterior left lower lobe which sample area measuring approximately 11 mm on image 3/111  There are a few additional areas of patchy faint nodularity in the left upper lobe seen on images 42, 52 and 61  PLEURA:  Unremarkable  HEART/GREAT VESSELS: Heart is unremarkable for patient's age  No thoracic aortic aneurysm  MEDIASTINUM AND JAYNA:  Unremarkable  CHEST WALL AND LOWER NECK:  Unremarkable   VISUALIZED STRUCTURES IN THE UPPER ABDOMEN:  Left adrenal myelolipoma is partially imaged  OSSEOUS STRUCTURES:  No acute fracture or destructive osseous lesion  Impression 1  Subpleural nodular density in the posterior left lower lobe with sample area measuring approximately 11 mm  This reflects a change from prior  Based on current Fleischner Society 2017 Guidelines on incidental pulmonary nodule, either PET/CT scan evaluation, tissue sampling or short term interval followup non-contrast CT followup (initially in 3 months) may be considered appropriate  2  Patchy tree-in-bud micronodular the in the right upper lobe is likely infectious or inflammatory  There are also a couple tiny patchy areas of nodularity in the left upper lobe  These can be reassessed when follow-up is performed for impression #1  The study was marked in EPIC for significant notification  Workstation performed: [id number]     NM PET CT skull base to mid thigh    Result Date: 6/14/2022  Narrative PET/CT SCAN INDICATION:  R91 1: Solitary pulmonary nodule R93 89: Abnormal findings on diagnostic imaging of other specified body structures   , lung nodule evaluation The following clinical information was obtained directly from EPIC: hx abnormal CT - subpleural nodular density in posterior LLL, patchy tree-in-bud micronodular in RUL  Evaluate for malignancy  MODIFIER: PI COMPARISON: CT of chest dated 5/19/2022 and CT of abdomen and pelvis dated 3/29/2022 CELL TYPE:  Not applicable TECHNIQUE:   10 2 mCi F-18-FDG administered IV  Multiplanar attenuation corrected and non attenuation corrected PET images were acquired 60 minutes post injection  Contiguous, low dose, axial CT sections were obtained from the skull base through the femurs   Intravenous contrast material was not utilized    This examination, like all CT scans performed in the [facility], was performed utilizing techniques to minimize radiation dose exposure, including the use of iterative reconstruction and automated exposure control  Fasting serum glucose: 97 mg/dl FINDINGS: VISUALIZED BRAIN:   No acute abnormalities are seen  HEAD/NECK:   There is a physiologic distribution of FDG  No FDG avid cervical adenopathy is seen  CT images: Intracranial atherosclerotic calcification is noted  CHEST:   Patient's known 11 mm left lower lobe nodular density is essentially stable in size on image 138 of series 3 although it appears slightly more semisolid in nature  Again noted is additional more distal patchy and somewhat nodular airspace disease extending from images 139 through 141 of series 3  There is minimal patchy FDG activity with max SUV of 2 4 which has a more nonsolid inflammatory appearance with atypical presentation of malignancy not excluded  For reference, max SUV of the mediastinal blood pool is 2 2  Patient's known additional lung nodules seen on recent dedicated CT of chest dated 5/19/2022 are poorly visualized on current low-dose unenhanced CT and are beyond the limits of resolution for PET CT  CT images: Trace bilateral gynecomastia  Atherosclerotic vascular calcifications including those of the coronary arteries are noted  ABDOMEN:   On image 169 of series 3, patient's known 2 7 cm left adrenal nodule exhibits minimal FDG activity with max SUV of 2 4  This nodule appears to contain a tiny focus of macroscopic fat suggesting benign nodule although findings can be further characterized with dedicated MRI of the abdomen as clinically indicated  CT images: 3 8 cm infrarenal abdominal aortic aneurysm  Atherosclerotic vascular calcifications are noted  PELVIS: No FDG avid soft tissue lesions are seen  CT images: Unremarkable  OSSEOUS STRUCTURES: No FDG avid lesions are seen  CT images: Degenerative changes are noted involving the spine  Impression 1    Minimal patchy FDG activity involving the dependent left lower lobe in region of patient's known 11 mm nodular density which appears somewhat more semisolid on the prior exam and is associated with more peripheral patchy and nodular airspace disease  The constellation of findings is most suggestive of an inflammatory process with atypical presentation of malignancy not excluded  At minimum, short interval follow-up with CT of chest in 3 months is recommended to ensure stability and exclude developing malignancy of low metabolic activity  2   Minimal FDG activity associated with patient's known 2 7 cm left adrenal nodule which likely contains tiny focus of microscopic fat suggesting benign nodule  Findings can be further characterized with dedicated MRI of the abdomen as clinically indicated  3   3 8 cm infrarenal abdominal aortic aneurysm  The study was marked in EPIC for significant notification  The study was marked in Epic for follow-up   Workstation performed: [id number]

## 2022-07-07 NOTE — PROGRESS NOTES
I met with Yohannesherminio Ayah and his son at his visit with Dr Jeannette Gonzalez  I introduced myself and explained my role to them  Questions answered, support provided  Dara Soulier

## 2022-07-07 NOTE — ASSESSMENT & PLAN NOTE
Mr Leandro Jiménez presents for evaluation of a left lower lobe pulmonary nodule  He had COVID in December and has had a lingering dry cough  CT images were reviewed as were the PET-CT images  He has a nodularity in the left lower lobe with surrounding ground glass which has mild FDG uptake  This could represent an inflammatory lesion given these findings  If it represents malignancy, it would be very slow growing given the SUV  He does have a significant former smoking history so close follow up is warranted  He will return with a repeat CT scan in 3 months  Other options included needle biopsy versus wedge resection which patient would like to forego at this time  Needle biopsy may be challenging given proximity to diaphragm

## 2022-07-21 ENCOUNTER — CONSULT (OUTPATIENT)
Dept: VASCULAR SURGERY | Facility: CLINIC | Age: 57
End: 2022-07-21
Payer: COMMERCIAL

## 2022-07-21 VITALS
HEIGHT: 75 IN | WEIGHT: 262 LBS | HEART RATE: 84 BPM | BODY MASS INDEX: 32.58 KG/M2 | DIASTOLIC BLOOD PRESSURE: 72 MMHG | SYSTOLIC BLOOD PRESSURE: 142 MMHG

## 2022-07-21 DIAGNOSIS — I71.40 AAA (ABDOMINAL AORTIC ANEURYSM) WITHOUT RUPTURE: ICD-10-CM

## 2022-07-21 DIAGNOSIS — I71.40 ABDOMINAL AORTIC ANEURYSM (AAA) WITHOUT RUPTURE: ICD-10-CM

## 2022-07-21 PROBLEM — I71.4 AAA (ABDOMINAL AORTIC ANEURYSM) (HCC): Status: ACTIVE | Noted: 2022-07-21

## 2022-07-21 PROCEDURE — 99203 OFFICE O/P NEW LOW 30 MIN: CPT | Performed by: SURGERY

## 2022-07-21 NOTE — ASSESSMENT & PLAN NOTE
Presents with CT scan back in March of this year which was performed for left lower quadrant pain and a kidney stone was discovered  The study also revealed a 3 4 centimeter infrarenal AAA which is asymptomatic  There is no family history of AAA but I have asked his 1st degree relatives to be screened for AAA as there is a 20% genetic predisposition      Plan:  Follow-up AAA duplex in 1 year

## 2022-07-21 NOTE — LETTER
July 21, 2022     Silas Starr, [address]    Patient: Ramandeep Crane   YOB: 1965   Date of Visit: 7/21/2022       Dear Dr Gonzalez Brar:    Thank you for referring Soraida Norton to me for evaluation  Below are my notes for this consultation  If you have questions, please do not hesitate to call me  I look forward to following your patient along with you           Sincerely,        Kourtney Lopez MD        CC: Rick Plummer PA-C

## 2022-07-21 NOTE — PROGRESS NOTES
Assessment/Plan:    Presents with CT scan back in March of this year which was performed for left lower quadrant pain and a kidney stone was discovered  The study also revealed a 3 4 centimeter infrarenal AAA which is asymptomatic  There is no family history of AAA but I have asked his 1st degree relatives to be screened for AAA as there is a 20% genetic predisposition  Plan:  Follow-up AAA duplex in 1 year   Diagnoses and all orders for this visit:    AAA (abdominal aortic aneurysm) without rupture ([address] )  -     Ambulatory Referral to Vascular Surgery    Abdominal aortic aneurysm (AAA) without rupture ([facility] [address] )  -     Ambulatory Referral to Vascular Surgery  -     VAS abdominal aorta/iliacs; complete study; Future        Subjective:      Patient ID: Yamini Diop is a 60 y o  male  Pt is new and here to rev CT of abd/ pelvis done on 3/29/22 for AAA  Pt denies abd/ back pain  Pt is taking rosuvastatin  Pt is a former smoker  HPI    The following portions of the patient's history were reviewed and updated as appropriate: allergies, current medications, past family history, past medical history, past social history, past surgical history and problem list     Review of Systems   Constitutional: Negative  HENT: Negative  Eyes: Negative  Respiratory: Negative  Cardiovascular: Negative  Gastrointestinal: Negative  Endocrine: Negative  Genitourinary: Negative  Musculoskeletal: Negative  Skin: Negative  Allergic/Immunologic: Negative  Neurological: Negative  Hematological: Negative  Psychiatric/Behavioral: Negative  Objective: There were no vitals taken for this visit  Physical Exam      Oriented x3 no evidence of clinical depression  Eyes:  Sclera non-icteric    Skin: normal without evidence of inflammation    Neck is supple carotid pulses equal bilaterally no bruits heard    Chest lungs clear, heart regular rhythm      Abdomen soft nontender no evidence of pulsatile masses  Pulses are palpable bilateral Femoral  Popliteal, DP     Neurological exam intact cranial nerves 2-12 grossly intact no gross motor sensory deficits detected  Imaging viewed and reviewed with Patient    I have reviewed and made appropriate changes to the review of systems input by the medical assistant      Vitals:    07/21/22 1523   BP: 142/72   BP Location: Right arm   Patient Position: Sitting   Cuff Size: Standard   Pulse: 84   Weight: 119 kg (262 lb)   Height: 6' 3" (1 905 m)       Patient Active Problem List   Diagnosis    Eczema    Anxiety    Mixed hyperlipidemia    Severe obstructive sleep apnea    Health maintenance examination    COVID-19 virus infection    Cough    Pulmonary nodule    AAA (abdominal aortic aneurysm) (HCC)       Past Surgical History:   Procedure Laterality Date    CYST REMOVAL      EGD      EXAMINATION UNDER ANESTHESIA N/A 9/24/2020    Procedure: DRUG INDUCED SLEEP ENDOSCOPY;  Surgeon: Laney Steiner MD;  [facility]: [facility];  Service: ENT    LASIK         Family History   Problem Relation Age of Onset    Cancer Mother         ovarian and stomach    Coronary artery disease Mother     Coronary artery disease Father     Cancer Father         pancratic       Social History     Socioeconomic History    Marital status: /Civil Union     Spouse name: Not on file    Number of children: Not on file    Years of education: Not on file    Highest education level: Not on file   Occupational History    Not on file   Tobacco Use    Smoking status: Former Smoker     Quit date: 2007     Years since quitting: 15 5    Smokeless tobacco: Never Used   Vaping Use    Vaping Use: Never used   Substance and Sexual Activity    Alcohol use: Yes     Comment: socially    Drug use: Never    Sexual activity: Yes     Partners: Female   Other Topics Concern    Not on file   Social History Narrative    Not on file     Social Determinants of Health     Financial Resource Strain: Not on file   Food Insecurity: Not on file   Transportation Needs: Not on file   Physical Activity: Not on file   Stress: Not on file   Social Connections: Not on file   Intimate Partner Violence: Not on file   Housing Stability: Not on file       Allergies   Allergen Reactions    Shellfish-Derived Products - Food Allergy Anaphylaxis, Dizziness and Lightheadedness    Medical Tape Itching and Rash         Current Outpatient Medications:     Crisaborole (Eucrisa) 2 % OINT, Apply topically twice a day Monday to Friday to trunk and extremities, Disp: 100 g, Rfl: 0    FLUoxetine (PROzac) 40 MG capsule, TAKE 1 CAPSULE DAILY AT BEDTIME, Disp: 90 capsule, Rfl: 3    hydrOXYzine HCL (ATARAX) 25 mg tablet, TAKE 1 TABLET DAILY AT BEDTIME, Disp: 90 tablet, Rfl: 3    multivitamin (THERAGRAN) TABS, Take 1 tablet by mouth daily, Disp: , Rfl:     pantoprazole (PROTONIX) 40 mg tablet, Take 1 tablet (40 mg total) by mouth daily before breakfast, Disp: 30 tablet, Rfl: 5    rosuvastatin (CRESTOR) 5 mg tablet, TAKE 1 TABLET DAILY AT BEDTIME, Disp: 90 tablet, Rfl: 3    fluocinonide (LIDEX) 0 05 % ointment, Apply topically 2 (two) times a day (Patient not taking: Reported on 7/21/2022), Disp: 30 g, Rfl: 3    Promethazine-DM (PHENERGAN-DM) 6 25-15 mg/5 mL oral syrup, Take 5 mL by mouth daily at bedtime as needed for cough, Disp: 118 mL, Rfl: 0

## 2022-07-24 DIAGNOSIS — K44.9 HIATAL HERNIA: ICD-10-CM

## 2022-07-25 RX ORDER — PANTOPRAZOLE SODIUM 40 MG/1
40 TABLET, DELAYED RELEASE ORAL
Qty: 30 TABLET | Refills: 0 | Status: SHIPPED | OUTPATIENT
Start: 2022-07-25 | End: 2022-09-20 | Stop reason: SDUPTHER

## 2022-09-12 ENCOUNTER — HOSPITAL ENCOUNTER (OUTPATIENT)
Dept: CT IMAGING | Facility: HOSPITAL | Age: 57
Discharge: HOME | End: 2022-09-12
Payer: COMMERCIAL

## 2022-09-12 DIAGNOSIS — R91.1 PULMONARY NODULE: ICD-10-CM

## 2022-09-12 PROCEDURE — 71250 CT THORAX DX C-: CPT

## 2022-09-12 PROCEDURE — G1004 CDSM NDSC: HCPCS

## 2022-09-15 ENCOUNTER — TELEPHONE (OUTPATIENT)
Dept: GYNECOLOGIC ONCOLOGY | Facility: CLINIC | Age: 57
End: 2022-09-15

## 2022-09-20 ENCOUNTER — OFFICE VISIT (OUTPATIENT)
Dept: CARDIAC SURGERY | Facility: CLINIC | Age: 57
End: 2022-09-20
Payer: COMMERCIAL

## 2022-09-20 VITALS
HEART RATE: 74 BPM | DIASTOLIC BLOOD PRESSURE: 74 MMHG | BODY MASS INDEX: 32.43 KG/M2 | WEIGHT: 260.8 LBS | HEIGHT: 75 IN | RESPIRATION RATE: 18 BRPM | OXYGEN SATURATION: 96 % | SYSTOLIC BLOOD PRESSURE: 112 MMHG | TEMPERATURE: 98.6 F

## 2022-09-20 DIAGNOSIS — K44.9 HIATAL HERNIA: ICD-10-CM

## 2022-09-20 DIAGNOSIS — R93.89 ABNORMAL CT OF THE CHEST: Primary | ICD-10-CM

## 2022-09-20 DIAGNOSIS — R91.1 PULMONARY NODULE: ICD-10-CM

## 2022-09-20 PROCEDURE — 99213 OFFICE O/P EST LOW 20 MIN: CPT | Performed by: THORACIC SURGERY (CARDIOTHORACIC VASCULAR SURGERY)

## 2022-09-20 RX ORDER — PANTOPRAZOLE SODIUM 40 MG/1
40 TABLET, DELAYED RELEASE ORAL
Qty: 30 TABLET | Refills: 3 | Status: SHIPPED | OUTPATIENT
Start: 2022-09-20 | End: 2022-09-22 | Stop reason: SDUPTHER

## 2022-09-20 NOTE — PROGRESS NOTES
Assessment/Plan:    Pulmonary nodule  Mr eBba Garcia is a very pleasant 41-year-old male who presents to my office for follow-up regarding a pulmonary nodule  I have personally reviewed his imaging in PACS  This demonstrates a 1 1 cm left lower lobe pulmonary nodule  This has demonstrated stability for the past 6 months  Today in the office, we had a long conversation regarding his CT scan  I explained to him that this nodule has demonstrated stability for the past 6 months  I believe that it is safe to continue to just watch this pulmonary nodule at this time  I did answer all of his questions as well as his wife's questions and I do believe that they are satisfied with this plan  Ordered CT scan of the chest at this time and he will follow up with me in 6 months  I did explain to him that if this demonstrates any kind of growth over the next 6 months, then I would likely recommend a VATS wedge resection at that time  Seferino Spurling, MD  Thoracic Surgery  (Available on [employer])  Office: [phone]         Diagnoses and all orders for this visit:    Abnormal CT of the chest    Pulmonary nodule  -     CT chest wo contrast; Future    Hiatal hernia  -     pantoprazole (PROTONIX) 40 mg tablet; Take 1 tablet (40 mg total) by mouth daily before breakfast          Thoracic History       Subjective:    Patient ID: Will Cardoza is a 60 y o  male  HPI  Mr Beba Garcia is a very pleasant 41-year-old male who presents to my office for follow-up regarding a pulmonary nodule  I have personally reviewed his imaging in PACS  This demonstrates a 1 1 cm left lower lobe pulmonary nodule  This has demonstrated stability for the past 6 months  Today in the office, he is doing well  He denies any significant changes in his health  He does report that he his cough had improved after he had started taking anti-reflux fundoplication    He recently ran out of the anti-reflux medication and his cough started back up   I have renewed this medication today in the office  He reports his shortness of breath is stable  He does report that he gained 30 lb recently but he had previously lost 30 lb when he had COVID last December  The following portions of the patient's history were reviewed and updated as appropriate: allergies, current medications, past family history, past medical history, past social history, past surgical history and problem list     Review of Systems   Constitutional: Negative  Negative for activity change, chills, fatigue, fever and unexpected weight change  HENT: Negative for sore throat, trouble swallowing and voice change  Eyes: Negative  Negative for visual disturbance  Respiratory: Positive for cough  Negative for chest tightness, shortness of breath, wheezing and stridor  Cardiovascular: Negative for chest pain and leg swelling  Gastrointestinal: Negative  Endocrine: Negative  Genitourinary: Negative  Musculoskeletal: Negative  Skin: Negative  Allergic/Immunologic: Negative  Neurological: Negative for seizures, syncope, speech difficulty, weakness, light-headedness and headaches  Hematological: Negative for adenopathy  Psychiatric/Behavioral: Negative  Objective:   Physical Exam  Vitals and nursing note reviewed  Constitutional:       Appearance: Normal appearance  He is well-developed and normal weight  He is not diaphoretic  HENT:      Head: Normocephalic and atraumatic  Nose: Nose normal  No congestion  Mouth/Throat:      Mouth: Mucous membranes are moist       Pharynx: Oropharynx is clear  Eyes:      Extraocular Movements: Extraocular movements intact  Pupils: Pupils are equal, round, and reactive to light  Neck:      Trachea: No tracheal deviation  Cardiovascular:      Rate and Rhythm: Normal rate and regular rhythm  Heart sounds: Normal heart sounds  No murmur heard    Pulmonary:      Effort: Pulmonary effort is normal  No respiratory distress  Breath sounds: Normal breath sounds  No stridor  No wheezing or rales  Chest:      Chest wall: No tenderness  Abdominal:      General: Bowel sounds are normal  There is no distension  Palpations: Abdomen is soft  Tenderness: There is no abdominal tenderness  Musculoskeletal:         General: Normal range of motion  Cervical back: Normal range of motion  Lymphadenopathy:      Cervical: No cervical adenopathy  Skin:     General: Skin is warm and dry  Coloration: Skin is not pale  Findings: No erythema or rash  Neurological:      Mental Status: He is alert and oriented to person, place, and time  Psychiatric:         Behavior: Behavior normal          Thought Content: Thought content normal          Judgment: Judgment normal      /74 (BP Location: Right arm, Patient Position: Sitting, Cuff Size: Large)   Pulse 74   Temp 98 6 °F (37 °C) (Temporal)   Resp 18   Ht 6' 3" (1 905 m)   Wt 118 kg (260 lb 12 8 oz)   SpO2 96%   BMI 32 60 kg/m²     XR chest pa & lateral    Result Date: 3/23/2022  Impression No acute cardiopulmonary disease  Findings are stable Workstation performed: [id number]     XR chest 2 views    Result Date: 12/14/2021  Impression No acute cardiopulmonary findings  Workstation performed: [id number]      CT chest wo contrast    Result Date: 9/15/2022  Narrative CT CHEST WITHOUT IV CONTRAST INDICATION:   R91 1: Solitary pulmonary nodule  COMPARISON:  5/19/2022 TECHNIQUE: CT examination of the chest was performed without intravenous contrast  Axial, sagittal, and coronal 2D reformatted images were created from the source data and submitted for interpretation  Radiation dose length product (DLP) for this visit:  298 mGy-cm     This examination, like all CT scans performed in the [facility], was performed utilizing techniques to minimize radiation dose exposure, including the use of iterative reconstruction and automated exposure control  FINDINGS: LUNGS:  The previously described 11 mm nodule at the left lower lobe persists (3/107)  There is no significant interval change in size  When compared to the CT scan from March 29, 2022, the 6 mm nodular component appears to be stable but there is increasing associated opacity at the posterior costophrenic angle which has the appearance of atelectasis or an inflammatory component  Short-term interval follow-up may be reasonable  Otherwise, previous tree-in-bud opacity in the right upper lobe is unchanged as well as scattered nonspecific opacities in the left upper lobe likely representing areas of scarring  There is no tracheal or endobronchial lesion  PLEURA:  Unremarkable  HEART/GREAT VESSELS: There are coronary artery calcifications  No thoracic aortic aneurysm  MEDIASTINUM AND JAYNA:  Unremarkable  CHEST WALL AND LOWER NECK:  Unremarkable  VISUALIZED STRUCTURES IN THE UPPER ABDOMEN:  Stable left adrenal nodule  OSSEOUS STRUCTURES:  No acute fracture or destructive osseous lesion  Impression Previously described 11 mm left lower lobe nodule is not significantly changed  Consider continued short-term follow-up in 3 months  The study was marked in EPIC for significant notification  Workstation performed: [id number]     CT chest wo contrast    Result Date: 5/23/2022  Narrative CT CHEST WITHOUT IV CONTRAST INDICATION:   R05 9: Cough, unspecified  COMPARISON:  Chest x-ray 3/22/2022 and CT abdomen pelvis 3/29/2022 TECHNIQUE: CT examination of the chest was performed without intravenous contrast  This examination was performed without intravenous contrast in the context of the critical nationwide Omnipaque shortage  Axial, sagittal, and coronal 2D reformatted images were created from the source data and submitted for interpretation  Radiation dose length product (DLP) for this visit:  309 mGy-cm     This examination, like all CT scans performed in the [facility], was performed utilizing techniques to minimize radiation dose exposure, including the use of iterative reconstruction and automated exposure control  FINDINGS: LUNGS:  There is a geographic area of mild patchy tree-in-bud micronodularity in the peripheral right upper lobe as can be seen for example on image 3/31  There is subpleural nodular density posterior left lower lobe which sample area measuring approximately 11 mm on image 3/111  There are a few additional areas of patchy faint nodularity in the left upper lobe seen on images 42, 52 and 61  PLEURA:  Unremarkable  HEART/GREAT VESSELS: Heart is unremarkable for patient's age  No thoracic aortic aneurysm  MEDIASTINUM AND JAYNA:  Unremarkable  CHEST WALL AND LOWER NECK:  Unremarkable  VISUALIZED STRUCTURES IN THE UPPER ABDOMEN:  Left adrenal myelolipoma is partially imaged  OSSEOUS STRUCTURES:  No acute fracture or destructive osseous lesion  Impression 1  Subpleural nodular density in the posterior left lower lobe with sample area measuring approximately 11 mm  This reflects a change from prior  Based on current Fleischner Society 2017 Guidelines on incidental pulmonary nodule, either PET/CT scan evaluation, tissue sampling or short term interval followup non-contrast CT followup (initially in 3 months) may be considered appropriate  2  Patchy tree-in-bud micronodular the in the right upper lobe is likely infectious or inflammatory  There are also a couple tiny patchy areas of nodularity in the left upper lobe  These can be reassessed when follow-up is performed for impression #1  The study was marked in EPIC for significant notification  Workstation performed: [id number]     No CT Chest,Abdomen,Pelvis results available for this patient     NM PET CT skull base to mid thigh    Result Date: 6/14/2022  Narrative PET/CT SCAN INDICATION:  R91 1: Solitary pulmonary nodule R93 89: Abnormal findings on diagnostic imaging of other specified body structures , lung nodule evaluation The following clinical information was obtained directly from EPIC: hx abnormal CT - subpleural nodular density in posterior LLL, patchy tree-in-bud micronodular in RUL  Evaluate for malignancy  MODIFIER: PI COMPARISON: CT of chest dated 5/19/2022 and CT of abdomen and pelvis dated 3/29/2022 CELL TYPE:  Not applicable TECHNIQUE:   10 2 mCi F-18-FDG administered IV  Multiplanar attenuation corrected and non attenuation corrected PET images were acquired 60 minutes post injection  Contiguous, low dose, axial CT sections were obtained from the skull base through the femurs   Intravenous contrast material was not utilized  This examination, like all CT scans performed in the [facility], was performed utilizing techniques to minimize radiation dose exposure, including the use of iterative reconstruction and automated exposure control  Fasting serum glucose: 97 mg/dl FINDINGS: VISUALIZED BRAIN:   No acute abnormalities are seen  HEAD/NECK:   There is a physiologic distribution of FDG  No FDG avid cervical adenopathy is seen  CT images: Intracranial atherosclerotic calcification is noted  CHEST:   Patient's known 11 mm left lower lobe nodular density is essentially stable in size on image 138 of series 3 although it appears slightly more semisolid in nature  Again noted is additional more distal patchy and somewhat nodular airspace disease extending from images 139 through 141 of series 3  There is minimal patchy FDG activity with max SUV of 2 4 which has a more nonsolid inflammatory appearance with atypical presentation of malignancy not excluded  For reference, max SUV of the mediastinal blood pool is 2 2  Patient's known additional lung nodules seen on recent dedicated CT of chest dated 5/19/2022 are poorly visualized on current low-dose unenhanced CT and are beyond the limits of resolution for PET CT  CT images: Trace bilateral gynecomastia   Atherosclerotic vascular calcifications including those of the coronary arteries are noted  ABDOMEN:   On image 169 of series 3, patient's known 2 7 cm left adrenal nodule exhibits minimal FDG activity with max SUV of 2 4  This nodule appears to contain a tiny focus of macroscopic fat suggesting benign nodule although findings can be further characterized with dedicated MRI of the abdomen as clinically indicated  CT images: 3 8 cm infrarenal abdominal aortic aneurysm  Atherosclerotic vascular calcifications are noted  PELVIS: No FDG avid soft tissue lesions are seen  CT images: Unremarkable  OSSEOUS STRUCTURES: No FDG avid lesions are seen  CT images: Degenerative changes are noted involving the spine  Impression 1  Minimal patchy FDG activity involving the dependent left lower lobe in region of patient's known 11 mm nodular density which appears somewhat more semisolid on the prior exam and is associated with more peripheral patchy and nodular airspace disease  The constellation of findings is most suggestive of an inflammatory process with atypical presentation of malignancy not excluded  At minimum, short interval follow-up with CT of chest in 3 months is recommended to ensure stability and exclude developing malignancy of low metabolic activity  2   Minimal FDG activity associated with patient's known 2 7 cm left adrenal nodule which likely contains tiny focus of microscopic fat suggesting benign nodule  Findings can be further characterized with dedicated MRI of the abdomen as clinically indicated  3   3 8 cm infrarenal abdominal aortic aneurysm  The study was marked in EPIC for significant notification  The study was marked in Epic for follow-up  Workstation performed: [id number]      No Barium Swallow results available for this patient

## 2022-09-20 NOTE — ASSESSMENT & PLAN NOTE
Mr Yonny Costa is a very pleasant 49-year-old male who presents to my office for follow-up regarding a pulmonary nodule  I have personally reviewed his imaging in PACS  This demonstrates a 1 1 cm left lower lobe pulmonary nodule  This has demonstrated stability for the past 6 months  Today in the office, we had a long conversation regarding his CT scan  I explained to him that this nodule has demonstrated stability for the past 6 months  I believe that it is safe to continue to just watch this pulmonary nodule at this time  I did answer all of his questions as well as his wife's questions and I do believe that they are satisfied with this plan  Ordered CT scan of the chest at this time and he will follow up with me in 6 months  I did explain to him that if this demonstrates any kind of growth over the next 6 months, then I would likely recommend a VATS wedge resection at that time      Jose Sweeney MD  Thoracic Surgery  (Available on [employer])  Office: [phone]

## 2022-09-22 ENCOUNTER — TELEPHONE (OUTPATIENT)
Dept: GYNECOLOGIC ONCOLOGY | Facility: CLINIC | Age: 57
End: 2022-09-22

## 2022-09-22 DIAGNOSIS — K44.9 HIATAL HERNIA: ICD-10-CM

## 2022-09-22 RX ORDER — PANTOPRAZOLE SODIUM 40 MG/1
40 TABLET, DELAYED RELEASE ORAL
Qty: 90 TABLET | Refills: 0 | Status: SHIPPED | OUTPATIENT
Start: 2022-09-22

## 2022-09-22 NOTE — TELEPHONE ENCOUNTER
[facility] called to see if they can increase the pt's pantoprazole to a 90 day supply   Please call back to confirm at [phone] ref #[id number]

## 2022-10-12 PROBLEM — Z00.00 HEALTH MAINTENANCE EXAMINATION: Status: RESOLVED | Noted: 2021-05-14 | Resolved: 2022-10-12

## 2022-10-13 ENCOUNTER — OFFICE VISIT (OUTPATIENT)
Dept: FAMILY MEDICINE CLINIC | Facility: CLINIC | Age: 57
End: 2022-10-13
Payer: COMMERCIAL

## 2022-10-13 VITALS
SYSTOLIC BLOOD PRESSURE: 119 MMHG | TEMPERATURE: 97.7 F | HEART RATE: 63 BPM | HEIGHT: 75 IN | WEIGHT: 266 LBS | BODY MASS INDEX: 33.07 KG/M2 | DIASTOLIC BLOOD PRESSURE: 80 MMHG | OXYGEN SATURATION: 98 %

## 2022-10-13 DIAGNOSIS — Z13.1 SCREENING FOR DIABETES MELLITUS: ICD-10-CM

## 2022-10-13 DIAGNOSIS — E78.2 MIXED HYPERLIPIDEMIA: ICD-10-CM

## 2022-10-13 DIAGNOSIS — G47.33 SEVERE OBSTRUCTIVE SLEEP APNEA: ICD-10-CM

## 2022-10-13 DIAGNOSIS — Z00.00 HEALTH MAINTENANCE EXAMINATION: Primary | ICD-10-CM

## 2022-10-13 DIAGNOSIS — F41.9 ANXIETY: ICD-10-CM

## 2022-10-13 DIAGNOSIS — Z12.5 SCREENING FOR PROSTATE CANCER: ICD-10-CM

## 2022-10-13 PROCEDURE — 99396 PREV VISIT EST AGE 40-64: CPT | Performed by: PHYSICIAN ASSISTANT

## 2022-10-13 NOTE — PROGRESS NOTES
Name: Andrey Ortega      : 1965      MRN: [id number]  Encounter Provider: Erwin Hughes PA-C  Encounter Date: 10/13/2022   Encounter department: [address] [place] [address]     1  Health maintenance examination    2  Mixed hyperlipidemia  -     Lipid Panel with Direct LDL reflex; Future    3  Severe obstructive sleep apnea    4  Anxiety    5  Screening for diabetes mellitus  -     Comprehensive metabolic panel; Future    6  Screening for prostate cancer  -     PSA, Total Screen; Future  hx reviewed and updated  Exam unremarkable  Update screening labs and HM  Declines CRC screening  No acute concerns  Annual follow ups, earlier prn  Subjective     Pt presents for physical exam  HLD on crestor due for labs  Anxiety controlled on prozac  CORI non compliant with CPAP but has recently restarted  GERD on PPI  No acute concerns  Non smoker  No abuse/misuse of drugs/alcohol  Only interval health change was 4mm L UVJ stone  Declines CRC screening  Review of Systems   Constitutional: Negative for chills, fatigue and fever  HENT: Negative for congestion, ear pain, hearing loss, nosebleeds, postnasal drip, rhinorrhea, sinus pressure, sinus pain, sneezing and sore throat  Eyes: Negative for pain, discharge, itching and visual disturbance  Respiratory: Negative for cough, chest tightness, shortness of breath and wheezing  Cardiovascular: Negative for chest pain, palpitations and leg swelling  Gastrointestinal: Negative for abdominal pain, blood in stool, constipation, diarrhea, nausea and vomiting  Genitourinary: Negative for frequency and urgency  Neurological: Negative for dizziness, light-headedness and numbness         Past Medical History:   Diagnosis Date   • Anxiety    • Eczema    • Gastric ulcer    • Hypercholesterolemia    • Impacted cerumen of left ear 2019   • Sleep apnea      Past Surgical History:   Procedure Laterality Date   • CYST REMOVAL • EGD     • EXAMINATION UNDER ANESTHESIA N/A 9/24/2020    Procedure: DRUG INDUCED SLEEP ENDOSCOPY;  Surgeon: Marcos Fontana MD;  Location: [facility];  Service: ENT   • LASIK       Family History   Problem Relation Age of Onset   • Cancer Mother         ovarian and stomach   • Coronary artery disease Mother    • Coronary artery disease Father    • Cancer Father         pancratic     Social History     Socioeconomic History   • Marital status: /Civil Union     Spouse name: None   • Number of children: None   • Years of education: None   • Highest education level: None   Occupational History   • None   Tobacco Use   • Smoking status: Former Smoker     Quit date: 2007     Years since quitting: 15 7   • Smokeless tobacco: Never Used   Vaping Use   • Vaping Use: Never used   Substance and Sexual Activity   • Alcohol use: Yes     Comment: socially   • Drug use: Never   • Sexual activity: Yes     Partners: Female   Other Topics Concern   • None   Social History Narrative   • None     Social Determinants of Health     Financial Resource Strain: Not on file   Food Insecurity: Not on file   Transportation Needs: Not on file   Physical Activity: Not on file   Stress: Not on file   Social Connections: Not on file   Intimate Partner Violence: Not on file   Housing Stability: Not on file     Current Outpatient Medications on File Prior to Visit   Medication Sig   • pantoprazole (PROTONIX) 40 mg tablet Take 1 tablet (40 mg total) by mouth daily before breakfast   • Crisaborole (Eucrisa) 2 % OINT Apply topically twice a day Monday to Friday to trunk and extremities   • fluocinonide (LIDEX) 0 05 % ointment Apply topically 2 (two) times a day (Patient not taking: Reported on 7/21/2022)   • FLUoxetine (PROzac) 40 MG capsule TAKE 1 CAPSULE DAILY AT BEDTIME   • hydrOXYzine HCL (ATARAX) 25 mg tablet TAKE 1 TABLET DAILY AT BEDTIME   • multivitamin (THERAGRAN) TABS Take 1 tablet by mouth daily   • Promethazine-DM (PHENERGAN-DM) 6 25-15 mg/5 mL oral syrup Take 5 mL by mouth daily at bedtime as needed for cough   • rosuvastatin (CRESTOR) 5 mg tablet TAKE 1 TABLET DAILY AT BEDTIME     Allergies   Allergen Reactions   • Shellfish-Derived Products - Food Allergy Anaphylaxis, Dizziness and Lightheadedness   • Medical Tape Itching and Rash     Immunization History   Administered Date(s) Administered   • Tdap 12/01/2013       Objective     /80   Pulse 63   Temp 97 7 °F (36 5 °C)   Ht 6' 3" (1 905 m)   Wt 121 kg (266 lb)   SpO2 98%   BMI 33 25 kg/m²     Physical Exam  Vitals and nursing note reviewed  Constitutional:       General: He is not in acute distress  Appearance: Normal appearance  HENT:      Head: Normocephalic and atraumatic  Nose: Nose normal       Mouth/Throat:      Mouth: Mucous membranes are moist       Pharynx: Oropharynx is clear  No oropharyngeal exudate or posterior oropharyngeal erythema  Eyes:      Pupils: Pupils are equal, round, and reactive to light  Cardiovascular:      Rate and Rhythm: Normal rate and regular rhythm  Heart sounds: Normal heart sounds  Pulmonary:      Effort: Pulmonary effort is normal  No respiratory distress  Breath sounds: Normal breath sounds  No wheezing, rhonchi or rales  Abdominal:      General: Bowel sounds are normal       Palpations: Abdomen is soft  Musculoskeletal:         General: Normal range of motion  Cervical back: Normal range of motion and neck supple  Right lower leg: No edema  Left lower leg: No edema  Lymphadenopathy:      Cervical: No cervical adenopathy  Skin:     General: Skin is warm and dry  Neurological:      Mental Status: He is alert and oriented to person, place, and time     Psychiatric:         Mood and Affect: Mood and affect normal        Yumi Carvajal PA-C

## 2023-01-30 DIAGNOSIS — L20.89 OTHER ATOPIC DERMATITIS: ICD-10-CM

## 2023-01-30 RX ORDER — CRISABOROLE 20 MG/G
OINTMENT TOPICAL
Qty: 100 G | Refills: 3 | Status: SHIPPED | OUTPATIENT
Start: 2023-01-30 | End: 2023-02-07 | Stop reason: SDUPTHER

## 2023-02-07 ENCOUNTER — TELEPHONE (OUTPATIENT)
Dept: OTHER | Facility: OTHER | Age: 58
End: 2023-02-07

## 2023-02-07 DIAGNOSIS — L20.89 OTHER ATOPIC DERMATITIS: ICD-10-CM

## 2023-02-07 RX ORDER — CRISABOROLE 20 MG/G
OINTMENT TOPICAL
Qty: 100 G | Refills: 0 | Status: SHIPPED | OUTPATIENT
Start: 2023-02-07

## 2023-02-07 NOTE — TELEPHONE ENCOUNTER
[facility] from [employer] called to let the office know that Crisaborole Lazaro Soo) 2 % OINT needs prior authorization from insurance company     Please contact [facility] at [phone]

## 2023-03-01 ENCOUNTER — HOSPITAL ENCOUNTER (OUTPATIENT)
Dept: CT IMAGING | Facility: HOSPITAL | Age: 58
Discharge: HOME | End: 2023-03-01
Attending: THORACIC SURGERY (CARDIOTHORACIC VASCULAR SURGERY)

## 2023-03-01 DIAGNOSIS — R91.1 PULMONARY NODULE: ICD-10-CM

## 2023-03-06 ENCOUNTER — TELEPHONE (OUTPATIENT)
Dept: GYNECOLOGIC ONCOLOGY | Facility: CLINIC | Age: 58
End: 2023-03-06

## 2023-03-06 NOTE — TELEPHONE ENCOUNTER
Patient called into the office this morning in regards to his appointment with Dr Celestino Ramirez tomorrow  Patient stated that he won't be in the area due to work and questioned whether or not he would be able to do a virtual instead  His appt is for a 6 month f/u with CT in which Sarah Irwin is working on having it read for the appt tomorrow   Patient can be reached back @ [phone]

## 2023-03-10 ENCOUNTER — TELEMEDICINE (OUTPATIENT)
Dept: CARDIAC SURGERY | Facility: CLINIC | Age: 58
End: 2023-03-10

## 2023-03-10 DIAGNOSIS — R91.1 PULMONARY NODULE: Primary | ICD-10-CM

## 2023-03-10 NOTE — PROGRESS NOTES
Virtual Regular Visit    Verification of patient location:    Patient is located in the following state in which I hold an active license [place]      Assessment/Plan:    Problem List Items Addressed This Visit        Other    Pulmonary nodule - Primary     Mr Tomás Samaniego has a left lower lobe pulmonary nodule that we have been following  He is feeling well and recent CT scan personally reviewed in PACs demonstrates continued stability of this nodule  It could represent mucus plug, but since it is persisting, I will repeat a CT scan in 6 months  He will have another virtual visit at that time to discuss the results  He is in agreement with this plan  Relevant Orders    CT chest wo contrast            Reason for visit is   Chief Complaint   Patient presents with   • Virtual Regular Visit        Encounter provider Amena Reddy PA-C    Provider located at [address] [place] [id number] [phone]      Recent Visits  No visits were found meeting these conditions  Showing recent visits within past 7 days and meeting all other requirements  Today's Visits  Date Type Provider Dept   03/10/23 Telemedicine Amena Reddy PA-C  Thoracic Surg [facility]   Showing today's visits and meeting all other requirements  Future Appointments  No visits were found meeting these conditions  Showing future appointments within next 150 days and meeting all other requirements       The patient was identified by name and date of birth  Michelle Xiao was informed that this is a telemedicine visit and that the visit is being conducted through the [employer]  He agrees to proceed     My office door was closed  No one else was in the room  He acknowledged consent and understanding of privacy and security of the video platform  The patient has agreed to participate and understands they can discontinue the visit at any time      Patient is aware this is a billable service  Subjective  Nano Burdick is a 59 y o  male    HPI   Michael Garcia has a left lower lobe pulmonary nodule that we have been following  Recent Chest CT 3/1/23 demonstrates stability of this nodule measuring 1 1x0 7cm with adjacent branching opacities  There are stable punctate tree in bud nodularities in the right upper lobe  He is feeling very well  He has some SOB when climbing up a hill  Otherwise ROS negative  Past Medical History:   Diagnosis Date   • Anxiety    • Eczema    • Gastric ulcer    • Hypercholesterolemia    • Impacted cerumen of left ear 2/27/2019   • Sleep apnea        Past Surgical History:   Procedure Laterality Date   • CYST REMOVAL     • EGD     • EXAMINATION UNDER ANESTHESIA N/A 9/24/2020    Procedure: DRUG INDUCED SLEEP ENDOSCOPY;  Surgeon: Alberteen Lanes, MD;  Location: [facility];  Service: ENT   • LASIK         Current Outpatient Medications   Medication Sig Dispense Refill   • pantoprazole (PROTONIX) 40 mg tablet Take 1 tablet (40 mg total) by mouth daily before breakfast 90 tablet 0   • Crisaborole (Eucrisa) 2 % OINT Apply topically twice a day Monday to Friday to trunk and extremities 100 g 0   • FLUoxetine (PROzac) 40 MG capsule TAKE 1 CAPSULE DAILY AT BEDTIME 90 capsule 3   • hydrOXYzine HCL (ATARAX) 25 mg tablet TAKE 1 TABLET DAILY AT BEDTIME 90 tablet 3   • multivitamin (THERAGRAN) TABS Take 1 tablet by mouth daily     • rosuvastatin (CRESTOR) 5 mg tablet TAKE 1 TABLET DAILY AT BEDTIME 90 tablet 3     No current facility-administered medications for this visit  Allergies   Allergen Reactions   • Shellfish-Derived Products - Food Allergy Anaphylaxis, Dizziness and Lightheadedness   • Medical Tape Itching and Rash       Review of Systems   Respiratory: Positive for shortness of breath (on heavy exertion)  Negative for cough  All other systems reviewed and are negative        Video Exam    There were no vitals filed for this visit     Physical Exam  Constitutional:       General: He is not in acute distress  HENT:      Head: Normocephalic and atraumatic  Pulmonary:      Effort: No respiratory distress  Musculoskeletal:      Cervical back: Normal range of motion  Skin:     General: Skin is dry  Neurological:      Mental Status: He is alert and oriented to person, place, and time     Psychiatric:         Mood and Affect: Mood normal           I spent 5 minutes directly with the patient during this visit

## 2023-03-10 NOTE — ASSESSMENT & PLAN NOTE
Mr Gian Epps has a left lower lobe pulmonary nodule that we have been following  He is feeling well and recent CT scan personally reviewed in PACs demonstrates continued stability of this nodule  It could represent mucus plug, but since it is persisting, I will repeat a CT scan in 6 months  He will have another virtual visit at that time to discuss the results  He is in agreement with this plan

## 2023-03-13 ENCOUNTER — OFFICE VISIT (OUTPATIENT)
Dept: CARDIOLOGY CLINIC | Facility: CLINIC | Age: 58
End: 2023-03-13

## 2023-03-13 VITALS
RESPIRATION RATE: 16 BRPM | OXYGEN SATURATION: 97 % | DIASTOLIC BLOOD PRESSURE: 74 MMHG | HEIGHT: 75 IN | HEART RATE: 87 BPM | WEIGHT: 264 LBS | SYSTOLIC BLOOD PRESSURE: 118 MMHG | BODY MASS INDEX: 32.83 KG/M2

## 2023-03-13 DIAGNOSIS — Z12.11 ENCOUNTER FOR SCREENING COLONOSCOPY: ICD-10-CM

## 2023-03-13 DIAGNOSIS — E78.2 MIXED HYPERLIPIDEMIA: ICD-10-CM

## 2023-03-13 DIAGNOSIS — R06.02 SHORTNESS OF BREATH ON EXERTION: ICD-10-CM

## 2023-03-13 DIAGNOSIS — R12 HEART BURN: Primary | ICD-10-CM

## 2023-03-13 RX ORDER — PANTOPRAZOLE SODIUM 40 MG/1
40 TABLET, DELAYED RELEASE ORAL
Qty: 90 TABLET | Refills: 2 | Status: SHIPPED | OUTPATIENT
Start: 2023-03-13

## 2023-03-13 NOTE — PROGRESS NOTES
[address] [facility]  [address], [facility] Giana Colby  Tel: [phone]      NAME: Isadora Henderson  AGE: 59 y o  SEX: male  : 1965  MRN: [id number]      Chief Complaint:  Chief Complaint   Patient presents with   • New Patient Visit         History of Present Illness:   53-year-old male with dyslipidemia who states that he has been getting heartburn for the last couple of weeks  It is not related to activity  He also mentioned that he has been getting short of breath with activity for a while now  He suffered from COVID-19 infection in 2021  Patient denies chest pressure, palpitations, lightheadedness, syncope, swelling feet, orthopnea, PND, claudication  Patient is a prior smoker who quit smoking over 17 years ago  Mixed hyperlipidemia -  Has had hyperlipidemia for many years  Taking statin regularly along with diet control  Denies myalgia  PCP closely monitoring the blood work      Past Medical History:  Past Medical History:   Diagnosis Date   • Anxiety    • Eczema    • Gastric ulcer    • Hypercholesterolemia    • Impacted cerumen of left ear 2019   • Sleep apnea          Past Surgical History:  Past Surgical History:   Procedure Laterality Date   • CYST REMOVAL     • EGD     • EXAMINATION UNDER ANESTHESIA N/A 2020    Procedure: DRUG INDUCED SLEEP ENDOSCOPY;  Surgeon: Fabio Llanes MD;  Location: [facility];  Service: ENT   • LASIK           Family History:  Family History   Problem Relation Age of Onset   • Cancer Mother         ovarian and stomach   • Coronary artery disease Mother    • Coronary artery disease Father    • Cancer Father         pancratic         Social History:  Social History     Socioeconomic History   • Marital status: /Civil Union     Spouse name: None   • Number of children: None   • Years of education: None   • Highest education level: None   Occupational History   • None   Tobacco Use   • Smoking status: Former     Types: Cigarettes     Quit date:      Years since quittin 2   • Smokeless tobacco: Never   Vaping Use   • Vaping Use: Never used   Substance and Sexual Activity   • Alcohol use: Yes     Comment: socially   • Drug use: Never   • Sexual activity: Yes     Partners: Female   Other Topics Concern   • None   Social History Narrative   • None     Social Determinants of Health     Financial Resource Strain: Not on file   Food Insecurity: Not on file   Transportation Needs: Not on file   Physical Activity: Not on file   Stress: Not on file   Social Connections: Not on file   Intimate Partner Violence: Not on file   Housing Stability: Not on file         Active Problems:  Patient Active Problem List   Diagnosis   • Eczema   • Anxiety   • Mixed hyperlipidemia   • Severe obstructive sleep apnea   • COVID-19 virus infection   • Pulmonary nodule   • AAA (abdominal aortic aneurysm)         The following portions of the patient's history were reviewed and updated as appropriate: past medical history, past surgical history, past family history,  past social history, current medications, allergies and problem list       Review of Systems:  Constitutional: Denies fever, chills  Eyes: Denies eye redness, eye discharge  ENT: Denies hearing loss, sneezing, nasal discharge, sore throat   Respiratory: Denies cough, expectoration, shortness of breath  Cardiovascular: Denies chest pain, palpitations, lower extremity swelling  Gastrointestinal: Denies nausea, vomiting, diarrhea   +heart burn  Genito-Urinary: Denies dysuria, incontinence  Musculoskeletal: Denies back pain, joint pain, muscle pain  Neurologic: Denies lightheadedness, syncope, headache, seizures  Endocrine: Denies polydipsia, temperature intolerance  Allergy and Immunology: Denies hives, insect bite sensitivity  Hematological and Lymphatic: Denies bleeding problems, swollen glands Psychological: Denies depression, suicidal ideation, anxiety, panic  Dermatological: Denies pruritus, rash, skin lesion changes      Vitals:  Vitals:    03/13/23 1603   BP: 118/74   Pulse: 87   Resp: 16   SpO2: 97%       Body mass index is 33 kg/m²  Weight (last 2 days)     Date/Time Weight    03/13/23 1603 120 (264)            Physical Examination:  General: Patient is not in acute distress  Awake, alert, oriented in time, place and person  Responding to commands  Head: Normocephalic  Atraumatic  Eyes: Both pupils normal sized, round and reactive to light  Nonicteric  ENT: Normal external ear canals  Neck: Supple  JVP not raised  Trachea central  No thyromegaly  Lungs: Bilateral bronchovascular breath sounds with no crackles or rhonchi  Chest wall: No tenderness  Cardiovascular: RRR  S1 and S2 normal  No murmur, rub or gallop  Gastrointestinal: Abdomen soft, nontender  No guarding or rigidity  Liver and spleen not palpable  Bowel sounds present  Neurologic: Patient is awake, alert, oriented in time, place and person  Responding to commands  Moving all extremities  Integumentary:  No skin rash  Lymphatic: No cervical lymphadenopathy  Back: Symmetric   No CVA tenderness  Extremities: No clubbing, cyanosis or edema      Laboratory Results:  CBC with diff:   Lab Results   Component Value Date    WBC 15 45 (H) 03/29/2022    RBC 5 14 03/29/2022    HGB 15 0 03/29/2022    HCT 45 2 03/29/2022    MCV 88 03/29/2022    MCH 29 2 03/29/2022    RDW 13 6 03/29/2022     03/29/2022       CMP:  Lab Results   Component Value Date    CREATININE 1 09 03/29/2022    BUN 25 03/29/2022    BUN 20 03/20/2019    K 3 4 (L) 03/29/2022    K 4 8 03/20/2019     03/29/2022     (H) 03/20/2019    CO2 22 03/29/2022    CO2 23 03/20/2019    ALKPHOS 64 03/29/2022    ALT 31 03/29/2022    ALT 16 03/20/2019    AST 14 03/29/2022    AST 16 03/20/2019    BILIDIR 0 15 12/14/2021     Lipid Profile:   No results found for: CHOL  Lab Results Component Value Date    HDL 34 (L) 09/25/2020    HDL 38 (L) 03/20/2019     Lab Results   Component Value Date    LDLCALC 68 09/25/2020    LDLCALC 94 03/20/2019     Lab Results   Component Value Date    TRIG 84 09/25/2020    TRIG 109 03/20/2019       EKG: Reviewed by me  3/13/2023  Normal sinus rhythm      Medications:    Current Outpatient Medications:   •  Crisaborole Elfrieda Mould) 2 % OINT, Apply topically twice a day Monday to Friday to trunk and extremities, Disp: 100 g, Rfl: 0  •  FLUoxetine (PROzac) 40 MG capsule, TAKE 1 CAPSULE DAILY AT BEDTIME, Disp: 90 capsule, Rfl: 3  •  hydrOXYzine HCL (ATARAX) 25 mg tablet, TAKE 1 TABLET DAILY AT BEDTIME, Disp: 90 tablet, Rfl: 3  •  multivitamin (THERAGRAN) TABS, Take 1 tablet by mouth daily, Disp: , Rfl:   •  pantoprazole (PROTONIX) 40 mg tablet, Take 1 tablet (40 mg total) by mouth daily before breakfast, Disp: 90 tablet, Rfl: 2  •  rosuvastatin (CRESTOR) 5 mg tablet, TAKE 1 TABLET DAILY AT BEDTIME, Disp: 90 tablet, Rfl: 3      Allergies: Allergies   Allergen Reactions   • Shellfish-Derived Products - Food Allergy Anaphylaxis, Dizziness and Lightheadedness   • Medical Tape Itching and Rash         Assessment and Plan:  1  Heart burn  2  Shortness of breath on exertion  EKG done in the clinic reviewed with the patient  Check 2D echocardiogram for EF, RWMA  Exercise nuclear stress test ordered for evaluation as this could be an anginal equivalent    3  Mixed hyperlipidemia  Continue statin and diet control  His PCP closely monitors his blood work    4  Encounter for screening colonoscopy    Recommend aggressive risk factor modification and therapeutic lifestyle changes  Low-salt, low-calorie, low-fat, low-cholesterol diet with regular exercise and to optimize weight  I will defer the ordering and monitoring of necessity lab studies to you, but I am available and happy to review and manage any of the data at your request in the future      Discussed concepts of atherosclerosis, including signs and symptoms of cardiac disease  Previous studies were reviewed  Safety measures were reviewed  Questions were entertained and answered  Patient was advised to report any problems requiring medical attention  Follow-up with PCP and appropriate specialist and lab work as discussed  Return for follow up visit as scheduled or earlier, if needed  Thank you for allowing me to participate in the care and evaluation of your patient  Should you have any questions, please feel free to contact me        Rufus Kang MD  3/13/2023,5:04 PM

## 2023-03-14 DIAGNOSIS — L20.89 OTHER ATOPIC DERMATITIS: ICD-10-CM

## 2023-03-15 RX ORDER — CRISABOROLE 20 MG/G
OINTMENT TOPICAL
Qty: 100 G | Refills: 14 | Status: SHIPPED | OUTPATIENT
Start: 2023-03-15

## 2023-04-28 ENCOUNTER — HOSPITAL ENCOUNTER (OUTPATIENT)
Dept: NON INVASIVE DIAGNOSTICS | Facility: CLINIC | Age: 58
Discharge: HOME | End: 2023-04-28

## 2023-04-28 ENCOUNTER — TELEPHONE (OUTPATIENT)
Dept: CARDIOLOGY CLINIC | Facility: CLINIC | Age: 58
End: 2023-04-28

## 2023-04-28 ENCOUNTER — DOCUMENTATION (OUTPATIENT)
Dept: CARDIOLOGY CLINIC | Facility: CLINIC | Age: 58
End: 2023-04-28

## 2023-04-28 VITALS
OXYGEN SATURATION: 98 % | HEART RATE: 57 BPM | WEIGHT: 264 LBS | BODY MASS INDEX: 32.83 KG/M2 | SYSTOLIC BLOOD PRESSURE: 128 MMHG | DIASTOLIC BLOOD PRESSURE: 80 MMHG | HEIGHT: 75 IN

## 2023-04-28 VITALS
DIASTOLIC BLOOD PRESSURE: 74 MMHG | HEIGHT: 75 IN | HEART RATE: 63 BPM | BODY MASS INDEX: 32.83 KG/M2 | WEIGHT: 264 LBS | SYSTOLIC BLOOD PRESSURE: 118 MMHG

## 2023-04-28 DIAGNOSIS — R12 HEART BURN: ICD-10-CM

## 2023-04-28 DIAGNOSIS — R06.09 DYSPNEA ON EXERTION: Primary | ICD-10-CM

## 2023-04-28 DIAGNOSIS — R06.02 SHORTNESS OF BREATH ON EXERTION: ICD-10-CM

## 2023-04-28 DIAGNOSIS — Z91.041 ALLERGY TO IMAGING CONTRAST MEDIA: ICD-10-CM

## 2023-04-28 DIAGNOSIS — R53.83 OTHER FATIGUE: ICD-10-CM

## 2023-04-28 LAB
AORTIC ROOT: 3.6 CM
APICAL FOUR CHAMBER EJECTION FRACTION: 62 %
AV LVOT PEAK GRADIENT: 5 MMHG
AV PEAK GRADIENT: 9 MMHG
CHEST PAIN STATEMENT: NORMAL
E WAVE DECELERATION TIME: 185 MS
FRACTIONAL SHORTENING: 34 % (ref 28–44)
INTERVENTRICULAR SEPTUM IN DIASTOLE (PARASTERNAL SHORT AXIS VIEW): 0.9 CM
INTERVENTRICULAR SEPTUM: 0.9 CM (ref 0.6–1.1)
LAAS-AP2: 22.8 CM2
LAAS-AP4: 16 CM2
LEFT ATRIUM AREA SYSTOLE SINGLE PLANE A4C: 16 CM2
LEFT ATRIUM SIZE: 3.7 CM
LEFT INTERNAL DIMENSION IN SYSTOLE: 3.7 CM (ref 2.1–4)
LEFT VENTRICULAR INTERNAL DIMENSION IN DIASTOLE: 5.6 CM (ref 3.5–6)
LEFT VENTRICULAR POSTERIOR WALL IN END DIASTOLE: 1 CM
LEFT VENTRICULAR STROKE VOLUME: 96 ML
LVSV (TEICH): 96 ML
MAX DIASTOLIC BP: 66 MMHG
MAX HEART RATE: 164 BPM
MAX HR PERCENT: 102 %
MAX HR: 164 BPM
MAX PREDICTED HEART RATE: 161 BPM
MAX. SYSTOLIC BP: 206 MMHG
MV E'TISSUE VEL-SEP: 11 CM/S
MV PEAK A VEL: 0.74 M/S
MV PEAK E VEL: 87 CM/S
MV STENOSIS PRESSURE HALF TIME: 54 MS
MV VALVE AREA P 1/2 METHOD: 4.07 CM2
NUC STRESS DIASTOLIC VOLUME INDEX: 149 ML/M2
NUC STRESS EJECTION FRACTION: 54 %
NUC STRESS SYSTOLIC VOLUME INDEX: 68 ML/M2
PROTOCOL NAME: NORMAL
RATE PRESSURE PRODUCT: NORMAL
RIGHT ATRIUM AREA SYSTOLE A4C: 13.4 CM2
RIGHT VENTRICLE ID DIMENSION: 3.5 CM
SL CV LEFT ATRIUM LENGTH A2C: 5.7 CM
SL CV LV EF: 62
SL CV PED ECHO LEFT VENTRICLE DIASTOLIC VOLUME (MOD BIPLANE) 2D: 155 ML
SL CV PED ECHO LEFT VENTRICLE SYSTOLIC VOLUME (MOD BIPLANE) 2D: 59 ML
SL CV REST NUCLEAR ISOTOPE DOSE: 15.39 MCI
SL CV STRESS NUCLEAR ISOTOPE DOSE: 48.9 MCI
SL CV STRESS RECOVERY BP: NORMAL MMHG
SL CV STRESS RECOVERY HR: 75 BPM
SL CV STRESS RECOVERY O2 SAT: 98 %
SL CV STRESS STAGE REACHED: 3
STRESS ANGINA INDEX: 0
STRESS BASELINE BP: NORMAL MMHG
STRESS BASELINE HR: 57 BPM
STRESS O2 SAT REST: 98 %
STRESS PEAK HR: 164 BPM
STRESS POST ESTIMATED WORKLOAD: 10.1 METS
STRESS POST EXERCISE DUR MIN: 7 MIN
STRESS POST EXERCISE DUR SEC: 30 SEC
STRESS POST O2 SAT PEAK: 97 %
STRESS POST PEAK BP: 206 MMHG
STRESS/REST PERFUSION RATIO: 0.92
TARGET HR FORMULA: NORMAL
TEST INDICATION: NORMAL
TIME IN EXERCISE PHASE: NORMAL
TR MAX PG: 23 MMHG
TR PEAK VELOCITY: 2.4 M/S
TRICUSPID ANNULAR PLANE SYSTOLIC EXCURSION: 2.7 CM
TRICUSPID VALVE PEAK REGURGITATION VELOCITY: 2.41 M/S

## 2023-04-28 RX ORDER — DIPHENHYDRAMINE HCL 50 MG
50 CAPSULE ORAL
Qty: 30 CAPSULE | Refills: 0 | Status: SHIPPED | OUTPATIENT
Start: 2023-04-28 | End: 2023-06-21

## 2023-04-28 RX ORDER — PREDNISONE 20 MG/1
40 TABLET ORAL 3 TIMES DAILY
Qty: 6 TABLET | Refills: 0 | Status: SHIPPED | OUTPATIENT
Start: 2023-04-28 | End: 2023-06-21

## 2023-04-28 NOTE — TELEPHONE ENCOUNTER
Spoke with pt's wife and they  verbally understood the result of pt's Echo complete w/ contrast if indicated

## 2023-04-28 NOTE — PROGRESS NOTES
I spoke with the patient and discussed the results  I understand he is going to have a CTA of the coronaries which would appear to be reasonable

## 2023-04-28 NOTE — TELEPHONE ENCOUNTER
----- Message from Christina Almeida PA-C sent at 4/28/2023  4:24 PM EDT -----  Pls call echo was good  ----- Message -----  From: Roosevelt Chavez MD  Sent: 4/28/2023   3:27 PM EDT  To: Emily Zamora MD

## 2023-04-28 NOTE — TELEPHONE ENCOUNTER
----- Message from Oneida Raygoza PA-C sent at 4/28/2023  4:24 PM EDT -----  Pls call echo was good  ----- Message -----  From: Nghia Hogan MD  Sent: 4/28/2023   3:27 PM EDT  To: Felisa Pressley MD

## 2023-05-04 DIAGNOSIS — F41.9 ANXIETY: ICD-10-CM

## 2023-05-04 DIAGNOSIS — E78.2 MIXED HYPERLIPIDEMIA: ICD-10-CM

## 2023-05-04 RX ORDER — FLUOXETINE HYDROCHLORIDE 40 MG/1
CAPSULE ORAL
Qty: 90 CAPSULE | Refills: 3 | Status: SHIPPED | OUTPATIENT
Start: 2023-05-04

## 2023-05-04 RX ORDER — ROSUVASTATIN CALCIUM 5 MG/1
TABLET, COATED ORAL
Qty: 90 TABLET | Refills: 3 | Status: SHIPPED | OUTPATIENT
Start: 2023-05-04

## 2023-05-10 ENCOUNTER — OFFICE VISIT (OUTPATIENT)
Dept: DERMATOLOGY | Facility: CLINIC | Age: 58
End: 2023-05-10

## 2023-05-10 VITALS — HEIGHT: 74 IN | TEMPERATURE: 97.4 F | BODY MASS INDEX: 33.37 KG/M2 | WEIGHT: 260 LBS

## 2023-05-10 DIAGNOSIS — D18.01 CHERRY ANGIOMA: ICD-10-CM

## 2023-05-10 DIAGNOSIS — L85.3 XEROSIS OF SKIN: ICD-10-CM

## 2023-05-10 DIAGNOSIS — D22.9 MULTIPLE MELANOCYTIC NEVI: Primary | ICD-10-CM

## 2023-05-10 DIAGNOSIS — L20.89 OTHER ATOPIC DERMATITIS: ICD-10-CM

## 2023-05-10 DIAGNOSIS — L82.1 SEBORRHEIC KERATOSIS: ICD-10-CM

## 2023-05-10 DIAGNOSIS — L30.9 ECZEMA, UNSPECIFIED TYPE: ICD-10-CM

## 2023-05-10 DIAGNOSIS — L81.4 LENTIGO: ICD-10-CM

## 2023-05-10 DIAGNOSIS — L60.3 NAIL DYSTROPHY: ICD-10-CM

## 2023-05-10 RX ORDER — HYDROXYZINE HYDROCHLORIDE 25 MG/1
25 TABLET, FILM COATED ORAL
Qty: 90 TABLET | Refills: 3 | Status: SHIPPED | OUTPATIENT
Start: 2023-05-10

## 2023-05-10 RX ORDER — CRISABOROLE 20 MG/G
OINTMENT TOPICAL
Qty: 100 G | Refills: 14 | Status: SHIPPED | OUTPATIENT
Start: 2023-05-10

## 2023-05-10 NOTE — PATIENT INSTRUCTIONS
"FOLLOW UP ATOPIC DERMATITIS     Physical Exam:  Anatomic Location Affected:  Trunk  Morphological Description:  eczematous pink plaques  Body Surface Area Today:  Less than 1%  Overall Severity: mild  Pertinent Positives:  Pertinent Negatives: Additional History of Present Condition:  Eucerin Cream and Eucrisa 2% Ointment  Hydroxyzine 25 mg tablet  Chest, back, arms  Assessment and Plan:  Based on a thorough discussion of this condition and the management approach to it (including a comprehensive discussion of the known risks, side effects and potential benefits of treatment), the patient (family) agrees to implement the following specific plan:  Continue Eucrisa 2% Ointment  Refills sent to [facility]  Continue Hydroxyzine 25 mg but we recommend only using when needed not daily     Assessment and Plan:   Atopic Dermatitis is a chronic, itchy skin condition that is very common in children but may occur at any age  It is also known as “eczema” or “atopic eczema ” It is the most common form of dermatitis  Atopic dermatitis usually occurs in people who have an “atopic tendency ”  This means they may develop any or all of these closely linked conditions: Atopic dermatitis, asthma, hay fever (allergic rhinitis), eosinophilic esophagitis, and gastroenteritis  Often these conditions run within families with a parent, child or sibling also affected  A family history of asthma, eczema or hay fever is particularly useful in diagnosing atopic dermatitis in infants  Atopic dermatitis arises because of a complex interaction of genetic and environmental factors  These include defects in skin barrier function making the skin more susceptible to irritation by soap and other contact irritants, the weather, temperature and non-specific triggers  There is also an element of immune system dysregulation that is often present    By definition, it is chronic and has a \"waxing-waning\" nature; flares should be expected " "but with good education and treatment strategies can be minimized  Some specific tips we discussed:  Dry skin care  Using only mild cleansers (hypoallergenic and without fragrances) and fragrance free detergent (not “unscented” products which contain a masking agent); we discussed avoiding irritants/fragranced products  The importance of regular application of moisturizers daily (at least 3 times a day)  The known and theoretical side effects of steroids at length, including but not limited to atrophy of skin and increased pressure in eye (glaucoma) and clouding of the eye's lens (cataracts) if used in or around the eye for extended durations  The specific over-the-counter interventions and medications  Side effects, risks and benefits of topical and oral medications discussed  After lengthy discussion of etiology and treatment options, we decided to implement the following personalized treatment plan:    MELANOCYTIC NEVI (\"Moles\")    Physical Exam:  Anatomic Location Affected:   Mostly on sun-exposed areas of the trunk and extremities  Morphological Description:  Scattered, 1-4mm round to ovoid, symmetrical-appearing, even bordered, skin colored to dark brown macules/papules, mostly in sun-exposed areas  Pertinent Positives:  Pertinent Negatives:     Additional History of Present Condition:      Assessment and Plan:  Based on a thorough discussion of this condition and the management approach to it (including a comprehensive discussion of the known risks, side effects and potential benefits of treatment), the patient (family) agrees to implement the following specific plan:  When outside we recommend using a wide brim hat, sunglasses, long sleeve and pants, sunscreen with SPF 20+ with reapplication every 2 hours, or SPF specific clothing   Benign, reassured  Annual skin check     Melanocytic Nevi  Melanocytic nevi (\"moles\") are tan or brown, raised or flat areas of the skin which have an increased number of " "melanocytes  Melanocytes are the cells in our body which make pigment and account for skin color  Some moles are present at birth (I e , \"congenital nevi\"), while others come up later in life (i e , \"acquired nevi\")  The sun can stimulate the body to make more moles  Sunburns are not the only thing that triggers more moles  Chronic sun exposure can do it too  Clinically distinguishing a healthy mole from melanoma may be difficult, even for experienced dermatologists  The \"ABCDE's\" of moles have been suggested as a means of helping to alert a person to a suspicious mole and the possible increased risk of melanoma  The suggestions for raising alert are as follows:    Asymmetry: Healthy moles tend to be symmetric, while melanomas are often asymmetric  Asymmetry means if you draw a line through the mole, the two halves do not match in color, size, shape, or surface texture  Asymmetry can be a result of rapid enlargement of a mole, the development of a raised area on a previously flat lesion, scaling, ulceration, bleeding or scabbing within the mole  Any mole that starts to demonstrate \"asymmetry\" should be examined promptly by a board certified dermatologist      Border: Healthy moles tend to have discrete, even borders  The border of a melanoma often blends into the normal skin and does not sharply delineate the mole from normal skin  Any mole that starts to demonstrate \"uneven borders\" should be examined promptly by a board certified dermatologist      Color: Healthy moles tend to be one color throughout  Melanomas tend to be made up of different colors ranging from dark black, blue, white, or red  Any mole that demonstrates a color change should be examined promptly by a board certified dermatologist      Diameter: Healthy moles tend to be smaller than 0 6 cm in size; an exception are \"congenital nevi\" that can be larger    Melanomas tend to grow and can often be greater than 0 6 cm (1/4 of an inch, " "or the size of a pencil eraser)  This is only a guideline, and many normal moles may be larger than 0 6 cm without being unhealthy  Any mole that starts to change in size (small to bigger or bigger to smaller) should be examined promptly by a board certified dermatologist      Evolving: Healthy moles tend to \"stay the same  \"  Melanomas may often show signs of change or evolution such as a change in size, shape, color, or elevation  Any mole that starts to itch, bleed, crust, burn, hurt, or ulcerate or demonstrate a change or evolution should be examined promptly by a board certified dermatologist         LENTIGO    Physical Exam:  Anatomic Location Affected:  trunk, arms  Morphological Description:  Light brown macules  Pertinent Positives:  Pertinent Negatives: Additional History of Present Condition:      Assessment and Plan:  Based on a thorough discussion of this condition and the management approach to it (including a comprehensive discussion of the known risks, side effects and potential benefits of treatment), the patient (family) agrees to implement the following specific plan:  When outside we recommend using a wide brim hat, sunglasses, long sleeve and pants, sunscreen with SPF 62+ with reapplication every 2 hours, or SPF specific clothing       What is a lentigo? A lentigo is a pigmented flat or slightly raised lesion with a clearly defined edge  Unlike an ephelis (freckle), it does not fade in the winter months  There are several kinds of lentigo  The name lentigo originally referred to its appearance resembling a small lentil  The plural of lentigo is lentigines, although “lentigos” is also in common use  Who gets lentigines? Lentigines can affect males and females of all ages and races  Solar lentigines are especially prevalent in fair skinned adults  Lentigines associated with syndromes are present at birth or arise during childhood  What causes lentigines?   Common forms of lentigo are " "due to exposure to ultraviolet radiation:  Sun damage including sunburn   Indoor tanning   Phototherapy, especially photochemotherapy (PUVA)    Ionizing radiation, eg radiation therapy, can also cause lentigines  Several familial syndromes associated with widespread lentigines originate from mutations in Toño-MAP kinase, mTOR signaling and PTEN pathways  What is the treatment for lentigines? Most lentigines are left alone  Attempts to lighten them may not be successful  The following approaches are used:  SPF 50+ broad-spectrum sunscreen   Hydroquinone bleaching cream   Alpha hydroxy acids   Vitamin C   Retinoids   Azelaic acid   Chemical peels  Individual lesions can be permanently removed using:  Cryotherapy   Intense pulsed light   Pigment lasers    How can lentigines be prevented? Lentigines associated with exposure ultraviolet radiation can be prevented by very careful sun protection  Clothing is more successful at preventing new lentigines than are sunscreens  What is the outlook for lentigines? Lentigines usually persist  They may increase in number with age and sun exposure  Some in sun-protected sites may fade and disappear  SOSA ANGIOMAS    Physical Exam:  Anatomic Location Affected:  trunk  Morphological Description:  Scattered cherry red, 1-4 mm papules  Pertinent Positives:  Pertinent Negatives: Additional History of Present Condition:      Assessment and Plan:  Based on a thorough discussion of this condition and the management approach to it (including a comprehensive discussion of the known risks, side effects and potential benefits of treatment), the patient (family) agrees to implement the following specific plan:  Monitor for changes  Benign, reassured      Assessment and Plan:    Cherry angioma, also known as [facility] spots, are benign vascular skin lesions  A \"cherry angioma\" is a firm red, blue or purple papule, 0 1-1 cm in diameter   When thrombosed, they can appear " "black in colour until evaluated with a dermatoscope when the red or purple colour is more easily seen  Cherry angioma may develop on any part of the body but most often appear on the scalp, face, lips and trunk  An angioma is due to proliferating endothelial cells; these are the cells that line the inside of a blood vessel  Angiomas can arise in early life or later in life; the most common type of angioma is a cherry angioma  Cherry angiomas are very common in males and females of any age or race  They are more noticeable in white skin than in skin of colour  They markedly increase in number from about the age of 36  There may be a family history of similar lesions  Eruptive cherry angiomas have been rarely reported to be associated with internal malignancy  The cause of angiomas is unknown  Genetic analysis of cherry angiomas has shown that they frequently carry specific somatic missense mutations in the GNAQ and GNA11 (Q209H) genes, which are involved in other vascular and melanocytic proliferations  SEBORRHEIC KERATOSIS; NON-INFLAMED    Physical Exam:  Anatomic Location Affected:  trunk  Morphological Description:  Flat and raised, waxy, smooth to warty textured, yellow to brownish-grey to dark brown to blackish, discrete, \"stuck-on\" appearing papules  Pertinent Positives:  Pertinent Negatives: Additional History of Present Condition:      Assessment and Plan:  Based on a thorough discussion of this condition and the management approach to it (including a comprehensive discussion of the known risks, side effects and potential benefits of treatment), the patient (family) agrees to implement the following specific plan:  Monitor for changes  Benign, reassured      Seborrheic Keratosis  A seborrheic keratosis is a harmless warty spot that appears during adult life as a common sign of skin aging    Seborrheic keratoses can arise on any area of skin, covered or uncovered, with the usual exception of the " "palms and soles  They do not arise from mucous membranes  Seborrheic keratoses can have highly variable appearance  Seborrheic keratoses are extremely common  It has been estimated that over 90% of adults over the age of 61 years have one or more of them  They occur in males and females of all races, typically beginning to erupt in the 35s or 45s  They are uncommon under the age of 21 years  The precise cause of seborrhoeic keratoses is not known  Seborrhoeic keratoses are considered degenerative in nature  As time goes by, seborrheic keratoses tend to become more numerous  Some people inherit a tendency to develop a very large number of them; some people may have hundreds of them  There is no easy way to remove multiple lesions on a single occasion  Unless a specific lesion is \"inflamed\" and is causing pain or stinging/burning or is bleeding, most insurance companies do not authorize treatment  XEROSIS (\"DRY SKIN\")    Physical Exam:  Anatomic Location Affected:  diffuse  Morphological Description:  xerosis  Pertinent Positives:  Pertinent Negatives: Additional History of Present Condition:      Assessment and Plan:  Based on a thorough discussion of this condition and the management approach to it (including a comprehensive discussion of the known risks, side effects and potential benefits of treatment), the patient (family) agrees to implement the following specific plan:  Use moisturizer like Eucerin,Cerave or Aveeno Cream 3 times a day for the dry skin            Dry skin refers to skin that feels dry to touch  Dry skin has a dull surface with a rough, scaly quality  The skin is less pliable and cracked  When dryness is severe, the skin may become inflamed and fissured  Although any body site can be dry, dry skin tends to affect the shins more than any other site      Dry skin is lacking moisture in the outer horny cell layer (stratum corneum) and this results in cracks in the skin " surface  Dry skin is also called xerosis, xeroderma or asteatosis (lack of fat)  It can affect males and females of all ages  There is some racial variability in water and lipid content of the skin  Dry skin that starts in early childhood may be one of about 20 types of ichthyosis (fish-scale skin)  There is often a family history of dry skin  Dry skin is commonly seen in people with atopic dermatitis  Nearly everyone > 60 years has dry skin  Dry skin that begins later may be seen in people with certain diseases and conditions  Postmenopausal women  Hypothyroidism  Chronic renal disease   Malnutrition and weight loss   Subclinical dermatitis   Treatment with certain drugs such as oral retinoids, diuretics and epidermal growth factor receptor inhibitors      What is the treatment for dry skin? The mainstay of treatment of dry skin and ichthyosis is moisturisers/emollients  They should be applied liberally and often enough to:  Reduce itch   Improve the barrier function   Prevent entry of irritants, bacteria   Reduce transepidermal water loss  How can dry skin be prevented? Eliminate aggravating factors:  Reduce the frequency of bathing  A humidifier in winter and air conditioner in summer   Compare having a short shower with a prolonged soak in a bath  Use lukewarm, not hot, water  Replace standard soap with a substitute such as a synthetic detergent cleanser, water-miscible emollient, bath oil, anti-pruritic tar oil, colloidal oatmeal etc    Apply an emollient liberally and often, particularly shortly after bathing, and when itchy  The drier the skin, the thicker this should be, especially on the hands  What is the outlook for dry skin? A tendency to dry skin may persist life-long, or it may improve once contributing factors are controlled        NAIL DYSTROPHY  Physical Exam:  Anatomic Location Affected:  Bilateral great toenails  Morphological Description:  onycholysis   Pertinent Positives:  Pertinent Negatives: Additional History of Present Condition:  Patient is using an OTC topical    Assessment and Plan:  Based on a thorough discussion of this condition and the management approach to it (including a comprehensive discussion of the known risks, side effects and potential benefits of treatment), the patient (family) agrees to implement the following specific plan:  Can continue topical  Patient not interested in oral terbinafine

## 2023-05-10 NOTE — PROGRESS NOTES
"[facility]     Patient Name: Michaelle Mishra  Encounter Date: 5/10/2023     Have you been cared for by a [facility] [address] Dermatologist in the last 3 years and, if so, which description applies to you? Yes  I have been here within the last 3 years, and my medical history has NOT changed since that time  I am MALE/not capable of bearing children  REVIEW OF SYSTEMS:  Have you recently had or currently have any of the following? · No changes in my recent health  PAST MEDICAL HISTORY:  Have you personally ever had or currently have any of the following? If \"YES,\" then please provide more detail  · No changes in my medical history  FAMILY HISTORY:  Any \"first degree relatives\" (parent, brother, sister, or child) with the following? • No changes in my family's known health  PATIENT EXPERIENCE:    • Do you want the Dermatologist to perform a COMPLETE skin exam today including a clinical examination under the \"bra and underwear\" areas? NO  • If necessary, do we have your permission to call and leave a detailed message on your Preferred Phone number that includes your specific medical information?   Yes      Allergies   Allergen Reactions   • Shellfish-Derived Products - Food Allergy Anaphylaxis, Dizziness and Lightheadedness   • Medical Tape Itching and Rash      Current Outpatient Medications:   •  Crisaborole (Eucrisa) 2 % OINT, APPLY TOPICALLY TWICE A DAY MONDAY THROUGH FRIDAY TO TRUNK AND EXTREMITIES, Disp: 100 g, Rfl: 14  •  diphenhydrAMINE (BENADRYL) 50 mg capsule, Take 1 capsule (50 mg total) by mouth 60 minutes pre-procedure Take 1 tab 1 hr prior to cta, Disp: 30 capsule, Rfl: 0  •  FLUoxetine (PROzac) 40 MG capsule, TAKE 1 CAPSULE DAILY AT BEDTIME, Disp: 90 capsule, Rfl: 3  •  hydrOXYzine HCL (ATARAX) 25 mg tablet, TAKE 1 TABLET DAILY AT BEDTIME, Disp: 90 tablet, Rfl: 3  •  multivitamin (THERAGRAN) TABS, Take 1 tablet by mouth daily, Disp: , Rfl:   •  pantoprazole (PROTONIX) 40 " mg tablet, Take 1 tablet (40 mg total) by mouth daily before breakfast, Disp: 90 tablet, Rfl: 2  •  predniSONE 20 mg tablet, Take 2 tablets (40 mg total) by mouth 3 (three) times a day Tab 2 tabs 13hrs prior to cta, take 2 tabs 7 hrs prior to cta and take 2 tabs 1 hr to cta, Disp: 6 tablet, Rfl: 0  •  rosuvastatin (CRESTOR) 5 mg tablet, TAKE 1 TABLET DAILY AT BEDTIME, Disp: 90 tablet, Rfl: 3          • Whom besides the patient is providing clinical information about today's encounter?   o NO ADDITIONAL HISTORIAN (patient alone provided history)    Physical Exam and Assessment/Plan by Diagnosis:    FOLLOW UP ATOPIC DERMATITIS     Physical Exam:  • Anatomic Location Affected:  Trunk  • Morphological Description:  eczematous pink plaques  • Body Surface Area Today:  Less than 1%  • Overall Severity: mild  • Pertinent Positives:  • Pertinent Negatives: Additional History of Present Condition:  Eucerin Cream and Eucrisa 2% Ointment  Hydroxyzine 25 mg tablet  Chest, back, arms  Assessment and Plan:  Based on a thorough discussion of this condition and the management approach to it (including a comprehensive discussion of the known risks, side effects and potential benefits of treatment), the patient (family) agrees to implement the following specific plan:  • Continue Eucrisa 2% Ointment  Refills sent to [facility]  • Continue Hydroxyzine 25 mg but we recommend only using when needed not daily     Assessment and Plan:   Atopic Dermatitis is a chronic, itchy skin condition that is very common in children but may occur at any age  It is also known as “eczema” or “atopic eczema ” It is the most common form of dermatitis  Atopic dermatitis usually occurs in people who have an “atopic tendency ”  This means they may develop any or all of these closely linked conditions: Atopic dermatitis, asthma, hay fever (allergic rhinitis), eosinophilic esophagitis, and gastroenteritis    Often these conditions run within "families with a parent, child or sibling also affected  A family history of asthma, eczema or hay fever is particularly useful in diagnosing atopic dermatitis in infants  Atopic dermatitis arises because of a complex interaction of genetic and environmental factors  These include defects in skin barrier function making the skin more susceptible to irritation by soap and other contact irritants, the weather, temperature and non-specific triggers  There is also an element of immune system dysregulation that is often present  By definition, it is chronic and has a \"waxing-waning\" nature; flares should be expected but with good education and treatment strategies can be minimized  Some specific tips we discussed:  • Dry skin care  • Using only mild cleansers (hypoallergenic and without fragrances) and fragrance free detergent (not “unscented” products which contain a masking agent); we discussed avoiding irritants/fragranced products  • The importance of regular application of moisturizers daily (at least 3 times a day)  • The known and theoretical side effects of steroids at length, including but not limited to atrophy of skin and increased pressure in eye (glaucoma) and clouding of the eye's lens (cataracts) if used in or around the eye for extended durations  • The specific over-the-counter interventions and medications  • Side effects, risks and benefits of topical and oral medications discussed  • After lengthy discussion of etiology and treatment options, we decided to implement the following personalized treatment plan:    MELANOCYTIC NEVI (\"Moles\")    Physical Exam:  • Anatomic Location Affected:   Mostly on sun-exposed areas of the trunk and extremities  • Morphological Description:  Scattered, 1-4mm round to ovoid, symmetrical-appearing, even bordered, skin colored to dark brown macules/papules, mostly in sun-exposed areas  • Pertinent Positives:  • Pertinent Negatives:     Additional History of " "Present Condition:      Assessment and Plan:  Based on a thorough discussion of this condition and the management approach to it (including a comprehensive discussion of the known risks, side effects and potential benefits of treatment), the patient (family) agrees to implement the following specific plan:  • When outside we recommend using a wide brim hat, sunglasses, long sleeve and pants, sunscreen with SPF 42+ with reapplication every 2 hours, or SPF specific clothing   • Benign, reassured  • Annual skin check     Melanocytic Nevi  Melanocytic nevi (\"moles\") are tan or brown, raised or flat areas of the skin which have an increased number of melanocytes  Melanocytes are the cells in our body which make pigment and account for skin color  Some moles are present at birth (I e , \"congenital nevi\"), while others come up later in life (i e , \"acquired nevi\")  The sun can stimulate the body to make more moles  Sunburns are not the only thing that triggers more moles  Chronic sun exposure can do it too  Clinically distinguishing a healthy mole from melanoma may be difficult, even for experienced dermatologists  The \"ABCDE's\" of moles have been suggested as a means of helping to alert a person to a suspicious mole and the possible increased risk of melanoma  The suggestions for raising alert are as follows:    Asymmetry: Healthy moles tend to be symmetric, while melanomas are often asymmetric  Asymmetry means if you draw a line through the mole, the two halves do not match in color, size, shape, or surface texture  Asymmetry can be a result of rapid enlargement of a mole, the development of a raised area on a previously flat lesion, scaling, ulceration, bleeding or scabbing within the mole  Any mole that starts to demonstrate \"asymmetry\" should be examined promptly by a board certified dermatologist      Border: Healthy moles tend to have discrete, even borders    The border of a melanoma often blends into " "the normal skin and does not sharply delineate the mole from normal skin  Any mole that starts to demonstrate \"uneven borders\" should be examined promptly by a board certified dermatologist      Color: Healthy moles tend to be one color throughout  Melanomas tend to be made up of different colors ranging from dark black, blue, white, or red  Any mole that demonstrates a color change should be examined promptly by a board certified dermatologist      Diameter: Healthy moles tend to be smaller than 0 6 cm in size; an exception are \"congenital nevi\" that can be larger  Melanomas tend to grow and can often be greater than 0 6 cm (1/4 of an inch, or the size of a pencil eraser)  This is only a guideline, and many normal moles may be larger than 0 6 cm without being unhealthy  Any mole that starts to change in size (small to bigger or bigger to smaller) should be examined promptly by a board certified dermatologist      Evolving: Healthy moles tend to \"stay the same  \"  Melanomas may often show signs of change or evolution such as a change in size, shape, color, or elevation  Any mole that starts to itch, bleed, crust, burn, hurt, or ulcerate or demonstrate a change or evolution should be examined promptly by a board certified dermatologist         LENTIGO    Physical Exam:  • Anatomic Location Affected:  trunk, arms  • Morphological Description:  Light brown macules  • Pertinent Positives:  • Pertinent Negatives:     Additional History of Present Condition:      Assessment and Plan:  Based on a thorough discussion of this condition and the management approach to it (including a comprehensive discussion of the known risks, side effects and potential benefits of treatment), the patient (family) agrees to implement the following specific plan:  • When outside we recommend using a wide brim hat, sunglasses, long sleeve and pants, sunscreen with SPF 70+ with reapplication every 2 hours, or SPF specific clothing       What " is a lentigo? A lentigo is a pigmented flat or slightly raised lesion with a clearly defined edge  Unlike an ephelis (freckle), it does not fade in the winter months  There are several kinds of lentigo  The name lentigo originally referred to its appearance resembling a small lentil  The plural of lentigo is lentigines, although “lentigos” is also in common use  Who gets lentigines? Lentigines can affect males and females of all ages and races  Solar lentigines are especially prevalent in fair skinned adults  Lentigines associated with syndromes are present at birth or arise during childhood  What causes lentigines? Common forms of lentigo are due to exposure to ultraviolet radiation:  • Sun damage including sunburn   • Indoor tanning   • Phototherapy, especially photochemotherapy (PUVA)    Ionizing radiation, eg radiation therapy, can also cause lentigines  Several familial syndromes associated with widespread lentigines originate from mutations in Toño-MAP kinase, mTOR signaling and PTEN pathways  What is the treatment for lentigines? Most lentigines are left alone  Attempts to lighten them may not be successful  The following approaches are used:  • SPF 50+ broad-spectrum sunscreen   • Hydroquinone bleaching cream   • Alpha hydroxy acids   • Vitamin C   • Retinoids   • Azelaic acid   • Chemical peels  Individual lesions can be permanently removed using:  • Cryotherapy   • Intense pulsed light   • Pigment lasers    How can lentigines be prevented? Lentigines associated with exposure ultraviolet radiation can be prevented by very careful sun protection  Clothing is more successful at preventing new lentigines than are sunscreens  What is the outlook for lentigines? Lentigines usually persist  They may increase in number with age and sun exposure  Some in sun-protected sites may fade and disappear      SOSA ANGIOMAS    Physical Exam:  • Anatomic Location Affected:  trunk  • Morphological "Description:  Scattered cherry red, 1-4 mm papules  • Pertinent Positives:  • Pertinent Negatives: Additional History of Present Condition:      Assessment and Plan:  Based on a thorough discussion of this condition and the management approach to it (including a comprehensive discussion of the known risks, side effects and potential benefits of treatment), the patient (family) agrees to implement the following specific plan:  • Monitor for changes  • Benign, reassured  •     Assessment and Plan:    Cherry angioma, also known as Christine Talib spots, are benign vascular skin lesions  A \"cherry angioma\" is a firm red, blue or purple papule, 0 1-1 cm in diameter  When thrombosed, they can appear black in colour until evaluated with a dermatoscope when the red or purple colour is more easily seen  Cherry angioma may develop on any part of the body but most often appear on the scalp, face, lips and trunk  An angioma is due to proliferating endothelial cells; these are the cells that line the inside of a blood vessel  Angiomas can arise in early life or later in life; the most common type of angioma is a cherry angioma  Cherry angiomas are very common in males and females of any age or race  They are more noticeable in white skin than in skin of colour  They markedly increase in number from about the age of 36  There may be a family history of similar lesions  Eruptive cherry angiomas have been rarely reported to be associated with internal malignancy  The cause of angiomas is unknown  Genetic analysis of cherry angiomas has shown that they frequently carry specific somatic missense mutations in the GNAQ and GNA11 (Q209H) genes, which are involved in other vascular and melanocytic proliferations        SEBORRHEIC KERATOSIS; NON-INFLAMED    Physical Exam:  • Anatomic Location Affected:  trunk  • Morphological Description:  Flat and raised, waxy, smooth to warty textured, yellow to brownish-grey to dark brown to " "blackish, discrete, \"stuck-on\" appearing papules  • Pertinent Positives:  • Pertinent Negatives: Additional History of Present Condition:      Assessment and Plan:  Based on a thorough discussion of this condition and the management approach to it (including a comprehensive discussion of the known risks, side effects and potential benefits of treatment), the patient (family) agrees to implement the following specific plan:  • Monitor for changes  • Benign, reassured  •     Seborrheic Keratosis  A seborrheic keratosis is a harmless warty spot that appears during adult life as a common sign of skin aging  Seborrheic keratoses can arise on any area of skin, covered or uncovered, with the usual exception of the palms and soles  They do not arise from mucous membranes  Seborrheic keratoses can have highly variable appearance  Seborrheic keratoses are extremely common  It has been estimated that over 90% of adults over the age of 61 years have one or more of them  They occur in males and females of all races, typically beginning to erupt in the 35s or 45s  They are uncommon under the age of 21 years  The precise cause of seborrhoeic keratoses is not known  Seborrhoeic keratoses are considered degenerative in nature  As time goes by, seborrheic keratoses tend to become more numerous  Some people inherit a tendency to develop a very large number of them; some people may have hundreds of them  There is no easy way to remove multiple lesions on a single occasion  Unless a specific lesion is \"inflamed\" and is causing pain or stinging/burning or is bleeding, most insurance companies do not authorize treatment  XEROSIS (\"DRY SKIN\")    Physical Exam:  • Anatomic Location Affected:  diffuse  • Morphological Description:  xerosis  • Pertinent Positives:  • Pertinent Negatives:     Additional History of Present Condition:      Assessment and Plan:  Based on a thorough discussion of this condition and the " management approach to it (including a comprehensive discussion of the known risks, side effects and potential benefits of treatment), the patient (family) agrees to implement the following specific plan:  • Use moisturizer like Eucerin,Cerave or Aveeno Cream 3 times a day for the dry skin   •   •    •     Dry skin refers to skin that feels dry to touch  Dry skin has a dull surface with a rough, scaly quality  The skin is less pliable and cracked  When dryness is severe, the skin may become inflamed and fissured  Although any body site can be dry, dry skin tends to affect the shins more than any other site  Dry skin is lacking moisture in the outer horny cell layer (stratum corneum) and this results in cracks in the skin surface  Dry skin is also called xerosis, xeroderma or asteatosis (lack of fat)  It can affect males and females of all ages  There is some racial variability in water and lipid content of the skin  • Dry skin that starts in early childhood may be one of about 20 types of ichthyosis (fish-scale skin)  There is often a family history of dry skin  • Dry skin is commonly seen in people with atopic dermatitis  • Nearly everyone > 60 years has dry skin  Dry skin that begins later may be seen in people with certain diseases and conditions  • Postmenopausal women  • Hypothyroidism  • Chronic renal disease   • Malnutrition and weight loss   • Subclinical dermatitis   • Treatment with certain drugs such as oral retinoids, diuretics and epidermal growth factor receptor inhibitors      What is the treatment for dry skin? The mainstay of treatment of dry skin and ichthyosis is moisturisers/emollients  They should be applied liberally and often enough to:  • Reduce itch   • Improve the barrier function   • Prevent entry of irritants, bacteria   • Reduce transepidermal water loss  How can dry skin be prevented? Eliminate aggravating factors:  • Reduce the frequency of bathing     • A humidifier in winter and air conditioner in summer   • Compare having a short shower with a prolonged soak in a bath  • Use lukewarm, not hot, water  • Replace standard soap with a substitute such as a synthetic detergent cleanser, water-miscible emollient, bath oil, anti-pruritic tar oil, colloidal oatmeal etc    • Apply an emollient liberally and often, particularly shortly after bathing, and when itchy  The drier the skin, the thicker this should be, especially on the hands  What is the outlook for dry skin? A tendency to dry skin may persist life-long, or it may improve once contributing factors are controlled  NAIL DYSTROPHY  Physical Exam:  • Anatomic Location Affected:  Bilateral great toenails  • Morphological Description:  onycholysis   • Pertinent Positives:  • Pertinent Negatives: Additional History of Present Condition:  Patient is using an OTC topical    Assessment and Plan:  Based on a thorough discussion of this condition and the management approach to it (including a comprehensive discussion of the known risks, side effects and potential benefits of treatment), the patient (family) agrees to implement the following specific plan:  • Can continue topical  Patient not interested in oral terbinafine         Scribe Attestation    I,:  Dasia Al am acting as a scribe while in the presence of the attending physician :       I,:  Fabby Dobbs MD personally performed the services described in this documentation    as scribed in my presence :

## 2023-05-19 ENCOUNTER — TELEPHONE (OUTPATIENT)
Dept: CARDIOLOGY CLINIC | Facility: CLINIC | Age: 58
End: 2023-05-19

## 2023-05-19 DIAGNOSIS — R06.09 DYSPNEA ON EXERTION: Primary | ICD-10-CM

## 2023-05-19 RX ORDER — METOPROLOL TARTRATE 100 MG/1
100 TABLET ORAL
Qty: 3 TABLET | Refills: 0 | Status: SHIPPED | OUTPATIENT
Start: 2023-05-19 | End: 2023-06-21

## 2023-05-19 NOTE — NURSING NOTE
"Cardiac CTA Questionairre      Cardiac history     Reason for exam: Shortness of breath     Have you been diagnosed with heart disease? NO     Do you have a family history of heart disease? YES     Have you ever experienced chest pain? NO     Have you had a CABG, angioplasty, cardiac cath, stent placement? NO     Do you have a pacemaker or defibrillator? NO     Have you ever been diagnosed with an irregular heart beat? NO     Do you have a history of having COPD or asthma? NO     Is your asthma controlled? Do you have high blood pressure? NO     Do you have diabetes? NO     Do you have high cholesterol? YES     Do you smoke? NO    Did you ever smoke? YES quit over 15 years ago     General Information     Do you take any male enhancement medications such as Viagra, Levitra, or Cialis (PDE5 inhibitors) NO       Due to nitrate given during test, this needs to be held for 3 days prior to testing and may be            resumed 24 hrs  Do you have allergies? Shellfish (used to have hives but can eat now and in large quantities gets red), Medical tape  Not allergic to Iodine per patient consumes iodinase table salt with no issues  Allergy to intravenous contrast or dye? NO, CT 3/29/22 with no prep and no issues     Do you have kidney disease? NO                             Blood work done:     N/A           BUN:      CREATINE:         GFR:      Height:     6'2\"                     Weight: 260     Call placed to patient to discuss upcoming appointment at [place] [facility] [address] radiology department and complete consultation and Questionnaire with patient via phone  Patient is having a Cardiac CTA  Reviewed patient's allergies, also reviewed medications  No Beta blocker ordered by Cardiologist but did send request to consider ordering and patient aware to take one hour prior to scan time if MD orders   Test instructions given, patient aware to hold all caffeine products for 12 hours prior (0230)to the exam this " includes coffee, decaf, tea, soda and chocolate  Also made aware to avoid solid food for 4 hours prior (1030) to exam and to increase fluids one day prior to exam unless contraindicated  Patient was instructed that they may take all medications as usual unless otherwise directed by physician  Discussed the pre procedure expectations, post procedure expectations, time entailed to perform the study and also the medications that may be used in exam ( beta blocker if needed to  heart rate to under 65 beats per minute for optimal exam results and NTG given during scan for vasodilation)  Reminded patient of location and time expected for procedure, instructed to bring photo ID, insurance card and script  Recommendation for  to and from study given to patient  Informed the patient that the study will last approximately 30-45 minutes  Pt verbalizes understanding of education and instructions  My number was also supplied to patient to call if any further questions or concerns should arise pre or post procedure  Instructions also sent via e-mail to verified address and via epic my chart, see message below  Good Morning Mr Dina Gayle are scheduled on 23 @ 2:30 pm  for a CTA Cardiac in the Radiology department of the [facility] is located at [address] [place] [address], building [address] 1st floor and present to Radiology 15 minutes prior to your appointment time  It is recommended that you come to your appointment with a   Please avoid all caffeine products for 12 hours prior to the exam this includes coffee, decaf, tea, soda, or chocolate  This will be in effect from 23 at 2:30 am till your appointment time  You are also to stop solid food 4 hours prior to the exam at 10:30 am but you can continue to drink fluids (excluding coffee, decaf, tea, soda, or chocolate) prior to the exam, you can increase fluids starting the day prior      You can take all your regular prescribed medication as usual unless otherwise directed by your physician  Your ordering provider may order  for a beta blocker medication named  Metoprolol to be taken, please take 1 hour prior to the scan  If anti-anxiety medication is to be taken, please take 1 hour prior to the scan  During the study a certain medication may be given such as a beta blocker if needed to  heart rate to under 65 beats per minute for optimal exam results  A sublingual tablet of nitroglycerin will be given during scan for vasodilation, prior to contrast administration and final scan  If the heart rate is not able to achieve target (55-60 beats per minute) study may be cancelled after discussing with Radiologist  At a higher heart rate study may not be useful or viable  The study will last approximately 30-45 minutes  If you have any question or concerns please feel free to contact me at the number below,   Deep Rosas RN  [facility] Radiology RN  [address] [place] [address]  [phone] (Office)  [phone] (Fax)  [email or web address]

## 2023-05-19 NOTE — TELEPHONE ENCOUNTER
----- Message from Emy Sharma PA-C sent at 2023 11:16 AM EDT -----  Regarding: FW: Premedication for Cardiac CTA  Pls call pt and advise for him upcoming CTA we need to get his HR down, I have ordered lopressor 100mg to be taken 1 hr prior to cta, ( I sent in 3 pills to [facility]) he should bring the extra 2 pills with him along with taking the other meds as previously prescribed for his allergy, he is scheduled next thur       ----- Message -----  From: Namrata Castrejon RN  Sent: 2023  10:20 AM EDT  To: Georgina Durbin MD, Emy Sharma PA-C  Subject: Premedication for Cardiac CTA                    Good Morning Dr Joey Denton and [facility],     Your patient Garrett Lieberman  1965 is scheduled here at [place] [address] on  23 for a cardiac CTA  After reviewing the notes and vital signs on the patient, I am noticing a  HR in the range of lowest 70's to highest  87   I am writing to you to consider ordering pre-medication of Metoprolol for your patient  He is not currently on any beta blockers  During the study I am allowed to administer a limited amount of Metoprolol  if needed to  heart rate to between 55-60  beats per minute for optimal exam results  It is with many years experience that patients that originally have a higher rate accomplishing the low heart rate not attainable in the time frame that is given and many studies have been cancelled to only be rescheduled at a much later date after the Cardiologist orders pre medication for the patient  I don't want your patients time and travel to be in [facility] [place], also the patient is going to be made aware that a  is recommended for the study  Please consider providing orders a Beta Blocker for your patient, our Radiologist suggest Metoprolol 100 mg to be taken an hour prior to the study   If you should order medication please send the order to the patients pharmacy on file and also please have someone from your office inform the patient of awaiting medication  If any question feel free to call,   Marianna Miramontes RN  [facility] Radiology RN  [address] [place] [address]  [phone] (Office)  [phone] (Fax)  [email or web address]

## 2023-05-19 NOTE — NURSING NOTE
Contacted ordering providers via epic in basket and tiger text connect, see message below  Good Morning Dr Fermín Tran and THEO Calderón,     Your patient Nayely Pederson  1965 is scheduled here at [place] [address] on  23 for a cardiac CTA  After reviewing the notes and vital signs on the patient, I am noticing a  HR in the range of lowest 70's to highest  87  I am writing to you to consider ordering pre-medication of Metoprolol for your patient  He is not currently on any beta blockers  During the study I am allowed to administer a limited amount of Metoprolol  if needed to  heart rate to between 55-60  beats per minute for optimal exam results  It is with many years experience that patients that originally have a higher rate accomplishing the low heart rate not attainable in the time frame that is given and many studies have been cancelled to only be rescheduled at a much later date after the Cardiologist orders pre medication for the patient  I don't want your patients time and travel to be in [facility], also the patient is going to be made aware that a  is recommended for the study  Please consider providing orders a Beta Blocker for your patient, our Radiologist suggest Metoprolol 100 mg to be taken an hour prior to the study  If you should order medication please send the order to the patients pharmacy on file and also please have someone from your office inform the patient of awaiting medication  If any question feel free to call,   Hartley Kayser RN  [place] [facility] RN  [address] [facility]  [phone] (Office)  [phone] (Fax)  [email or web address]

## 2023-05-25 ENCOUNTER — HOSPITAL ENCOUNTER (OUTPATIENT)
Dept: RADIOLOGY | Facility: HOSPITAL | Age: 58
Discharge: HOME | End: 2023-05-25

## 2023-05-25 VITALS
HEART RATE: 62 BPM | DIASTOLIC BLOOD PRESSURE: 59 MMHG | RESPIRATION RATE: 22 BRPM | OXYGEN SATURATION: 99 % | SYSTOLIC BLOOD PRESSURE: 100 MMHG

## 2023-05-25 DIAGNOSIS — R06.09 DYSPNEA ON EXERTION: ICD-10-CM

## 2023-05-25 DIAGNOSIS — R53.83 OTHER FATIGUE: ICD-10-CM

## 2023-05-25 RX ORDER — IODIXANOL 320 MG/ML
100 INJECTION, SOLUTION INTRAVASCULAR
Status: COMPLETED | OUTPATIENT
Start: 2023-05-25 | End: 2023-05-25

## 2023-05-25 RX ORDER — NITROGLYCERIN 0.4 MG/1
0.4 TABLET SUBLINGUAL
Status: COMPLETED | OUTPATIENT
Start: 2023-05-25 | End: 2023-05-25

## 2023-05-25 RX ADMIN — NITROGLYCERIN 0.4 MG: 0.4 TABLET SUBLINGUAL at 14:17

## 2023-05-25 RX ADMIN — IODIXANOL 100 ML: 320 INJECTION, SOLUTION INTRAVASCULAR at 15:00

## 2023-06-21 ENCOUNTER — OFFICE VISIT (OUTPATIENT)
Dept: CARDIOLOGY CLINIC | Facility: CLINIC | Age: 58
End: 2023-06-21
Payer: COMMERCIAL

## 2023-06-21 VITALS
BODY MASS INDEX: 33.88 KG/M2 | SYSTOLIC BLOOD PRESSURE: 100 MMHG | HEIGHT: 74 IN | OXYGEN SATURATION: 96 % | RESPIRATION RATE: 16 BRPM | WEIGHT: 264 LBS | DIASTOLIC BLOOD PRESSURE: 68 MMHG | HEART RATE: 69 BPM

## 2023-06-21 DIAGNOSIS — E66.9 OBESITY (BMI 30-39.9): ICD-10-CM

## 2023-06-21 DIAGNOSIS — E78.2 MIXED HYPERLIPIDEMIA: ICD-10-CM

## 2023-06-21 DIAGNOSIS — R12 HEART BURN: Primary | ICD-10-CM

## 2023-06-21 PROCEDURE — 99214 OFFICE O/P EST MOD 30 MIN: CPT | Performed by: INTERNAL MEDICINE

## 2023-06-21 RX ORDER — ASPIRIN 81 MG/1
81 TABLET, CHEWABLE ORAL DAILY
Start: 2023-06-21

## 2023-06-21 NOTE — PROGRESS NOTES
CARDIOLOGY OFFICE VISIT  [facility]  [address], [facility] Giana Colby  Tel: [phone]      NAME: Rowena Sow  AGE: 59 y o  SEX: male  : 1965  MRN: [id number]      Chief Complaint:  Chief Complaint   Patient presents with   • Follow-up         History of Present Illness:   Patient comes for follow up  States his heartburn has resolved with diet changes  Mild shortness of breath with the activity he feels he has been having since he had the COVID-19 infection in 2021  Pt denies palpitations, lightheadedness, syncope, swelling feet, orthopnea, PND, claudication  Mixed hyperlipidemia -  Has had hyperlipidemia for many years  Taking statin regularly along with diet control  Denies myalgia  PCP closely monitoring the blood work  Obesity -  Trying to lose weight  Patient is a prior smoker who quit smoking over 17 years ago      Past Medical History:  Past Medical History:   Diagnosis Date   • Allergic    • Anxiety    • Eczema    • Gastric ulcer    • Hypercholesterolemia    • Impacted cerumen of left ear 2019   • Sleep apnea          Past Surgical History:  Past Surgical History:   Procedure Laterality Date   • CYST REMOVAL     • EGD     • EXAMINATION UNDER ANESTHESIA N/A 2020    Procedure: DRUG INDUCED SLEEP ENDOSCOPY;  Surgeon: Chela Wadsworth MD;  Location: [facility];  Service: ENT   • LASIK           Family History:  Family History   Problem Relation Age of Onset   • Cancer Mother         ovarian and stomach   • Coronary artery disease Mother    • Coronary artery disease Father    • Cancer Father         pancratic         Social History:  Social History     Socioeconomic History   • Marital status: /Civil Union     Spouse name: None   • Number of children: None   • Years of education: None   • Highest education level: None   Occupational History   • None   Tobacco Use   • Smoking status: Former     Types: Cigarettes     Quit date:      Years since quittin 4   • Smokeless tobacco: Never   Vaping Use   • Vaping Use: Never used   Substance and Sexual Activity   • Alcohol use: Yes     Comment: socially   • Drug use: Never   • Sexual activity: Yes     Partners: Female   Other Topics Concern   • None   Social History Narrative   • None     Social Determinants of Health     Financial Resource Strain: Not on file   Food Insecurity: Not on file   Transportation Needs: Not on file   Physical Activity: Not on file   Stress: Not on file   Social Connections: Not on file   Intimate Partner Violence: Not on file   Housing Stability: Not on file         Active Problems:  Patient Active Problem List   Diagnosis   • Eczema   • Anxiety   • Mixed hyperlipidemia   • Severe obstructive sleep apnea   • COVID-19 virus infection   • Pulmonary nodule   • AAA [facility]ab[facility]) ([facility] )         The following portions of the patient's history were reviewed and updated as appropriate: past medical history, past surgical history, past family history,  past social history, current medications, allergies and problem list       Review of Systems:  Constitutional: Denies fever, chills  Eyes: Denies eye redness, eye discharge  ENT: Denies hearing loss, sneezing, nasal discharge, sore throat   Respiratory: Denies cough, expectoration  Cardiovascular: Denies chest pain, palpitations, lower extremity swelling  Gastrointestinal: Denies abdominal pain, nausea, vomiting, diarrhea  Genito-Urinary: Denies dysuria, incontinence  Musculoskeletal: Denies back pain, joint pain, muscle pain  Neurologic: Denies lightheadedness, syncope, headache, seizures  Endocrine: Denies polydipsia, temperature intolerance  Allergy and Immunology: Denies hives, insect bite sensitivity  Hematological and Lymphatic: Denies bleeding problems, swollen glands   Psychological: Denies depression, suicidal ideation, anxiety, "panic  Dermatological: Denies pruritus, rash, skin lesion changes      Vitals:  Vitals:    06/21/23 0903   BP: 100/68   Pulse: 69   Resp: 16   SpO2: 96%       Body mass index is 33 9 kg/m²  Weight (last 2 days)     Date/Time Weight    06/21/23 0903 120 (264)            Physical Examination:  General: Patient is not in acute distress  Awake, alert, oriented in time, place and person  Responding to commands  Head: Normocephalic  Atraumatic  Eyes: Both pupils normal sized, round and reactive to light  Nonicteric  ENT: Normal external ear canals  Neck: Supple  JVP not raised  Trachea central  No thyromegaly  Lungs: Bilateral bronchovascular breath sounds with no crackles or rhonchi  Chest wall: No tenderness  Cardiovascular: RRR  S1 and S2 normal  No murmur, rub or gallop  Gastrointestinal: Abdomen soft, nontender  No guarding or rigidity  Liver and spleen not palpable  Bowel sounds present  Neurologic: Patient is awake, alert, oriented in time, place and person  Responding to commands  Moving all extremities  Integumentary:  No skin rash  Lymphatic: No cervical lymphadenopathy  Back: Symmetric   No CVA tenderness  Extremities: No clubbing, cyanosis or edema      Laboratory Results:  CBC with diff:   Lab Results   Component Value Date    WBC 15 45 (H) 03/29/2022    RBC 5 14 03/29/2022    HGB 15 0 03/29/2022    HCT 45 2 03/29/2022    MCV 88 03/29/2022    MCH 29 2 03/29/2022    RDW 13 6 03/29/2022     03/29/2022       CMP:  Lab Results   Component Value Date    CREATININE 1 09 03/29/2022    BUN 25 03/29/2022    BUN 20 03/20/2019    K 3 4 (L) 03/29/2022    K 4 8 03/20/2019     03/29/2022     (H) 03/20/2019    CO2 22 03/29/2022    CO2 23 03/20/2019    ALKPHOS 64 03/29/2022    ALT 31 03/29/2022    ALT 16 03/20/2019    AST 14 03/29/2022    AST 16 03/20/2019    BILIDIR 0 15 12/14/2021       Lipid Profile:   No results found for: \"CHOL\"  Lab Results   Component Value Date    HDL 34 (L) 09/25/2020    HDL 38 " (L) 03/20/2019     Lab Results   Component Value Date    LDLCALC 68 09/25/2020    LDLCALC 94 03/20/2019     Lab Results   Component Value Date    TRIG 84 09/25/2020    TRIG 109 03/20/2019       Cardiac testing:   Results for orders placed during the hospital encounter of 04/28/23    Echo complete w/ contrast if indicated    Interpretation Summary  •  Left Ventricle: Left ventricular cavity size is normal  Wall thickness is normal  The left ventricular ejection fraction is 62%  Systolic function is normal  Wall motion is normal  Diastolic function is normal       Results for orders placed during the hospital encounter of 04/28/23    NM myocardial perfusion spect (stress and/or rest)    Interpretation Summary  1  Fair exercise tolerance 10 1 METS  2   2 mm J-point depression with horizontal to upsloping ST segments in lead II at the height of exercise and in early recovery with rapid return to normal   This is a positive test but rapid return to normal suggest the possibility of a false positive  3  No chest discomfort with exercise  4  There is a mild intensity fixed small apical defect most likely related to apical cleft  No reversible defects  5  Normal wall motion  Calculated ejection fraction 54%  6    Resting EKG is normal      Medications:    Current Outpatient Medications:   •  Crisaborole (Eucrisa) 2 % OINT, APPLY TOPICALLY TWICE A DAY MONDAY THROUGH FRIDAY TO TRUNK AND EXTREMITIES, Disp: 100 g, Rfl: 14  •  FLUoxetine (PROzac) 40 MG capsule, TAKE 1 CAPSULE DAILY AT BEDTIME, Disp: 90 capsule, Rfl: 3  •  hydrOXYzine HCL (ATARAX) 25 mg tablet, Take 1 tablet (25 mg total) by mouth daily at bedtime, Disp: 90 tablet, Rfl: 3  •  multivitamin (THERAGRAN) TABS, Take 1 tablet by mouth daily, Disp: , Rfl:   •  pantoprazole (PROTONIX) 40 mg tablet, Take 1 tablet (40 mg total) by mouth daily before breakfast, Disp: 90 tablet, Rfl: 2  •  rosuvastatin (CRESTOR) 5 mg tablet, TAKE 1 TABLET DAILY AT BEDTIME, Disp: 90 tablet, Rfl: 3      Allergies: Allergies   Allergen Reactions   • Shellfish-Derived Products - Food Allergy Anaphylaxis, Dizziness and Lightheadedness   • Medical Tape Itching and Rash         Assessment and Plan:  Echocardiogram and stress test findings discussed with the patient in detail  Patient states he will monitor for symptoms such as chest discomfort and/or KENDRICK etc and if any of them occur he will seek medical help  For now he should add ASA 81 mg OD  1  Heart burn  Resolved with diet changes    2  Mixed hyperlipidemia  Continue statin and diet control  His PCP closely monitors his blood work    3  Obesity (BMI 30-39  9)  Try to lose weight    Recommend aggressive risk factor modification and therapeutic lifestyle changes  Low-salt, low-calorie, low-fat, low-cholesterol diet with regular exercise and to optimize weight  I will defer the ordering and monitoring of necessity lab studies to you, but I am available and happy to review and manage any of the data at your request in the future  Discussed concepts of atherosclerosis, including signs and symptoms of cardiac disease  Previous studies were reviewed  Safety measures were reviewed  Questions were entertained and answered  Patient was advised to report any problems requiring medical attention  Follow-up with PCP and appropriate specialist and lab work as discussed  Return for follow up visit as scheduled or earlier, if needed  Thank you for allowing me to participate in the care and evaluation of your patient  Should you have any questions, please feel free to contact me        Ro Duenas MD  4/98/1483,3:23 AM

## 2023-08-09 ENCOUNTER — HOSPITAL ENCOUNTER (OUTPATIENT)
Dept: VASCULAR ULTRASOUND | Facility: HOSPITAL | Age: 58
Discharge: HOME | End: 2023-08-09
Attending: SURGERY
Payer: COMMERCIAL

## 2023-08-09 DIAGNOSIS — I71.40 ABDOMINAL AORTIC ANEURYSM (AAA) WITHOUT RUPTURE (HCC): ICD-10-CM

## 2023-08-09 PROCEDURE — 93978 VASCULAR STUDY: CPT

## 2023-08-10 PROCEDURE — 93978 VASCULAR STUDY: CPT | Performed by: SURGERY

## 2023-09-08 ENCOUNTER — HOSPITAL ENCOUNTER (OUTPATIENT)
Dept: CT IMAGING | Facility: HOSPITAL | Age: 58
End: 2023-09-08
Payer: COMMERCIAL

## 2023-09-08 DIAGNOSIS — R91.1 PULMONARY NODULE: ICD-10-CM

## 2023-09-08 PROCEDURE — G1004 CDSM NDSC: HCPCS

## 2023-09-08 PROCEDURE — 71250 CT THORAX DX C-: CPT

## 2023-09-15 ENCOUNTER — TELEMEDICINE (OUTPATIENT)
Dept: CARDIAC SURGERY | Facility: CLINIC | Age: 58
End: 2023-09-15
Payer: COMMERCIAL

## 2023-09-15 DIAGNOSIS — R91.1 PULMONARY NODULE: Primary | ICD-10-CM

## 2023-09-15 DIAGNOSIS — D35.02 ADENOMA OF LEFT ADRENAL GLAND: ICD-10-CM

## 2023-09-15 PROBLEM — D35.00 ADRENAL ADENOMA: Status: ACTIVE | Noted: 2023-09-15

## 2023-09-15 PROCEDURE — 99212 OFFICE O/P EST SF 10 MIN: CPT | Performed by: PHYSICIAN ASSISTANT

## 2023-09-15 NOTE — PROGRESS NOTES
Virtual Regular Visit    Verification of patient location:    Patient is located at Other in the following state in which I hold an active license [place]      Assessment/Plan:    Problem List Items Addressed This Visit        Endocrine    Adrenal adenoma     There is a left adrenal nodule that has been stable on recent imaging. This is likely an adenoma. He will follow this up with his PCP. Respiratory    Pulmonary nodule - Primary     Mr Gaurang Tang has a 1.0cm  left lower lobe pulmonary nodule that has remained stable dating back for at least one year, it is possible even slightly decreased in size on most recent CT chest. Given it's stability we will stretch out our monitoring interval to a CT chest in one year. We will see him back at that time to review results. All questions were answered and he is in agreement with this plan. Relevant Orders    CT chest wo contrast            Reason for visit is   Chief Complaint   Patient presents with   • Virtual Regular Visit        Encounter provider Jozef Salcido PA-C    Provider located at [address] [place] [id number] [phone]      Recent Visits  No visits were found meeting these conditions. Showing recent visits within past 7 days and meeting all other requirements  Today's Visits  Date Type Provider Dept   09/15/23 Telemedicine Maylin Alas PA-C [facility]   Showing today's visits and meeting all other requirements  Future Appointments  No visits were found meeting these conditions. Showing future appointments within next 150 days and meeting all other requirements       The patient was identified by name and date of birth. Winter Campos was informed that this is a telemedicine visit and that the visit is being conducted through the [employer]. He agrees to proceed. .  My office door was closed.  No one else was in the room.  He acknowledged consent and understanding of privacy and security of the video platform. The patient has agreed to participate and understands they can discontinue the visit at any time. Patient is aware this is a billable service. Yesenia Garcia is a 59 y.o. male with history of former tobacco abuse, quit 2007 who we are following for a pulmonary nodule. HE was last seen in our office on 3/10/2023 at which point he had a 1.1cm left lower lobe pulmonary nodule with adjacent branching opacities. Stable punctate tree in bud nodularities. He was recommended follow-up imaging in 6 months and now presents for review. CT chest on 9/8/2023 was personally reviewed by myself in PACS and shows the left tubular branching opacity is stable to slighlly decreased comparent to March 2023 now measring 1.0 x 0.5cm. No new nodules nor opacities. Unchanged 2.5 cm left adrenal gland nodule likely representing an adrenal adenoma. On discussion he is feeling well. Denies SOB, cough, hemoptysis, unintentional weight loss, fevers/chills or recent illness. On discussion he is feeling very well. He denies SOB, cough, hemoptysis, unintetnional weightloss, fevers/chills.    Past Medical History:   Diagnosis Date   • Allergic    • Anxiety    • Eczema    • Gastric ulcer    • Hypercholesterolemia    • Impacted cerumen of left ear 02/27/2019   • Sleep apnea        Past Surgical History:   Procedure Laterality Date   • CYST REMOVAL     • EGD     • EXAMINATION UNDER ANESTHESIA N/A 9/24/2020    Procedure: DRUG INDUCED SLEEP ENDOSCOPY;  Surgeon: Rebeca Herring MD;  Location: [facility];  Service: ENT   • LASIK         Current Outpatient Medications   Medication Sig Dispense Refill   • aspirin 81 mg chewable tablet Chew 1 tablet (81 mg total) daily     • Crisaborole (Eucrisa) 2 % OINT APPLY TOPICALLY TWICE A DAY MONDAY THROUGH FRIDAY TO TRUNK AND EXTREMITIES 100 g 14   • FLUoxetine (PROzac) 40 MG capsule TAKE 1 CAPSULE DAILY AT BEDTIME 90 capsule 3   • hydrOXYzine HCL (ATARAX) 25 mg tablet Take 1 tablet (25 mg total) by mouth daily at bedtime 90 tablet 3   • multivitamin (THERAGRAN) TABS Take 1 tablet by mouth daily     • pantoprazole (PROTONIX) 40 mg tablet Take 1 tablet (40 mg total) by mouth daily before breakfast 90 tablet 2   • rosuvastatin (CRESTOR) 5 mg tablet TAKE 1 TABLET DAILY AT BEDTIME 90 tablet 3     No current facility-administered medications for this visit. Allergies   Allergen Reactions   • Shellfish-Derived Products - Food Allergy Anaphylaxis, Dizziness and Lightheadedness   • Medical Tape Itching and Rash       Review of Systems   Constitutional: Negative for chills, diaphoresis and unexpected weight change. HENT: Negative. Eyes: Negative. Respiratory: Negative for cough, shortness of breath and wheezing. Cardiovascular: Negative for chest pain and leg swelling. Gastrointestinal: Negative for abdominal pain, diarrhea and nausea. Endocrine: Negative. Genitourinary: Negative. Musculoskeletal: Negative. Skin: Negative. Allergic/Immunologic: Negative. Neurological: Negative. Hematological: Negative for adenopathy. Does not bruise/bleed easily. Psychiatric/Behavioral: Negative. All other systems reviewed and are negative. Video Exam    There were no vitals filed for this visit. Physical Exam  Constitutional:       General: He is not in acute distress. Appearance: Normal appearance. He is not ill-appearing. HENT:      Head: Normocephalic. Nose: Nose normal.   Pulmonary:      Effort: Pulmonary effort is normal.   Musculoskeletal:      Cervical back: Normal range of motion. Neurological:      Mental Status: He is alert and oriented to person, place, and time.    Psychiatric:         Mood and Affect: Mood normal.          Visit Time  Total Visit Duration: 15 minutes

## 2023-09-15 NOTE — ASSESSMENT & PLAN NOTE
Mr Dylan Reyna has a 1.0cm  left lower lobe pulmonary nodule that has remained stable dating back for at least one year, it is possible even slightly decreased in size on most recent CT chest. Given it's stability we will stretch out our monitoring interval to a CT chest in one year. We will see him back at that time to review results. All questions were answered and he is in agreement with this plan.

## 2023-09-15 NOTE — ASSESSMENT & PLAN NOTE
There is a left adrenal nodule that has been stable on recent imaging. This is likely an adenoma. He will follow this up with his PCP.

## 2023-10-16 ENCOUNTER — TELEMEDICINE (OUTPATIENT)
Dept: VASCULAR SURGERY | Facility: CLINIC | Age: 58
End: 2023-10-16

## 2023-10-16 VITALS
SYSTOLIC BLOOD PRESSURE: 110 MMHG | DIASTOLIC BLOOD PRESSURE: 74 MMHG | WEIGHT: 267 LBS | BODY MASS INDEX: 34.27 KG/M2 | HEART RATE: 74 BPM | HEIGHT: 74 IN

## 2023-10-16 DIAGNOSIS — I71.43 INFRARENAL ABDOMINAL AORTIC ANEURYSM (AAA) WITHOUT RUPTURE (HCC): Primary | ICD-10-CM

## 2023-10-16 NOTE — PROGRESS NOTES
Assessment/Plan:    AAA (abdominal aortic aneurysm) [facility])  49-year-old male, former smoker with HLD, CORI, and infrarenal AAA presents to review yearly imaging and [facility] visit. -Presents without complaints. Denies abdominal, back, or leg pain  -BP well controlled  -No known family history of AAA  -2 children in their 25s  -Palpable distal pulses bilaterally    AOIL 8/9/23- increase in AAA  There is an infrarenal fusiform abdominal aortic aneurysm measuring 3.5 x 3.8cm (Prior 3.4 x 2.9 cm)  The left common iliac artery is ectatic measuring 1.7 cm. The right common iliac and bilateral external iliac arteries are patent and normal in caliber bilaterally. The celiac and superior mesenteric arteries are patent. The proximal renal arteries are patent. Plan:  -AOIL in 1 year with return office visit  -Continue daily aspirin  -BP control  -Call return to office with new or worsening symptoms, questions or concerns.  -Educated on screening 1st degree relatives at age of 46       Diagnoses and all orders for this visit:    Infrarenal abdominal aortic aneurysm (AAA) without rupture ([address])  -     VAS abdominal aorta/iliacs; complete study; Future          Subjective:      Patient ID: Asher Arce is a 59 y.o. male. Patient presents today to review AOIL done 8/9/23. Denies any s/s of belly pain or back pain. Denies leg pain. HPI  See assessment and plan    49-year-old male former smoker presents to review imaging and AAA [facility]. AAA initially incidentally found on CT imaging. Patient presents without complaints. Denies known family history of AAA. Blood pressure is well controlled. Patient works part-time as a  making deliveries. Active, hikes on weekends. Imaging reviewed with increase in AAA size. AAA remains less than 4 cm. We will continue q. yearly routine imaging and office follow-up. Maintained on daily aspirin.         The following portions of the patient's history were reviewed and updated as appropriate: allergies, current medications, past family history, past medical history, past social history, past surgical history, and problem list.    Review of Systems   Constitutional: Negative. HENT: Negative. Eyes: Negative. Respiratory: Negative. Cardiovascular: Negative. Gastrointestinal: Negative. Endocrine: Negative. Genitourinary: Negative. Musculoskeletal: Negative. Skin: Negative. Allergic/Immunologic: Negative. Neurological: Negative. Hematological: Negative. Psychiatric/Behavioral: Negative. I have reviewed and made appropriate changes to the review of systems input by the medical assistant. Objective:      /74 (BP Location: Left arm, Patient Position: Sitting)   Pulse 74   Ht 6' 2" (1.88 m)   Wt 121 kg (267 lb)   BMI 34.28 kg/m²          Physical Exam  Vitals reviewed. Constitutional:       General: He is not in acute distress. Appearance: Normal appearance. HENT:      Head: Normocephalic and atraumatic. Cardiovascular:      Rate and Rhythm: Normal rate. Pulses: Normal pulses. Dorsalis pedis pulses are 2+ on the right side and 2+ on the left side. Heart sounds: Normal heart sounds. No murmur heard. Pulmonary:      Effort: Pulmonary effort is normal.   Abdominal:      General: Abdomen is flat. Bowel sounds are normal. There is no abdominal bruit. Palpations: Abdomen is soft. There is no pulsatile mass. Tenderness: There is no abdominal tenderness. Musculoskeletal:         General: Normal range of motion. Right lower leg: No edema. Left lower leg: No edema. Skin:     General: Skin is warm. Capillary Refill: Capillary refill takes less than 2 seconds. Neurological:      Mental Status: He is alert and oriented to person, place, and time. Sensory: No sensory deficit. Motor: No weakness.    Psychiatric:         Behavior: Behavior normal.         I have spent a total time of 22 minutes on 10/16/23 in caring for this patient including Diagnostic results, Instructions for management, Patient and family education, Importance of tx compliance, Risk factor reductions, Impressions, Documenting in the medical record, Reviewing / ordering tests, medicine, procedures  , and Obtaining or reviewing history  .     Vitals:    10/16/23 0818   BP: 110/74   BP Location: Left arm   Patient Position: Sitting   Pulse: 74   Weight: 121 kg (267 lb)   Height: 6' 2" (1.88 m)       Patient Active Problem List   Diagnosis    Eczema    Anxiety    Mixed hyperlipidemia    Severe obstructive sleep apnea    COVID-19 virus infection    Pulmonary nodule    AAA (abdominal aortic aneurysm) (HCC)    Adrenal adenoma       Past Surgical History:   Procedure Laterality Date    CYST REMOVAL      EGD      EXAMINATION UNDER ANESTHESIA N/A 2020    Procedure: DRUG INDUCED SLEEP ENDOSCOPY;  Surgeon: Domnick Mortimer, MD;  Location: [facility];  Service: ENT    LASIK         Family History   Problem Relation Age of Onset    Cancer Mother         ovarian and stomach    Coronary artery disease Mother     Coronary artery disease Father     Cancer Father         pancratic       Social History     Socioeconomic History    Marital status: /Civil Union     Spouse name: Not on file    Number of children: Not on file    Years of education: Not on file    Highest education level: Not on file   Occupational History    Not on file   Tobacco Use    Smoking status: Former     Types: Cigarettes     Quit date:      Years since quittin.8    Smokeless tobacco: Never   Vaping Use    Vaping Use: Never used   Substance and Sexual Activity    Alcohol use: Yes     Comment: socially    Drug use: Never    Sexual activity: Yes     Partners: Female   Other Topics Concern    Not on file   Social History Narrative    Not on file     Social Determinants of Health     Financial Resource Strain: Not on file   Food Insecurity: Not on file   Transportation Needs: Not on file   Physical Activity: Not on file   Stress: Not on file   Social Connections: Not on file   Intimate Partner Violence: Not on file   Housing Stability: Not on file       Allergies   Allergen Reactions    Shellfish-Derived Products - Food Allergy Anaphylaxis, Dizziness and Lightheadedness    Medical Tape Itching and Rash         Current Outpatient Medications:     aspirin 81 mg chewable tablet, Chew 1 tablet (81 mg total) daily, Disp: , Rfl:     Crisaborole (Eucrisa) 2 % OINT, APPLY TOPICALLY TWICE A DAY MONDAY THROUGH FRIDAY TO TRUNK AND EXTREMITIES, Disp: 100 g, Rfl: 14    FLUoxetine (PROzac) 40 MG capsule, TAKE 1 CAPSULE DAILY AT BEDTIME, Disp: 90 capsule, Rfl: 3    hydrOXYzine HCL (ATARAX) 25 mg tablet, Take 1 tablet (25 mg total) by mouth daily at bedtime, Disp: 90 tablet, Rfl: 3    multivitamin (THERAGRAN) TABS, Take 1 tablet by mouth daily, Disp: , Rfl:     pantoprazole (PROTONIX) 40 mg tablet, Take 1 tablet (40 mg total) by mouth daily before breakfast, Disp: 90 tablet, Rfl: 2    rosuvastatin (CRESTOR) 5 mg tablet, TAKE 1 TABLET DAILY AT BEDTIME, Disp: 90 tablet, Rfl: 3

## 2023-10-16 NOTE — PATIENT INSTRUCTIONS
Abdominal iliac ultrasound in 1 year with return office visit  Continue daily aspirin  Blood pressure control    Nonruptured Abdominal Aortic Aneurysm   AMBULATORY CARE:   An abdominal aortic aneurysm (AAA)  is a bulging or weak area in your abdominal aorta. Over time, the bulge may grow and is at risk for tearing or rupturing (bursting). The aorta is a large blood vessel that extends from your heart to your abdomen. The part of the aorta that extends into your abdomen is called your abdominal aorta. Your abdominal aorta brings blood to your stomach, pelvis, and legs. Treatment may be needed so your aneurysm does not grow and rupture. An AAA rupture is a life-threatening emergency. Signs and symptoms: An AAA usually does not have signs or symptoms if it has not ruptured. If the AAA starts to leak or ruptures, you may have any of the following:  Sudden pain in your abdomen, groin, back, legs, or buttocks    A lump or swelling in your abdomen    Nausea and vomiting    Stiff abdominal muscles    Numbness or tingling in your legs    Pale, sweaty, or clammy skin    Dizziness, fainting or loss of consciousness    Call your local emergency number (911 in the [place]), or have someone else call if:   You have any of the following signs of a stroke:      Numbness or drooping on one side of your face     Weakness in an arm or leg    Confusion or difficulty speaking    Dizziness, a severe headache, or vision loss             You faint or lose consciousness. You cannot be woken. Seek care immediately if:   You have sudden sharp pain in your abdomen, groin, back, legs, or buttocks. You have nausea and vomiting. You feel dizzy. You have stiffness or swelling in your abdomen, or a lump in your abdomen. You have numbness or tingling in your legs. Your skin is pale, sweaty, or clammy. Call your doctor if:   You have questions or concerns about your condition or care. Treatment for an AAA  may not be needed. Your healthcare provider may monitor the size of your AAA with tests, such as an ultrasound. If your AAA gets bigger, starts to leak, or ruptures, you may need any of the following:  Medicines  may be needed to lower your blood pressure (BP) or cholesterol level. Endovascular repair  is a procedure that uses a graft to repair your AAA. The graft stops blood flow to the aneurysm and protects your abdominal aorta. You may need to have more than 1 endovascular repair. Open repair  is surgery to repair or remove an AAA. Manage a nonruptured AAA:   Do not use nicotine products or stimulating drugs. Nicotine and drugs such as cocaine may raise your BP, damage your aorta, or make your AAA larger. Ask your provider for information if you currently smoke or use stimulating drugs and need help to quit. E- cigarettes or smokeless tobacco still contain nicotine. Do not use these in place of cigarettes. Avoid secondhand smoke. Manage other health conditions that can cause or worsen an AAA. Follow your treatment plan to manage conditions such as high cholesterol, obesity, or stress. Check your BP as directed if you have high BP. Your provider will show you how to do this. Check your BP 2 times, 1 minute apart. Check as often as directed each day. Keep a record of your readings and bring it to your follow-up visits. Follow physical activity directions. Your healthcare provider may help you create a physical activity plan. Your plan may include low-intensity activity, such as walking, yoga, or swimming. Do not  lift anything heavier than 10 pounds. You may also need to avoid intense physical activity, such as running. Heavy lifting or intense activity may raise your BP or put pressure on your aorta. These increase your risk for a tear or rupture. Follow the meal plan recommended by your provider. Talk to your provider or dietitian about a heart-healthy or low-sodium meal plan.  A meal plan may help you lower your cholesterol or BP levels. Heart-healthy meal plans are low in sodium, processed sugar, and some fats. They are high in potassium, calcium, heart-healthy fats, and fiber. These can be found in vegetables, fruit, and whole-grain foods. Know the risks if you choose to drink alcohol. Alcohol can increase your BP. Ask your provider if it is okay for you to drink any alcohol. Your provider can help you set limits for the number of drinks you have in 24 hours and within 1 week. A drink of alcohol is 12 ounces of beer, 5 ounces of wine, or 1½ ounces of liquor. Get vaccines as directed. Some viruses can worsen an AAA. Get an influenza (flu) vaccine as soon as recommended each year, usually in September or October. Get all recommended COVID-19 vaccine doses and boosters. A pneumonia vaccine may also be recommended. Your provider will tell you if you need other vaccines, and when to get them. What you need to know about family planning:   Before you try to get pregnant,  talk to your specialist about family planning. Genetic counseling may be needed if your AAA is caused by a condition that can be passed to your baby. You may need counseling if your condition can put you or your pregnancy at risk. You may instead need counseling about the risk of pregnancy making your AAA grow, tear, or rupture. You may also need tests to check your AAA. These tests can help you and your provider plan treatment before and during your pregnancy. You may need surgery to protect your aorta and prevent your AAA from growing, tearing, or rupturing. While you are pregnant,  you may have 1 or more specialist caring for you. You may need medicine to help lower your BP. You may need regular tests to check your AAA. You may need other medicines or surgery if your AAA grows, tears, or ruptures. Your baby may need to be born early if your aorta tears or ruptures.  A  section () may be needed if you have a history of a tear in your aorta. You may need a  if you have a high risk for a ruptured aneurysm or tear. After your baby is born,  you may need tests to check your AAA. Your risk for a ruptured aneurysm or tear is high for up to 12 weeks. What you need to know about screening for an AAA:  Your healthcare provider can give you specific information about your screening. The following is general information:  Screening means you are checked for an AAA even if you have no signs or symptoms. Screening is recommended if you are male, aged 72 to 76 years, and you ever smoked cigarettes. Screening may be recommended based on your family history and other personal risk factors. Your parents, siblings, and children may need screening. More family members may also need screening if 2 or more people in your family had an AAA. Your provider will tell you which family members need screening. The benefit of screening is that your provider can diagnose an AAA early. This helps your provider monitor your AAA and start treatment, if needed. This can help prevent your AAA from growing, rupturing, or tearing. Screening can also help you plan a healthy pregnancy. Follow up with your doctor as directed: You will need regular tests to monitor the size of your AAA. Your doctor will tell you how often to have the tests. Keep all appointments. Write down your questions so you remember to ask them during your visits. © Copyright [employer]  Information is for End User's use only and may not be sold, redistributed or otherwise used for commercial purposes. The above information is an  only. It is not intended as medical advice for individual conditions or treatments. Talk to your doctor, nurse or pharmacist before following any medical regimen to see if it is safe and effective for you.

## 2023-10-16 NOTE — ASSESSMENT & PLAN NOTE
38-year-old male, former smoker with HLD, CORI, and infrarenal AAA presents to review yearly imaging and [facility] visit. -Presents without complaints. Denies abdominal, back, or leg pain  -BP well controlled  -No known family history of AAA  -2 children in their 25s  -Palpable distal pulses bilaterally    AOIL 8/9/23- increase in AAA  There is an infrarenal fusiform abdominal aortic aneurysm measuring 3.5 x 3.8cm (Prior 3.4 x 2.9 cm)  The left common iliac artery is ectatic measuring 1.7 cm. The right common iliac and bilateral external iliac arteries are patent and normal in caliber bilaterally. The celiac and superior mesenteric arteries are patent. The proximal renal arteries are patent.     Plan:  -AOIL in 1 year with return office visit  -Continue daily aspirin  -BP control  -Call return to office with new or worsening symptoms, questions or concerns.  -Educated on screening 1st degree relatives at age of 46

## 2023-12-15 DIAGNOSIS — R12 HEART BURN: ICD-10-CM

## 2023-12-15 RX ORDER — PANTOPRAZOLE SODIUM 40 MG/1
40 TABLET, DELAYED RELEASE ORAL
Qty: 90 TABLET | Refills: 3 | Status: SHIPPED | OUTPATIENT
Start: 2023-12-15

## 2024-02-12 ENCOUNTER — APPOINTMENT (OUTPATIENT)
Dept: RADIOLOGY | Facility: CLINIC | Age: 59
End: 2024-02-12
Payer: COMMERCIAL

## 2024-02-12 ENCOUNTER — OFFICE VISIT (OUTPATIENT)
Dept: OBGYN CLINIC | Facility: CLINIC | Age: 59
End: 2024-02-12
Payer: COMMERCIAL

## 2024-02-12 VITALS
SYSTOLIC BLOOD PRESSURE: 135 MMHG | WEIGHT: 260 LBS | BODY MASS INDEX: 33.38 KG/M2 | HEART RATE: 60 BPM | DIASTOLIC BLOOD PRESSURE: 83 MMHG

## 2024-02-12 DIAGNOSIS — M54.42 LOW BACK PAIN WITH BILATERAL SCIATICA, UNSPECIFIED BACK PAIN LATERALITY, UNSPECIFIED CHRONICITY: ICD-10-CM

## 2024-02-12 DIAGNOSIS — G89.29 CHRONIC MIDLINE LOW BACK PAIN WITHOUT SCIATICA: Primary | ICD-10-CM

## 2024-02-12 DIAGNOSIS — M54.50 CHRONIC MIDLINE LOW BACK PAIN WITHOUT SCIATICA: Primary | ICD-10-CM

## 2024-02-12 DIAGNOSIS — M54.41 LOW BACK PAIN WITH BILATERAL SCIATICA, UNSPECIFIED BACK PAIN LATERALITY, UNSPECIFIED CHRONICITY: ICD-10-CM

## 2024-02-12 PROCEDURE — 99203 OFFICE O/P NEW LOW 30 MIN: CPT | Performed by: ORTHOPAEDIC SURGERY

## 2024-02-12 PROCEDURE — 72110 X-RAY EXAM L-2 SPINE 4/>VWS: CPT

## 2024-02-12 NOTE — PROGRESS NOTES
[facility] ORTHOPEDIC SPINE SURGERY  DR.AMIR JORDAN MD  [address] [facility] [address] [phone]    HISTORY OF PRESENT ILLNESS:    Hammad Meraz is a 58 y.o. male who presents low back pain for past 20 years, started getting worse past couple months. No specific injury or trauma. Pain is midline, radiates into right gluteal region. Used to get radicular pain down whole leg but not recently. Pain wakes him up at night. Has taken aleve, tylenol without significant relief. Pain is intermittent. Pain is described as aching and sharp. No PT done for this yet.      ALLERGIES:   Allergies   Allergen Reactions    Shellfish-Derived Products - Food Allergy Anaphylaxis, Dizziness and Lightheadedness    Medical Tape Itching and Rash       MEDICATIONS:    Current Outpatient Medications:     aspirin 81 mg chewable tablet, Chew 1 tablet (81 mg total) daily, Disp: , Rfl:     Crisaborole (Eucrisa) 2 % OINT, APPLY TOPICALLY TWICE A DAY MONDAY THROUGH FRIDAY TO TRUNK AND EXTREMITIES, Disp: 100 g, Rfl: 14    FLUoxetine (PROzac) 40 MG capsule, TAKE 1 CAPSULE DAILY AT BEDTIME, Disp: 90 capsule, Rfl: 3    hydrOXYzine HCL (ATARAX) 25 mg tablet, Take 1 tablet (25 mg total) by mouth daily at bedtime, Disp: 90 tablet, Rfl: 3    multivitamin (THERAGRAN) TABS, Take 1 tablet by mouth daily, Disp: , Rfl:     pantoprazole (PROTONIX) 40 mg tablet, TAKE 1 TABLET DAILY BEFORE BREAKFAST, Disp: 90 tablet, Rfl: 3    rosuvastatin (CRESTOR) 5 mg tablet, TAKE 1 TABLET DAILY AT BEDTIME, Disp: 90 tablet, Rfl: 3     PAST MEDICAL HISTORY:   Past Medical History:   Diagnosis Date    Allergic     Anxiety     Eczema     Gastric ulcer     Hypercholesterolemia     Impacted cerumen of left ear 02/27/2019    Sleep apnea        PAST SURGICAL HISTORY:  Past Surgical History:   Procedure Laterality Date    CYST REMOVAL      EGD      EXAMINATION UNDER ANESTHESIA N/A 9/24/2020    Procedure: DRUG INDUCED SLEEP ENDOSCOPY;  Surgeon: Liang Cramer MD;   Location: [facility];  Service: ENT    LASIK         SOCIAL HISTORY:  Social History     Tobacco Use   Smoking Status Former    Current packs/day: 0.00    Types: Cigarettes    Quit date:     Years since quittin.1   Smokeless Tobacco Never          PHYSICAL EXAM:  58 y.o. male sitting comfortably on exam chair in no apparent distress.   Skin intact over lumbar spine no erythema  TTP over lumbar spine midline, no ttp over R greater troch  Negative Stinchfield, positive Janice with pain in the back  Sensation intact to light touch L3-S1 bilaterally  Motor 5 out of 5 bilateral lower extremities  Lower extremities are warm well-perfused      RADIOGRAPHIC STUDIES:  XR lumbar spine done today shows no misalignment, there is no significant DDD, small facet arthropathy as expected per his age.  No acute fracture.      ASSESSMENT:  1. Chronic midline low back pain without sciatica  -     XR spine lumbar minimum 4 views non injury; Future; Expected date: 2024  -     Ambulatory Referral to Comprehensive Spine Program; Future        PLAN:  58 y.o. male with chronic midline low back pain without sciatica.  He will be referred to comprehensive spine PT program.  Explained the patient there is no orthopedic spine surgery indicated for him at this time.  If he develops worsening pain or radicular symptoms in the future, he is welcome to come back for repeat evaluation otherwise we expect that he will do well with physical therapy alone.  He may continue to take over-the-counter antiinflammatories or Tylenol for pain relief.  Also suggested that he could try a chiropractor if he felt comfortable with that.         Scribe Attestation      I,:  Walter Ken PA-C am acting as a scribe while in the presence of the attending physician.:       I,:  Johnny Torres MD personally performed the services described in this documentation    as scribed in my presence.:

## 2024-02-26 ENCOUNTER — EVALUATION (OUTPATIENT)
Dept: PHYSICAL THERAPY | Facility: CLINIC | Age: 59
End: 2024-02-26
Payer: COMMERCIAL

## 2024-02-26 DIAGNOSIS — M54.50 CHRONIC MIDLINE LOW BACK PAIN WITHOUT SCIATICA: Primary | ICD-10-CM

## 2024-02-26 DIAGNOSIS — G89.29 CHRONIC MIDLINE LOW BACK PAIN WITHOUT SCIATICA: Primary | ICD-10-CM

## 2024-02-26 PROCEDURE — 97112 NEUROMUSCULAR REEDUCATION: CPT | Performed by: PHYSICAL THERAPIST

## 2024-02-26 PROCEDURE — 97140 MANUAL THERAPY 1/> REGIONS: CPT | Performed by: PHYSICAL THERAPIST

## 2024-02-26 PROCEDURE — 97161 PT EVAL LOW COMPLEX 20 MIN: CPT | Performed by: PHYSICAL THERAPIST

## 2024-02-26 NOTE — PROGRESS NOTES
PT Evaluation     Today's date: 24  Patient name: Hammad Meraz  : 1965  MRN: [id number]  Referring provider: Walter Ken PA-C  Dx:   Encounter Diagnosis     ICD-10-CM    1. Chronic midline low back pain without sciatica  M54.50     G89.29                       Assessment  Assessment details: Hammad Sprague a 58 y.o. male referred with primary diagnosis of Chronic midline low back pain without sciatica  (primary encounter diagnosis) .  Patient presents with the following functional limitations:  Pain with prolonged driving, end of day, playing golf.  Patient demonstrates poor postural awareness, significant limitations in lumbar ROM, decreased segmental mobility of the lumbar spine, and poor flexibility of the LE musculature.  Trial of mobilizations significantly improved lumbar ROM and decreased pain with motion.  Treatment to include: Manual therapy techniques, extremity/core strengthening, neuromuscular control exercises, instruction in a comprehensive HEP, and modalities as needed.  They will benefit from skilled PT services to address the above functional deficits and to decrease pain to promote a return to their premorbid level of function.    Impairments: abnormal or restricted ROM, activity intolerance, impaired physical strength, lacks appropriate home exercise program, pain with function, poor posture  and poor body mechanics  Functional limitations: Pain with prolonged driving, end of day, playing golf.Understanding of Dx/Px/POC: good   Prognosis: fair    Goals  STG (4 weeks)  1. Patient will demonstrate the ability to correct posture with minimal VC's  2. Patient will demonstrate 25% gains in Hip ROM in all deficient planes  3. Patient will report pain as a 4-5/10 at worst with all normal activities  4. Patient will report 50% reduction in sleep disturbances related to pain  LTG (8 weeks)  1. Patient will report pain as a 0-1/10 at worst with normal activities  2. Patient  "will sleep through the night without disturbances related to pain  3. Patient will demonstrate Hip ROM WNL to facilitate improved quality of gait  4. Patient will demonstrate Hip strength WNL to improve improve transfers, quality of gait, and ability to negotiate stairs.  5. Patient will be independent and compliant with a HEP in order to maintain gains made with skilled PT services.      Plan  Patient would benefit from: skilled physical therapy  Planned modality interventions: thermotherapy: hydrocollator packs, cryotherapy and electrical stimulation/Russian stimulation  Planned therapy interventions: abdominal trunk stabilization, joint mobilization, IASTM, manual therapy, neuromuscular re-education, patient education, strengthening, stretching, therapeutic activities, therapeutic exercise and home exercise program  Frequency: 2x week  Duration in weeks: 8  Plan of Care beginning date: 2024  Plan of Care expiration date: 2024  Treatment plan discussed with: patient    Subjective Evaluation    History of Present Illness  Mechanism of injury: Hammad Meraz is a 58 y.o. male who presents low back pain for past 20 years, started getting worse past couple months. No specific injury or trauma. Pain is midline, radiates into right gluteal region. Used to get radicular pain down whole leg but not recently. Pain wakes him up at night.  Saw Orthopedic surgeon who ordered x-rays.  X-rays show, \"Age-appropriate lumbar degenerative changes.\"  Patient referred now to outpatient PT services.  Patient Goals  Patient goals for therapy: decreased pain    Pain  Current pain ratin  At best pain ratin (Was last pain-free a couple of months ago)  At worst pain ratin  Location: Central lower back L1-5 and occasionally into right buttock.  Quality: pressure and squeezing  Relieving factors: heat (longer walk.  First OOB in the AM)  Exacerbated by: toward end of the day.  Golfing.  Hiking hills.  Standing " stationary for a couple of minutes.  Lying on right side.  Turning over in bed.  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  In the spring walks 30-60 minutes daily with dogs.      Diagnostic Tests    FCE comments: Right gluteal pain with lying on right side and driving more than 3-4 hours.  Denies weakness into bilateral LE  Denies paresthesias into bilateral LE  Denies saddle anesthesias  (+) cough/sneeze for lower back pain.  (+) sleep disturbances 2-3x/night  Has lost 35# over the past 5 months.Treatments  Previous treatment: chiropractic and medication  Current treatment: medication      Objective     Static Posture     Thoracic Spine  Hyperkyphosis.    Lumbar Spine   Flattened.     Tenderness     Lumbar Spine  No tenderness in the left transverse process and right transverse process. Spinous process tenderness: L3-5.  L3-4 referred pain to right buttock.    Left Hip   No tenderness in the ASIS and PSIS.     Right Hip   No tenderness in the ASIS and PSIS.     Active Range of Motion     Lumbar   Flexion:  with pain Restriction level: maximal  Extension:  Restriction level: moderate  Left rotation:  with pain Restriction level: moderate  Right rotation:  with pain Restriction level: moderate    Additional Active Range of Motion Details  Right rotation more painful than left    Joint Play     Hypomobile: L1, L2, L3, L4 and L5     Pain: L3, L4 and L5   Mechanical Assessment    Cervical      Thoracic      Lumbar    Standing flexion: repeated movements   Pain intensity: worse  Pain level: increased  Standing extension: repeated movements  Pain intensity: better  Pain level: decreased    Strength/Myotome Testing     Left Hip   Planes of Motion   Flexion: 4+  Extension: 4+  Abduction: 4+    Right Hip   Planes of  Motion   Flexion: 4+  Extension: 4+  Abduction: 4+    Left Knee   Extension: 5    Right Knee   Extension: 5    Left Ankle/Foot   Dorsiflexion: 5  Great toe flexion: 4+    Right Ankle/Foot   Dorsiflexion: 5  Great toe flexion: 4+    Tests     Lumbar   Negative SIJ compression, sacroiliac distraction and sacral spring .     Left   Positive passive SLR.   Negative quadrant and slump test.     Right   Positive passive SLR.   Negative quadrant and slump test.     Left Hip   Negative KATIE and FADIR.     Right Hip   Positive KATEI.   Negative FADIR.     Additional Tests Details  (+) HS, hip flexor and quadriceps tightness gabriel.         Precautions: Anxiety, Chronic back pain    POC expires Unit limit Auth  expiration date PT/OT + Visit Limit?   4/22/*24 NA 12/31/24 12                 Visit/Unit Tracking  AUTH Status:  Date 2/26              Authorized Used 1               Remaining  11                    Manuals 2/26            CPA mobs L3-5 JF grade III-IV            Rotation mobs L and R SL JF grade III            Left UPA mobs  NV L3-5                         Neuro Re-Ed             Pt education L-spine anatomy and pathology.  Centralization vs Peripheralization            Webslide rows M,L             T-band Paloff Press             Bridges             SL clamshells             Qped UE raise             Qped LE raise             Ther Ex             Nustep with UE             HS stretch gabriel             Standing hip flexor stretch gabriel             Supine LTR             Prone press-ups Trial NV                                                   Ther Activity                                       Gait Training                                       Modalities

## 2024-02-29 ENCOUNTER — OFFICE VISIT (OUTPATIENT)
Dept: PHYSICAL THERAPY | Facility: CLINIC | Age: 59
End: 2024-02-29
Payer: COMMERCIAL

## 2024-02-29 DIAGNOSIS — M54.50 CHRONIC MIDLINE LOW BACK PAIN WITHOUT SCIATICA: Primary | ICD-10-CM

## 2024-02-29 DIAGNOSIS — G89.29 CHRONIC MIDLINE LOW BACK PAIN WITHOUT SCIATICA: Primary | ICD-10-CM

## 2024-02-29 PROCEDURE — 97112 NEUROMUSCULAR REEDUCATION: CPT | Performed by: PHYSICAL THERAPIST

## 2024-02-29 PROCEDURE — 97140 MANUAL THERAPY 1/> REGIONS: CPT | Performed by: PHYSICAL THERAPIST

## 2024-02-29 NOTE — PROGRESS NOTES
"Daily Note     Today's date: 2024  Patient name: Hammad Meraz  : 1965  MRN: [id number]  Referring provider: Walter Ken PA-C  Dx:   Encounter Diagnosis     ICD-10-CM    1. Chronic midline low back pain without sciatica  M54.50     G89.29           Start Time: 1630  Stop Time: 1714  Total time in clinic (min): 44 minutes    Subjective: Patient reports that he had increased pain after his last visit, but today it feels better.      Objective: See treatment diary below      Assessment: Tolerated treatment well. Patient demonstrated fatigue post treatment and would benefit from continued PT. Patient reports that he felt like he had a workout following his treatment session. Mild increase in low back soreness following prone press ups.      Plan: Continue per plan of care.  Progress treatment as tolerated.       Precautions: Anxiety, Chronic back pain    POC expires Unit limit Auth  expiration date PT/OT + Visit Limit?   /*24 NA 24 12                 Visit/Unit Tracking  AUTH Status:  Date              Authorized Used 1 2              Remaining  11 10                   Manuals            CPA mobs L3-5 JF grade III-IV            Rotation mobs L and R SL JF grade III            Left UPA mobs  NV L3-5                         Neuro Re-Ed             Pt education L-spine anatomy and pathology.  Centralization vs Peripheralization            Webslide rows M,L  BTB 2x10 3\" ea.           T-band Paloff Press  BTB 10x10\" ea.             Bridges             SL clamshells             Qped UE raise             Qped LE raise               Ther Ex             Nustep with UE  L7 10 min           HS stretch gabriel  Supine 3x30\" b/l           Standing hip flexor stretch gabriel  3x30\" b/l           Supine LTR             Prone press-ups Trial NV 2x10 3\"                                                  Ther Activity                                       Gait Training                        "                Modalities

## 2024-03-04 ENCOUNTER — OFFICE VISIT (OUTPATIENT)
Dept: PHYSICAL THERAPY | Facility: CLINIC | Age: 59
End: 2024-03-04
Payer: COMMERCIAL

## 2024-03-04 DIAGNOSIS — G89.29 CHRONIC MIDLINE LOW BACK PAIN WITHOUT SCIATICA: Primary | ICD-10-CM

## 2024-03-04 DIAGNOSIS — M54.50 CHRONIC MIDLINE LOW BACK PAIN WITHOUT SCIATICA: Primary | ICD-10-CM

## 2024-03-04 PROCEDURE — 97112 NEUROMUSCULAR REEDUCATION: CPT | Performed by: PHYSICAL THERAPIST

## 2024-03-04 PROCEDURE — 97110 THERAPEUTIC EXERCISES: CPT | Performed by: PHYSICAL THERAPIST

## 2024-03-04 NOTE — PROGRESS NOTES
"Daily Note     Today's date: 3/4/2024  Patient name: Hammad Meraz  : 1965  MRN: [id number]  Referring provider: Walter Ken PA-C  Dx:   Encounter Diagnosis     ICD-10-CM    1. Chronic midline low back pain without sciatica  M54.50     G89.29                      Subjective: No changes in symptoms after last PT session.  No complaints of increased pain.      Objective: See treatment diary below      Assessment: Tolerated treatment well. Patient demonstrated fatigue post treatment and would benefit from continued PT.  Reviewed TE and provided with WHEP and T-band for home use.  Felt stiff after manual therapy techniques.      Plan: Continue per plan of care.  Progress treatment as tolerated.       Precautions: Anxiety, Chronic back pain  Access Code: [email or web address]  URL: [email or web address]  Date: 2024  Prepared by: Adrian Sosa  POC expires Unit limit Auth  expiration date PT/OT + Visit Limit?   /24 NA 24 12                 Visit/Unit Tracking  AUTH Status:  Date 2/26 2/29 3/4            Authorized Used 1 2 3             Remaining  11 10 9                  Manuals 2/26 2/29 3/4          CPA mobs L3-5 JF grade III-IV  JF grade III          Rotation mobs L and R SL JF grade III            Left UPA mobs  NV L3-5  JF L3-5 grade III                       Neuro Re-Ed             Pt education L-spine anatomy and pathology.  Centralization vs Peripheralization            Webslide rows M,L  BTB 2x10 3\" ea. BTB 3x10 3\" ea. Black         T-band Paloff Press  BTB 10x10\" ea. BTB 10x10\" ea.            Bridges   NV          SL clamshells   NV          Qped UE raise   NV          Qped LE raise   NV            Ther Ex             Nustep with UE  L7 10 min L7 10 min          HS stretch gabriel  Supine 3x30\" b/l Step 3x30\"          Standing hip flexor stretch gabriel  3x30\" b/l 3x30\" b/l          Supine LTR             Prone press-ups Trial NV 2x10 3\"                                              "     Ther Activity                                       Gait Training                                       Modalities

## 2024-03-11 ENCOUNTER — OFFICE VISIT (OUTPATIENT)
Dept: PHYSICAL THERAPY | Facility: CLINIC | Age: 59
End: 2024-03-11
Payer: COMMERCIAL

## 2024-03-11 DIAGNOSIS — M54.50 CHRONIC MIDLINE LOW BACK PAIN WITHOUT SCIATICA: Primary | ICD-10-CM

## 2024-03-11 DIAGNOSIS — G89.29 CHRONIC MIDLINE LOW BACK PAIN WITHOUT SCIATICA: Primary | ICD-10-CM

## 2024-03-11 PROCEDURE — 97112 NEUROMUSCULAR REEDUCATION: CPT | Performed by: PHYSICAL THERAPIST

## 2024-03-11 PROCEDURE — 97140 MANUAL THERAPY 1/> REGIONS: CPT | Performed by: PHYSICAL THERAPIST

## 2024-03-11 PROCEDURE — 97110 THERAPEUTIC EXERCISES: CPT | Performed by: PHYSICAL THERAPIST

## 2024-03-11 NOTE — PROGRESS NOTES
"Daily Note     Today's date: 3/11/2024  Patient name: Hammad Meraz  : 1965  MRN: [id number]  Referring provider: Walter Ken PA-C  Dx:   Encounter Diagnosis     ICD-10-CM    1. Chronic midline low back pain without sciatica  M54.50     G89.29           Start Time: 1815  Stop Time: 1900  Total time in clinic (min): 45 minutes    Subjective: Patient reports that his lower back pain seems to be gradually improving.      Objective: See treatment diary below      Assessment: Tolerated treatment well. Patient demonstrated fatigue post treatment and would benefit from continued PT. Patient demonstrated good technique with progressions. Patient overall continues to report gradual progress per POC. Patient did report mild to moderate discomfort with lumbar CPA mobs.      Plan: Continue per plan of care.  Progress treatment as tolerated.       Precautions: Anxiety, Chronic back pain  Access Code: [email or web address]  URL: [email or web address]  Date: 2024  Prepared by: Adrian Sosa  POC expires Unit limit Auth  expiration date PT/OT + Visit Limit?   /*24 NA 24 12                 Visit/Unit Tracking  AUTH Status:  Date 2/26 2/29 3/4 3/11           Authorized Used 1 2 3 4            Remaining  11 10 9 8                 Manuals 2/26 2/29 3/4 3/11         CPA mobs L3-5 JF grade III-IV  JF grade III GR Gr. III-IV         Rotation mobs L and R SL JF grade III            Left UPA mobs  NV L3-5  JF L3-5 grade III GR Gr. III-IV                      Neuro Re-Ed             Pt education L-spine anatomy and pathology.  Centralization vs Peripheralization            Webslide rows M,L  BTB 2x10 3\" ea. BTB 3x10 3\" ea. Black         T-band Paloff Press  BTB 10x10\" ea. BTB 10x10\" ea.            Bridges   NV 2x10 5\"         SL clamshells   NV RTB 2x10 5\" b/l         Qped UE raise   NV 15x5\"         Qped LE raise   NV 15x5\"           Ther Ex             Nustep with UE  L7 10 min L7 10 min L7 10 min       " "  HS stretch gabriel  Supine 3x30\" b/l Step 3x30\"          Standing hip flexor stretch gabriel  3x30\" b/l 3x30\" b/l          Supine LTR             Prone press-ups Trial NV 2x10 3\"                                                  Ther Activity                                       Gait Training                                       Modalities                                                "

## 2024-03-18 ENCOUNTER — OFFICE VISIT (OUTPATIENT)
Dept: PHYSICAL THERAPY | Facility: CLINIC | Age: 59
End: 2024-03-18
Payer: COMMERCIAL

## 2024-03-18 DIAGNOSIS — M54.50 CHRONIC MIDLINE LOW BACK PAIN WITHOUT SCIATICA: Primary | ICD-10-CM

## 2024-03-18 DIAGNOSIS — G89.29 CHRONIC MIDLINE LOW BACK PAIN WITHOUT SCIATICA: Primary | ICD-10-CM

## 2024-03-18 PROCEDURE — 97110 THERAPEUTIC EXERCISES: CPT | Performed by: PHYSICAL THERAPIST

## 2024-03-18 PROCEDURE — 97112 NEUROMUSCULAR REEDUCATION: CPT | Performed by: PHYSICAL THERAPIST

## 2024-03-18 NOTE — PROGRESS NOTES
"Daily Note     Today's date: 3/18/2024  Patient name: Hammad Meraz  : 1965  MRN: [id number]  Referring provider: Walter Ken PA-C  Dx:   Encounter Diagnosis     ICD-10-CM    1. Chronic midline low back pain without sciatica  M54.50     G89.29                      Subjective: Patient states his back continues to feel better.  Frequency and intensity of symptoms are both improved.  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      Objective: See treatment diary below      Assessment: Tolerated treatment well. Patient demonstrated fatigue post treatment and would benefit from continued PT.  Patient reports feeling stiff when getting up from treatment table after mobilizations, but has no residual soreness once moving around.  Joint mobility is improving and patient states he feels more flexible when working around the house.      Plan: Continue per plan of care.      Precautions: Anxiety, Chronic back pain  Access Code: [email or web address]  URL: [email or web address]  Date: 2024  Prepared by: Adrian Sosa  POC expires Unit limit Auth  expiration date PT/OT + Visit Limit?   /*24 NA 24 12                 Visit/Unit Tracking  AUTH Status:  Date 2/26 2/29 3/4 3/11           Authorized Used 1 2 3 4            Remaining  11 10 9 8                 Manuals 2/26 2/29 3/4 3/11 3/18        CPA mobs L3-5 JF grade III-IV  JF grade III GR Gr. III-IV GR Gr. III-IV        Rotation mobs L and R SL JF grade III            Left UPA mobs  NV L3-5  JF L3-5 grade III GR Gr. III-IV GR Gr. III-IV                     Neuro Re-Ed             Pt education L-spine anatomy and pathology.  Centralization vs Peripheralization            Webslide rows M,L  BTB 2x10 3\" ea. BTB 3x10 3\" ea. Black Black 3x10        T-band Paloff Press  BTB 10x10\" ea. BTB 10x10\" ea.  Black 10\"x10          Bridges   NV 2x10 5\" RTB 5\"x30        SL clamshells   NV RTB 2x10 5\" b/l RTB 2x10 5\" b/l        Qped " "UE raise   NV 15x5\"         Qped LE raise   NV 15x5\"           Ther Ex             Nustep with UE  L7 10 min L7 10 min L7 10 min L7 10 min        HS stretch gabriel  Supine 3x30\" b/l Step 3x30\"          Standing hip flexor stretch gabriel  3x30\" b/l 3x30\" b/l          Supine LTR             Prone press-ups Trial NV 2x10 3\"                                                  Ther Activity                                       Gait Training                                       Modalities                                                  "

## 2024-03-25 ENCOUNTER — OFFICE VISIT (OUTPATIENT)
Dept: PHYSICAL THERAPY | Facility: CLINIC | Age: 59
End: 2024-03-25
Payer: COMMERCIAL

## 2024-03-25 DIAGNOSIS — M54.50 CHRONIC MIDLINE LOW BACK PAIN WITHOUT SCIATICA: Primary | ICD-10-CM

## 2024-03-25 DIAGNOSIS — G89.29 CHRONIC MIDLINE LOW BACK PAIN WITHOUT SCIATICA: Primary | ICD-10-CM

## 2024-03-25 PROCEDURE — 97112 NEUROMUSCULAR REEDUCATION: CPT | Performed by: PHYSICAL THERAPIST

## 2024-03-25 PROCEDURE — 97110 THERAPEUTIC EXERCISES: CPT | Performed by: PHYSICAL THERAPIST

## 2024-03-25 NOTE — PROGRESS NOTES
"Daily Note     Today's date: 3/25/2024  Patient name: Hammad Meraz  : 1965  MRN: [id number]  Referring provider: Walter Ken PA-C  Dx:   Encounter Diagnosis     ICD-10-CM    1. Chronic midline low back pain without sciatica  M54.50     G89.29                      Subjective: Patient states his lower back is feeling better.      Objective: See treatment diary below      Assessment: Tolerated treatment well. Patient demonstrated fatigue post treatment and would benefit from continued PT.  Patient felt good after session.  Back feels more loose after manual techniques      Plan: Continue per plan of care.  Progress treatment as tolerated.       Precautions: Anxiety, Chronic back pain  Access Code: [email or web address]  URL: [email or web address]  Date: 2024  Prepared by: Adrian Sosa  POC expires Unit limit Auth  expiration date PT/OT + Visit Limit?   /24 NA 24 12                 Visit/Unit Tracking  AUTH Status:  Date 2/26 2/29 3/4 3/11 3/18          Authorized Used 1 2 3 4 5           Remaining  11 10 9 8 7                Manuals 2/26 2/29 3/4 3/11 3/18 3/25       CPA mobs L3-5 JF grade III-IV  JF grade III GR Gr. III-IV GR Gr. III-IV GR Gr. III-IV       Rotation mobs L and R SL JF grade III            Left UPA mobs  NV L3-5  JF L3-5 grade III GR Gr. III-IV GR Gr. III-IV GR Gr. III-IV                    Neuro Re-Ed             Pt education L-spine anatomy and pathology.  Centralization vs Peripheralization            Webslide rows M,L  BTB 2x10 3\" ea. BTB 3x10 3\" ea. Black Black 3x10 Black 3x10       T-band Paloff Press  BTB 10x10\" ea. BTB 10x10\" ea.  Black 10\"x10   Black 10\"x10       Bridges   NV 2x10 5\" RTB 5\"x30 RTB 5\"x30       SL clamshells   NV RTB 2x10 5\" b/l RTB 2x10 5\" b/l RTB 2x10 5\" b/l       Qped UE raise   NV 15x5\"         Qped LE raise   NV 15x5\"           Ther Ex             Nustep with UE  L7 10 min L7 10 min L7 10 min L7 10 min L7 10 min       HS stretch gabriel  " "Supine 3x30\" b/l Step 3x30\"          Standing hip flexor stretch gabriel  3x30\" b/l 3x30\" b/l          Supine LTR             Prone press-ups Trial NV 2x10 3\"                                                  Ther Activity                                       Gait Training                                       Modalities                                                    "

## 2024-04-01 ENCOUNTER — EVALUATION (OUTPATIENT)
Dept: PHYSICAL THERAPY | Facility: CLINIC | Age: 59
End: 2024-04-01
Payer: COMMERCIAL

## 2024-04-01 DIAGNOSIS — M54.50 CHRONIC MIDLINE LOW BACK PAIN WITHOUT SCIATICA: Primary | ICD-10-CM

## 2024-04-01 DIAGNOSIS — G89.29 CHRONIC MIDLINE LOW BACK PAIN WITHOUT SCIATICA: Primary | ICD-10-CM

## 2024-04-01 PROCEDURE — 97140 MANUAL THERAPY 1/> REGIONS: CPT | Performed by: PHYSICAL THERAPIST

## 2024-04-01 PROCEDURE — 97110 THERAPEUTIC EXERCISES: CPT | Performed by: PHYSICAL THERAPIST

## 2024-04-01 NOTE — PROGRESS NOTES
PT Re-Evaluation     Today's date: 24  Patient name: Hammad Meraz  : 1965  MRN: [id number]  Referring provider: Walter Ken PA-C  Dx:   Encounter Diagnosis     ICD-10-CM    1. Chronic midline low back pain without sciatica  M54.50     G89.29                       Assessment  Assessment details: Patient reports feeling 80% improved to date.  Pain is no longer constant in nature.  Intensity of his pain is greatly reduced and subsides more quickly with rest.  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  He is independent and compliant with his HEP and feels he is ready to transition to a HEP in order to conserve insurance visits for the year.  Objectively, lumbar ROM is greatly improved, although there are still limitations in lumbar flexion and extension.  Joint mobility has also improved in the lumbar spine.  Still requires occasional VC's to correct posture.  DC skilled PT services at this time per patient request.    Hammad Rosss a 58 y.o. male referred with primary diagnosis of Chronic midline low back pain without sciatica  (primary encounter diagnosis) .  Patient presents with the following functional limitations:  Pain with prolonged driving, end of day, playing golf.  Patient demonstrates poor postural awareness, significant limitations in lumbar ROM, decreased segmental mobility of the lumbar spine, and poor flexibility of the LE musculature.  Trial of mobilizations significantly improved lumbar ROM and decreased pain with motion.  Treatment to include: Manual therapy techniques, extremity/core strengthening, neuromuscular control exercises, instruction in a comprehensive HEP, and modalities as needed.  They will benefit from skilled PT services to address the above functional deficits and to decrease pain to promote a return to their premorbid level of  function.    Impairments: abnormal or restricted ROM, activity intolerance, impaired physical strength, lacks appropriate home exercise program, pain with function, poor posture  and poor body mechanics  Functional limitations: Pain with prolonged driving, end of day, playing golf.Understanding of Dx/Px/POC: good   Prognosis: fair    Goals  STG (4 weeks)  1. Patient will demonstrate the ability to correct posture with minimal VC's - met  2. Patient will demonstrate 25% gains in lumbar ROM in all deficient planes - met  3. Patient will report pain as a 4-5/10 at worst with all normal activities - met  4. Patient will report 50% reduction in sleep disturbances related to pain - met  LTG (8 weeks)  1. Patient will report pain as a 0-1/10 at worst with normal activities - not met  2. Patient will sleep through the night without disturbances related to pain - not met  3. Patient will demonstrate lumbar ROM WNL to facilitate improved quality of gait - partially met  4. Patient will demonstrate core strength WNL to improve improve transfers, quality of gait, and ability to negotiate stairs. - met  5. Patient will be independent and compliant with a HEP in order to maintain gains made with skilled PT services. - met      Plan  Patient would benefit from: skilled physical therapy  Planned therapy interventions: patient education and home exercise program  Plan of Care beginning date: 2024  Plan of Care expiration date: 2024  Treatment plan discussed with: patient    Subjective Evaluation    History of Present Illness  Mechanism of injury: Pain is no longer having constant pain.  He feels 80% improved to date.  Intensity and frequency of pain is much better.  Patient performs HEP daily.  He has noticed he is able to do more strenuous activity with little or no pain.  (+) sleep disturbances 2-3x/night.  Patient Goals  Patient goals for therapy: decreased pain    Pain  Current pain ratin  At best pain rating:  0  At worst pain ratin  Location: Central lower back L1-5 and occasionally into right buttock.  Quality: pressure and squeezing  Relieving factors: heat (longer walk.  First OOB in the AM)  Exacerbated by: toward end of the day.  Golfing.  Hiking hills.  Standing stationary for a couple of minutes.  Lying on right side.  Turning over in bed.  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  In the spring walks 30-60 minutes daily with dogs.      Diagnostic Tests    FCE comments: Right gluteal pain with lying on right side.  Has not had pain with driving in the car.  Denies weakness into bilateral LE  Denies paresthesias into bilateral LE  Denies saddle anesthesias  (+) cough/sneeze for lower back pain.  (+) sleep disturbances 2-3x/night  Has lost 35# over the past 5 months.Treatments  Previous treatment: chiropractic and medication  Current treatment: medication      Objective     Concurrent Complaints  Positive for disturbed sleep. Negative for night pain, bladder dysfunction, bowel dysfunction, saddle (S4) numbness, history of cancer and history of trauma    Static Posture     Thoracic Spine  Hyperkyphosis.    Lumbar Spine   Flattened.     Tenderness     Lumbar Spine  No tenderness in the left transverse process and right transverse process. Spinous process tenderness: L3-5.  L3-4 referred pain to right buttock.    Left Hip   No tenderness in the ASIS and PSIS.     Right Hip   No tenderness in the ASIS and PSIS.     Active Range of Motion     Lumbar   Flexion:  Restriction level: moderate  Extension:  Restriction level: moderate  Left rotation:  Restriction level: minimal  Right rotation:  Restriction level: minimal    Joint Play     Hypomobile: L1, L2, L3, L4 and L5     Strength/Myotome Testing     Left Hip  "  Planes of Motion   Flexion: 5  Extension: 5  Abduction: 5    Right Hip   Planes of Motion   Flexion: 5  Extension: 5  Abduction: 5    Left Knee   Extension: 5    Right Knee   Extension: 5    Left Ankle/Foot   Dorsiflexion: 5  Great toe flexion: 4+    Right Ankle/Foot   Dorsiflexion: 5  Great toe flexion: 4+    Tests     Lumbar   Negative SIJ compression, sacroiliac distraction and sacral spring .     Left   Negative passive SLR, quadrant and slump test.     Right   Negative passive SLR, quadrant and slump test.     Left Hip   Negative KATIE and FADIR.     Right Hip   Negative KATIE and FADIR.     Additional Tests Details  (+) HS, hip flexor and quadriceps tightness gabriel.         Precautions: Anxiety, Chronic back pain  Access Code: [email or web address]  URL: [email or web address]  Date: 03/04/2024  Prepared by: Adrian Sosa  POC expires Unit limit Auth  expiration date PT/OT + Visit Limit?   4/22/*24 NA 12/31/24 12                 Visit/Unit Tracking  AUTH Status:  Date 2/26 2/29 3/4 3/11 3/18 3/25 4/1        Authorized Used 1 2 3 4 5 6 7         Remaining  11 10 9 8 7 6 5              Manuals 2/26 2/29 3/4 3/11 3/18 3/25 4/1      CPA mobs L3-5 JF grade III-IV  JF grade III GR Gr. III-IV GR Gr. III-IV GR Gr. III-IV GR Gr. III-IV      Rotation mobs L and R SL JF grade III            Left UPA mobs  NV L3-5  JF L3-5 grade III GR Gr. III-IV GR Gr. III-IV GR Gr. III-IV GR Gr. III-IV      RE performed       JF      Neuro Re-Ed             Pt education L-spine anatomy and pathology.  Centralization vs Peripheralization            Webslide rows M,L  BTB 2x10 3\" ea. BTB 3x10 3\" ea. Black Black 3x10 Black 3x10       T-band Paloff Press  BTB 10x10\" ea. BTB 10x10\" ea.  Black 10\"x10   Black 10\"x10       Bridges   NV 2x10 5\" RTB 5\"x30 RTB 5\"x30       SL clamshells   NV RTB 2x10 5\" b/l RTB 2x10 5\" b/l RTB 2x10 5\" b/l       Qped UE raise   NV 15x5\"         Qped LE raise   NV 15x5\"           Ther Ex             Nustep with UE  L7 " "10 min L7 10 min L7 10 min L7 10 min L7 10 min L7 10 min      HS stretch gabriel  Supine 3x30\" b/l Step 3x30\"          Standing hip flexor stretch gabriel  3x30\" b/l 3x30\" b/l          Supine LTR             Prone press-ups Trial NV 2x10 3\"                                                  Ther Activity                                       Gait Training                                       Modalities                                            "

## 2024-04-29 DIAGNOSIS — F41.9 ANXIETY: ICD-10-CM

## 2024-04-29 DIAGNOSIS — E78.2 MIXED HYPERLIPIDEMIA: ICD-10-CM

## 2024-04-30 RX ORDER — ROSUVASTATIN CALCIUM 5 MG/1
TABLET, COATED ORAL
Qty: 90 TABLET | Refills: 3 | Status: SHIPPED | OUTPATIENT
Start: 2024-04-30

## 2024-04-30 RX ORDER — FLUOXETINE HYDROCHLORIDE 40 MG/1
CAPSULE ORAL
Qty: 90 CAPSULE | Refills: 3 | Status: SHIPPED | OUTPATIENT
Start: 2024-04-30

## 2024-05-17 ENCOUNTER — OFFICE VISIT (OUTPATIENT)
Dept: FAMILY MEDICINE CLINIC | Facility: CLINIC | Age: 59
End: 2024-05-17
Payer: COMMERCIAL

## 2024-05-17 VITALS
HEIGHT: 74 IN | TEMPERATURE: 97.9 F | HEART RATE: 66 BPM | OXYGEN SATURATION: 100 % | DIASTOLIC BLOOD PRESSURE: 80 MMHG | WEIGHT: 237 LBS | SYSTOLIC BLOOD PRESSURE: 124 MMHG | BODY MASS INDEX: 30.42 KG/M2

## 2024-05-17 DIAGNOSIS — L20.89 OTHER ATOPIC DERMATITIS: ICD-10-CM

## 2024-05-17 DIAGNOSIS — I71.40 ABDOMINAL AORTIC ANEURYSM (AAA) WITHOUT RUPTURE, UNSPECIFIED PART (HCC): ICD-10-CM

## 2024-05-17 DIAGNOSIS — G47.33 SEVERE OBSTRUCTIVE SLEEP APNEA: ICD-10-CM

## 2024-05-17 DIAGNOSIS — E78.2 MIXED HYPERLIPIDEMIA: ICD-10-CM

## 2024-05-17 DIAGNOSIS — L30.9 ECZEMA, UNSPECIFIED TYPE: ICD-10-CM

## 2024-05-17 DIAGNOSIS — F41.9 ANXIETY: ICD-10-CM

## 2024-05-17 DIAGNOSIS — Z12.5 SCREENING FOR PROSTATE CANCER: ICD-10-CM

## 2024-05-17 DIAGNOSIS — R91.1 PULMONARY NODULE: ICD-10-CM

## 2024-05-17 DIAGNOSIS — Z00.00 ANNUAL PHYSICAL EXAM: Primary | ICD-10-CM

## 2024-05-17 PROBLEM — U07.1 COVID-19 VIRUS INFECTION: Status: RESOLVED | Noted: 2021-12-15 | Resolved: 2024-05-17

## 2024-05-17 PROCEDURE — 99396 PREV VISIT EST AGE 40-64: CPT | Performed by: NURSE PRACTITIONER

## 2024-05-17 RX ORDER — CRISABOROLE 20 MG/G
OINTMENT TOPICAL
Qty: 100 G | Refills: 14 | Status: SHIPPED | OUTPATIENT
Start: 2024-05-17

## 2024-05-17 NOTE — ASSESSMENT & PLAN NOTE
"Family Medicine Office Visit  Date of Service: 02/25/2019    Marco Gonzalez is a 7 y.o. female who presents for Sore Throat (x weekend) and Cough (x weekend)    Child has been sick since this weekend.   This weekend, it started with a cough  This morning also developed a sore throat    Mom is worried that she's contagious  Needs school note for today    Nobody sick at home or school, that she knows of  No fever  No headaches  No body aches  Has rhinorrhea  No wheezing  Last used inhaler about 1.5 years ago    Here with Mom    Objective    Blood pressure 80/60, pulse 80, resp. rate 16, height 4' 2.5\" (1.283 m), weight 83 lb (37.6 kg). Body mass index is 22.88 kg/m . Patient reports that  has never smoked. she has never used smokeless tobacco.    Gen: Alert, well-appearing.  Ears: canals clear, tympanic membranes clear without effusion.   Eyes: no conjunctivitis.   Sinuses: non-tender.  Nose: normal mucosa.   Mouth: adequate dentition.   Tonsils/oropharynx: Erythema around tonsils  Neck: no significant lymphadenopathy, thyroid not enlarged, not tender.    Heart: Regular rate and rhythm, no murmurs.  Lungs: Clear to auscultation bilaterally except tiny wheeze left lower lobe posteriorly, no increased work of breathing.  Abdomen: Soft, non-tender, non-distended, bowel sounds normal.  Extremities: No clubbing, cyanosis, edema.    Results for orders placed or performed in visit on 02/25/19   Influenza A/B Rapid Test   Result Value Ref Range    Influenza  A, Rapid Antigen No Influenza A antigen detected No Influenza A antigen detected    Influenza B, Rapid Antigen No Influenza B antigen detected No Influenza B antigen detected   Rapid Strep A Screen-Throat   Result Value Ref Range    Rapid Strep A Antigen Group A Strep detected (!) No Group A Strep detected, presumptive negative     No results found.    Assessment & Plan    1. Acute streptococcal pharyngitis.  Rapid influenza negative.  Rapid strep test positive. " Following with CT scans    Treat with penicillin (oral).  Tylenol and ibuprofen sent for relief of symptoms.  2. Mild intermittent asthma.  Just a tiny bit of wheezing today on exam.  She has not had symptoms in a year and a half though.  Albuterol inhaler was refilled.  She does have a spacer.  Asthma action plan and asthma control test were done.  She does need a flu shot.  Mom declined flu shot today.  3. Health maintenance.  Schedule well-child check.  Due for DTaP and influenza immunization at that visit.    Problems Addressed During This Visit                                                                              1. Pharyngitis due to Streptococcus species  - Influenza A/B Rapid Test  - Rapid Strep A Screen-Throat    2. Mild intermittent asthma without complication  - albuterol (PROAIR HFA) 90 mcg/actuation inhaler; Inhale 2 puffs every 6 (six) hours as needed for wheezing.  Dispense: 18 g; Refill: 4            No future appointments.   Completed by:   Cecilia Dawkins M.D.  [facility]  2/25/2019 8:56 AM  This transcription uses voice recognition software, which may contain typographical errors.

## 2024-05-17 NOTE — PROGRESS NOTES
Adult Annual Physical  Name: Hammad Meraz      : 1965      MRN: [id number]  Encounter Provider: ROLF Lucero  Encounter Date: 2024   Encounter department: [facility]    Assessment & Plan   1. Annual physical exam  2. Mixed hyperlipidemia  Assessment & Plan:  Has stopped the Crestor about 2 months ago r/t joint pain and stopped and was helping with the joint pains   Orders:  -     Comprehensive metabolic panel; Future  -     CBC and differential; Future  -     Lipid panel; Future  3. Screening for prostate cancer  -     PSA, Total Screen; Future  4. Abdominal aortic aneurysm (AAA) without rupture, unspecified part (HCC)  Assessment & Plan:  Following with vascular and has imaging ordered   5. Severe obstructive sleep apnea  Assessment & Plan:  Not tolerant of the mask and has lost weight and has helped   6. Pulmonary nodule  Assessment & Plan:  Following with CT scans  7. Eczema, unspecified type  Assessment & Plan:  Using topicals   8. Anxiety  Assessment & Plan:  Patient is taking the Prozac 40 mg and prn hydroxyzine   9. Other atopic dermatitis  -     Crisaborole (Eucrisa) 2 % OINT; APPLY TOPICALLY TWICE A DAY MONDAY THROUGH FRIDAY TO TRUNK AND EXTREMITIES  Immunizations and preventive care screenings were discussed with patient today. Appropriate education was printed on patient's after visit summary.    Discussed risks and benefits of prostate cancer screening. We discussed the controversial history of PSA screening for prostate cancer in the [place] as well as the risk of over detection and over treatment of prostate cancer by way of PSA screening.  The patient understands that PSA blood testing is an imperfect way to screen for prostate cancer and that elevated PSA levels in the blood may also be caused by infection, inflammation, prostatic trauma or manipulation, urological procedures, or by benign prostatic enlargement.    The role of the digital  rectal examination in prostate cancer screening was also discussed and I discussed with him that there is large interobserver variability in the findings of digital rectal examination.    Counseling:  Injury prevention: discussed safety/seat belts, safety helmets, smoke detectors, carbon dioxide detectors, and smoking near bedding or upholstery.  Exercise: the importance of regular exercise/physical activity was discussed. Recommend exercise 3-5 times per week for at least 30 minutes.          History of Present Illness   {Disappearing Hyperlinks I Encounters * My Last Note * Since Last Visit * History :75942}  Adult Annual Physical:  Patient presents for annual physical. Patient here for his check up and has no new complaints .     Diet and Physical Activity:  - Diet/Nutrition: well balanced diet.  - Exercise: walking.    Depression Screening:  - PHQ-2 Score: 0  - PHQ-9 Score: 0    General Health:  - Sleep: sleeps well.  - Hearing: normal hearing bilateral ears.  - Vision: goes for regular eye exams and wears glasses.  - Dental: regular dental visits.    /GYN Health:    - History of STDs: no     Health:  - History of STDs: no.     Advanced Care Planning:  - Has an advanced directive?: yes    - Has a durable medical POA?: no    - ACP document given to patient?: no      Review of Systems   Constitutional:  Negative for activity change, appetite change, chills, diaphoresis, fatigue, fever and unexpected weight change.   HENT:  Negative for congestion, ear pain, hearing loss, postnasal drip, sinus pressure, sinus pain, sneezing and sore throat.    Eyes:  Negative for pain, redness and visual disturbance.   Respiratory:  Negative for cough and shortness of breath.    Cardiovascular:  Negative for chest pain and leg swelling.   Gastrointestinal:  Negative for abdominal pain, diarrhea, nausea and vomiting.   Endocrine: Negative.    Genitourinary: Negative.    Musculoskeletal:  Negative for arthralgias.   Skin:   Positive for rash.   Allergic/Immunologic: Negative.    Neurological:  Negative for dizziness and light-headedness.   Hematological: Negative.    Psychiatric/Behavioral:  Negative for behavioral problems and dysphoric mood.      Pertinent Medical History     Medical History Reviewed by provider this encounter:  Meds  Problems       Past Medical History   Past Medical History:   Diagnosis Date   • Allergic    • Anxiety    • Eczema    • Gastric ulcer    • Hypercholesterolemia    • Impacted cerumen of left ear 02/27/2019   • Sleep apnea      Past Surgical History:   Procedure Laterality Date   • CYST REMOVAL     • EGD     • EXAMINATION UNDER ANESTHESIA N/A 9/24/2020    Procedure: DRUG INDUCED SLEEP ENDOSCOPY;  Surgeon: Liang Cramer MD;  Location: [facility];  Service: ENT   • LASIK       Family History   Problem Relation Age of Onset   • Cancer Mother         ovarian and stomach   • Coronary artery disease Mother    • Coronary artery disease Father    • Cancer Father         pancratic     Current Outpatient Medications on File Prior to Visit   Medication Sig Dispense Refill   • aspirin 81 mg chewable tablet Chew 1 tablet (81 mg total) daily     • FLUoxetine (PROzac) 40 MG capsule TAKE 1 CAPSULE DAILY AT BEDTIME 90 capsule 3   • hydrOXYzine HCL (ATARAX) 25 mg tablet Take 1 tablet (25 mg total) by mouth daily at bedtime 90 tablet 3   • multivitamin (THERAGRAN) TABS Take 1 tablet by mouth daily     • pantoprazole (PROTONIX) 40 mg tablet TAKE 1 TABLET DAILY BEFORE BREAKFAST 90 tablet 3   • [DISCONTINUED] Crisaborole (Eucrisa) 2 % OINT APPLY TOPICALLY TWICE A DAY MONDAY THROUGH FRIDAY TO TRUNK AND EXTREMITIES 100 g 14   • [DISCONTINUED] rosuvastatin (CRESTOR) 5 mg tablet TAKE 1 TABLET DAILY AT BEDTIME (Patient not taking: Reported on 5/17/2024) 90 tablet 3     No current facility-administered medications on file prior to visit.     Allergies   Allergen Reactions   • Medical Tape Itching and Rash      Current  "Outpatient Medications on File Prior to Visit   Medication Sig Dispense Refill   • aspirin 81 mg chewable tablet Chew 1 tablet (81 mg total) daily     • FLUoxetine (PROzac) 40 MG capsule TAKE 1 CAPSULE DAILY AT BEDTIME 90 capsule 3   • hydrOXYzine HCL (ATARAX) 25 mg tablet Take 1 tablet (25 mg total) by mouth daily at bedtime 90 tablet 3   • multivitamin (THERAGRAN) TABS Take 1 tablet by mouth daily     • pantoprazole (PROTONIX) 40 mg tablet TAKE 1 TABLET DAILY BEFORE BREAKFAST 90 tablet 3   • [DISCONTINUED] Crisaborole (Eucrisa) 2 % OINT APPLY TOPICALLY TWICE A DAY MONDAY THROUGH FRIDAY TO TRUNK AND EXTREMITIES 100 g 14   • [DISCONTINUED] rosuvastatin (CRESTOR) 5 mg tablet TAKE 1 TABLET DAILY AT BEDTIME (Patient not taking: Reported on 2024) 90 tablet 3     No current facility-administered medications on file prior to visit.      Social History     Tobacco Use   • Smoking status: Former     Current packs/day: 0.00     Types: Cigarettes     Quit date:      Years since quittin.3   • Smokeless tobacco: Never   Vaping Use   • Vaping status: Never Used   Substance and Sexual Activity   • Alcohol use: Yes     Comment: socially   • Drug use: Never   • Sexual activity: Yes     Partners: Female       Objective   {Disappearing Hyperlinks   Review Vitals * Enter New Vitals * Results Review * Labs * Imaging * Cardiology * Procedures * Lung Cancer Screening :85432}  /80 (BP Location: Right arm, Patient Position: Sitting)   Pulse 66   Temp 97.9 °F (36.6 °C) (Temporal)   Ht 6' 2\" (1.88 m)   Wt 108 kg (237 lb)   SpO2 100%   BMI 30.43 kg/m²     Physical Exam  Vitals and nursing note reviewed.   Constitutional:       General: He is not in acute distress.     Appearance: He is well-developed.   HENT:      Head: Normocephalic and atraumatic.      Right Ear: Tympanic membrane normal.      Left Ear: Tympanic membrane normal.      Nose: Nose normal.      Mouth/Throat:      Mouth: Mucous membranes are moist. "   Eyes:      Conjunctiva/sclera: Conjunctivae normal.   Cardiovascular:      Rate and Rhythm: Normal rate and regular rhythm.      Heart sounds: No murmur heard.  Pulmonary:      Effort: Pulmonary effort is normal. No respiratory distress.      Breath sounds: Normal breath sounds.   Abdominal:      Palpations: Abdomen is soft.      Tenderness: There is no abdominal tenderness.   Musculoskeletal:         General: No swelling.      Cervical back: Neck supple.   Skin:     General: Skin is warm and dry.      Capillary Refill: Capillary refill takes less than 2 seconds.   Neurological:      General: No focal deficit present.      Mental Status: He is alert and oriented to person, place, and time.   Psychiatric:         Mood and Affect: Mood normal.       Administrative Statements {Disappearing Hyperlinks I  [facility] of Service [id number]}

## 2024-05-17 NOTE — ASSESSMENT & PLAN NOTE
Has stopped the Crestor about 2 months ago r/t joint pain and stopped and was helping with the joint pains

## 2024-05-20 DIAGNOSIS — L30.9 ECZEMA, UNSPECIFIED TYPE: ICD-10-CM

## 2024-05-20 NOTE — TELEPHONE ENCOUNTER
Last visit on 05/10/2023 .Called patient to schedule an appointment , I did offer 07/11/2024 at 2:10 but he said that he has to work if he does not work he does not get paid , then offered the next available on 10/31 at 4:10 pm and patient did not like it so he hung up the phone and said that he will go somewhere else .

## 2024-05-21 RX ORDER — HYDROXYZINE HYDROCHLORIDE 25 MG/1
25 TABLET, FILM COATED ORAL
Qty: 90 TABLET | Refills: 0 | Status: SHIPPED | OUTPATIENT
Start: 2024-05-21

## 2024-05-28 ENCOUNTER — TELEPHONE (OUTPATIENT)
Age: 59
End: 2024-05-28

## 2024-05-28 NOTE — TELEPHONE ENCOUNTER
Patient called stating [facility] sent him the wrong medication. States he was sent  fluocinonide instead of Eucrisa oint. I advised patient that Eucrisa was sent to [facility] on 5/17/24 and that Fluocinoide was d/c'd on 7/13/22. Advised patient to call [facility].

## 2024-05-31 DIAGNOSIS — L20.89 OTHER ATOPIC DERMATITIS: ICD-10-CM

## 2024-05-31 RX ORDER — CRISABOROLE 20 MG/G
OINTMENT TOPICAL
Qty: 100 G | Refills: 14 | Status: SHIPPED | OUTPATIENT
Start: 2024-05-31

## 2024-05-31 NOTE — TELEPHONE ENCOUNTER
"Patient is very upset over this, this is the third time it happened, the script needs to state \"no substitutes, name brand required\" for the Eucrisa, the patient would like a call when it is sent properly  "

## 2024-06-10 ENCOUNTER — TELEPHONE (OUTPATIENT)
Dept: CARDIAC SURGERY | Facility: CLINIC | Age: 59
End: 2024-06-10

## 2024-06-10 NOTE — TELEPHONE ENCOUNTER
Tried to call patient in regards to a change in his upcoming appointment patients phone kept having a busy signal. I did send the new information in the mail.    New date and time is Virtual appointment September 23, 2024 @ 11 am.    Thank you

## 2024-06-25 ENCOUNTER — APPOINTMENT (OUTPATIENT)
Dept: LAB | Facility: CLINIC | Age: 59
End: 2024-06-25
Payer: COMMERCIAL

## 2024-06-25 DIAGNOSIS — Z12.5 SCREENING FOR PROSTATE CANCER: ICD-10-CM

## 2024-06-25 DIAGNOSIS — E78.2 MIXED HYPERLIPIDEMIA: ICD-10-CM

## 2024-06-25 LAB
ALBUMIN SERPL BCG-MCNC: 4.1 G/DL (ref 3.5–5)
ALP SERPL-CCNC: 47 U/L (ref 34–104)
ALT SERPL W P-5'-P-CCNC: 17 U/L (ref 7–52)
ANION GAP SERPL CALCULATED.3IONS-SCNC: 6 MMOL/L (ref 4–13)
AST SERPL W P-5'-P-CCNC: 16 U/L (ref 13–39)
BASOPHILS # BLD AUTO: 0.02 THOUSANDS/ÂΜL (ref 0–0.1)
BASOPHILS NFR BLD AUTO: 0 % (ref 0–1)
BILIRUB SERPL-MCNC: 1.16 MG/DL (ref 0.2–1)
BUN SERPL-MCNC: 23 MG/DL (ref 5–25)
CALCIUM SERPL-MCNC: 9.2 MG/DL (ref 8.4–10.2)
CHLORIDE SERPL-SCNC: 108 MMOL/L (ref 96–108)
CHOLEST SERPL-MCNC: 200 MG/DL
CO2 SERPL-SCNC: 27 MMOL/L (ref 21–32)
CREAT SERPL-MCNC: 0.66 MG/DL (ref 0.6–1.3)
EOSINOPHIL # BLD AUTO: 0.21 THOUSAND/ÂΜL (ref 0–0.61)
EOSINOPHIL NFR BLD AUTO: 4 % (ref 0–6)
ERYTHROCYTE [DISTWIDTH] IN BLOOD BY AUTOMATED COUNT: 14.2 % (ref 11.6–15.1)
GFR SERPL CREATININE-BSD FRML MDRD: 105 ML/MIN/1.73SQ M
GLUCOSE P FAST SERPL-MCNC: 91 MG/DL (ref 65–99)
HCT VFR BLD AUTO: 41.4 % (ref 36.5–49.3)
HDLC SERPL-MCNC: 41 MG/DL
HGB BLD-MCNC: 13.7 G/DL (ref 12–17)
IMM GRANULOCYTES # BLD AUTO: 0.01 THOUSAND/UL (ref 0–0.2)
IMM GRANULOCYTES NFR BLD AUTO: 0 % (ref 0–2)
LDLC SERPL CALC-MCNC: 139 MG/DL (ref 0–100)
LYMPHOCYTES # BLD AUTO: 1.63 THOUSANDS/ÂΜL (ref 0.6–4.47)
LYMPHOCYTES NFR BLD AUTO: 30 % (ref 14–44)
MCH RBC QN AUTO: 29.3 PG (ref 26.8–34.3)
MCHC RBC AUTO-ENTMCNC: 33.1 G/DL (ref 31.4–37.4)
MCV RBC AUTO: 89 FL (ref 82–98)
MONOCYTES # BLD AUTO: 0.57 THOUSAND/ÂΜL (ref 0.17–1.22)
MONOCYTES NFR BLD AUTO: 10 % (ref 4–12)
NEUTROPHILS # BLD AUTO: 3.06 THOUSANDS/ÂΜL (ref 1.85–7.62)
NEUTS SEG NFR BLD AUTO: 56 % (ref 43–75)
NONHDLC SERPL-MCNC: 159 MG/DL
NRBC BLD AUTO-RTO: 0 /100 WBCS
PLATELET # BLD AUTO: 200 THOUSANDS/UL (ref 149–390)
PMV BLD AUTO: 10.9 FL (ref 8.9–12.7)
POTASSIUM SERPL-SCNC: 4.3 MMOL/L (ref 3.5–5.3)
PROT SERPL-MCNC: 6.5 G/DL (ref 6.4–8.4)
PSA SERPL-MCNC: 1.99 NG/ML (ref 0–4)
RBC # BLD AUTO: 4.67 MILLION/UL (ref 3.88–5.62)
SODIUM SERPL-SCNC: 141 MMOL/L (ref 135–147)
TRIGL SERPL-MCNC: 102 MG/DL
WBC # BLD AUTO: 5.5 THOUSAND/UL (ref 4.31–10.16)

## 2024-06-25 PROCEDURE — 80061 LIPID PANEL: CPT

## 2024-06-25 PROCEDURE — 80053 COMPREHEN METABOLIC PANEL: CPT

## 2024-06-25 PROCEDURE — 36415 COLL VENOUS BLD VENIPUNCTURE: CPT

## 2024-06-25 PROCEDURE — 85025 COMPLETE CBC W/AUTO DIFF WBC: CPT

## 2024-06-25 PROCEDURE — G0103 PSA SCREENING: HCPCS

## 2024-08-14 ENCOUNTER — HOSPITAL ENCOUNTER (OUTPATIENT)
Dept: VASCULAR ULTRASOUND | Facility: HOSPITAL | Age: 59
Discharge: HOME | End: 2024-08-14
Payer: COMMERCIAL

## 2024-08-14 DIAGNOSIS — I71.43 INFRARENAL ABDOMINAL AORTIC ANEURYSM (AAA) WITHOUT RUPTURE (HCC): ICD-10-CM

## 2024-08-14 PROCEDURE — 93978 VASCULAR STUDY: CPT

## 2024-08-15 PROCEDURE — 93978 VASCULAR STUDY: CPT | Performed by: SURGERY

## 2024-08-16 ENCOUNTER — TELEPHONE (OUTPATIENT)
Dept: VASCULAR SURGERY | Facility: CLINIC | Age: 59
End: 2024-08-16

## 2024-08-16 NOTE — TELEPHONE ENCOUNTER
----- Message from ROLF Miller sent at 8/15/2024  9:47 PM EDT -----  Please schedule OV for results review/ [facility]

## 2024-08-19 DIAGNOSIS — L30.9 ECZEMA, UNSPECIFIED TYPE: ICD-10-CM

## 2024-08-19 NOTE — TELEPHONE ENCOUNTER
Refill must be reviewed and completed by the office or provider. The refill is unable to be approved or denied by the medication management team.    Patient not seen since 5/10/23

## 2024-08-20 RX ORDER — HYDROXYZINE HYDROCHLORIDE 25 MG/1
25 TABLET, FILM COATED ORAL
Qty: 90 TABLET | Refills: 3 | Status: SHIPPED | OUTPATIENT
Start: 2024-08-20

## 2024-09-13 ENCOUNTER — HOSPITAL ENCOUNTER (OUTPATIENT)
Dept: CT IMAGING | Facility: HOSPITAL | Age: 59
End: 2024-09-13
Payer: COMMERCIAL

## 2024-09-13 DIAGNOSIS — R91.1 PULMONARY NODULE: ICD-10-CM

## 2024-09-13 PROCEDURE — 71250 CT THORAX DX C-: CPT

## 2024-09-13 PROCEDURE — G1004 CDSM NDSC: HCPCS

## 2024-09-23 ENCOUNTER — TELEMEDICINE (OUTPATIENT)
Dept: CARDIAC SURGERY | Facility: CLINIC | Age: 59
End: 2024-09-23
Payer: COMMERCIAL

## 2024-09-23 DIAGNOSIS — D35.02 ADENOMA OF LEFT ADRENAL GLAND: ICD-10-CM

## 2024-09-23 DIAGNOSIS — R91.1 PULMONARY NODULE: Primary | ICD-10-CM

## 2024-09-23 PROCEDURE — 99213 OFFICE O/P EST LOW 20 MIN: CPT

## 2024-09-23 NOTE — ASSESSMENT & PLAN NOTE
Hammad Meraz is a 58-year-old male with previous tobacco abuse who quit in 2007 who is following with our office for approximately 2 years in the surveillance of a left lower lobe pulmonary nodule. Recent CT scan of the chest on 9/13/2024 notes decrease in size of the left pulmonary nodule to 6 x 5 mm, previously 10 x 5 mm.  Given this decrease in size, recommend an additional CT scan of the chest in 1 year to monitor stability or further decrease.  We did discuss that patient has significant history of tobacco exposure but quit smoking greater than 15 years ago so does not formally qualify for ongoing lung cancer surveillance screening once we have completed surveillance of this left lower lobe pulmonary nodule.     Orders:    CT chest wo contrast; Future

## 2024-09-23 NOTE — ASSESSMENT & PLAN NOTE
CT scan of the chest also notes stable 2.7 cm left adrenal adenoma.  This continues to be stable and would recommend follow-up with patient's PCP.

## 2024-09-23 NOTE — PROGRESS NOTES
Assessment/Plan:    No problem-specific Assessment & Plan notes found for this encounter.       There are no diagnoses linked to this encounter.      Thoracic History   Diagnosis: Left lower lobe pulmonary nodule  Procedures/Surgeries: 1  Pathology: 1    Cancer Staging   No matching staging information was found for the patient.    Oncology History    No history exists.            Subjective:    Patient ID: Hammad Meraz is a 58 y.o. male.    HPI    Hammad Meraz is a 58 y.o. male who presents today for surveillance of a left lower lobe pulmonary nodule. At time of last office visit on 9/15/2023, patient's left lower lobe pulmonary nodule slightly decreased in size/showed stability on most recent CT scan and his CT scan interval space out to 1 year.    CT scan of the chest completed on 9/13/2024 was personally reviewed by myself and in PACS demonstrating interval decrease in the left lower lobe pulmonary nodule now measuring 6 x 5 mm, previously 10 x 5 mm.  There are small calcified granulomas. Stable faint tree-in-bud nodules of right upper lobe.   Stable 2.7 cm left adrenal adenoma.     On discussion today, patient reports ***breathing  Shortness of breath ***  Cough *** Denies hemoptysis.   Denies *** cough, fevers, chills, chest pain, new headaches, extremity weakness/numbness, blurred vision, new onset bone or joint pain, unexplained weight loss.   Ambulation ***  Denies recent lung infections.  ***  Smoking history: Former smoker, quit in 2007.   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  Changes in medical history ***  Medical oncology***    Colon cancer screening:     Hammad Meraz is a 58-year-old male with previous tobacco abuse who quit in 2007 who is following with our office for approximately 2 years in the surveillance of a left lower lobe pulmonary nodule. Recent CT scan of the chest on 9/13/2024 notes decrease in size of the left pulmonary nodule to 6 x 5  mm, previously 10 x 5 mm.  Given this decrease in size, recommend an additional CT scan of the chest in 1 year.    CT scan also notes stable 2.7 cm left adrenal adenoma.      The following portions of the patient's history were reviewed and updated as appropriate: allergies, current medications, past family history, past medical history, past social history, past surgical history and problem list.    Review of Systems      Objective:   Physical ExamThere were no vitals taken for this visit.    No Chest XR results available for this patient.   CT chest wo contrast    Result Date: 9/16/2024  Narrative CT CHEST WITHOUT IV CONTRAST INDICATION: R91.1: Solitary pulmonary nodule. COMPARISON: CT chest 9/8/2023, 5/19/2022 TECHNIQUE: CT examination of the chest was performed without intravenous contrast. Multiplanar 2D reformatted images were created from the source data. This examination, like all CT scans performed in the [facility], was performed utilizing techniques to minimize radiation dose exposure, including the use of iterative reconstruction and automated exposure control. Radiation dose length product (DLP) for this visit: 421 mGy-cm FINDINGS: LUNGS: Stable faint tree-in-bud nodules of right upper lobe (series 3 image 68). Interval decrease in size of left lower lobe nodule measuring 6 x 5 mm (series 3 image 220), previously 10 x 5 mm. Small calcified granulomas. There is no tracheal or endobronchial lesion. PLEURA: Unremarkable. HEART/GREAT VESSELS: Coronary artery calcifications. No thoracic aortic aneurysm. MEDIASTINUM AND JAYNA: Unremarkable. CHEST WALL AND LOWER NECK: Unremarkable. VISUALIZED STRUCTURES IN THE UPPER ABDOMEN: Stable 2.7 cm left adrenal adenoma. OSSEOUS STRUCTURES: No acute fracture or destructive osseous lesion.     Impression 1. Decreased size of left lower lobe nodule measuring 6 x 5 mm, previously 10 x 5 mm. 2. Stable 2.7 cm left adrenal adenoma Workstation performed:  [id number]     No CT Chest,Abdomen,Pelvis results available for this patient.   No NM PET CT results available for this patient.   No Barium Swallow results available for this patient.

## 2024-09-23 NOTE — PROGRESS NOTES
Virtual Regular Visit  Name: Hammad Meraz      : 1965      MRN: [id number]  Encounter Provider: Thoracic Oncology [facility]  Encounter Date: 2024   Encounter department: [facility]    Verification of patient location:    Patient is located at Other (car) in the following state in which I hold an active license [place]    Assessment & Plan  Pulmonary nodule  Hammad Meraz is a 58-year-old male with previous tobacco abuse who quit in  who is following with our office for approximately 2 years in the surveillance of a left lower lobe pulmonary nodule. Recent CT scan of the chest on 2024 notes decrease in size of the left pulmonary nodule to 6 x 5 mm, previously 10 x 5 mm.  Given this decrease in size, recommend an additional CT scan of the chest in 1 year to monitor stability or further decrease.  We did discuss that patient has significant history of tobacco exposure but quit smoking greater than 15 years ago so does not formally qualify for ongoing lung cancer surveillance screening once we have completed surveillance of this left lower lobe pulmonary nodule.     Orders:    CT chest wo contrast; Future    Adenoma of left adrenal gland  CT scan of the chest also notes stable 2.7 cm left adrenal adenoma.  This continues to be stable and would recommend follow-up with patient's PCP.               Encounter provider Thoracic Oncology [facility] Ciera Varma PA-C    The patient was identified by name and date of birth. Hammad Meraz was informed that this is a telemedicine visit and that the visit is being conducted through the Epic Embedded platform. He agrees to proceed..  My office door was closed. No one else was in the room.  He acknowledged consent and understanding of privacy and security of the video platform. The patient has agreed to participate and understands they can discontinue the visit at any time.    Patient is aware this is a  billable service.     History of Present Illness     Hammad Meraz is a 58 y.o. male who presents today for surveillance of a left lower lobe pulmonary nodule. At time of last office visit on 9/15/2023, patient's left lower lobe pulmonary nodule slightly decreased in size/showed stability on most recent CT scan and his CT scan interval was spaced out to 1 year.     CT scan of the chest completed on 9/13/2024 was personally reviewed by myself and in PACS demonstrating interval decrease in the left lower lobe pulmonary nodule now measuring 6 x 5 mm, previously 10 x 5 mm.  There are small calcified granulomas. Stable faint tree-in-bud nodules of right upper lobe.   Stable 2.7 cm left adrenal adenoma.      On discussion today, patient reports his breathing does not affect him in performing daily activities.  The cough that initially prompted CT imaging back in 2022 has resolved.  Denies significant shortness of breath, cough, cyst, fevers, chills, unintentional weight loss. Denies recent lung infections.  Patient is planning to retire within the next year and go on a large road trip.  Smoking history: Former smoker, quit in 2007.   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        History obtained from : patient  Review of Systems   Constitutional:  Negative for chills, fever and unexpected weight change.   Respiratory:  Negative for cough and shortness of breath.        Past Medical History   Past Medical History:   Diagnosis Date    Allergic     Anxiety     Eczema     Gastric ulcer     Hypercholesterolemia     Impacted cerumen of left ear 02/27/2019    Sleep apnea      Past Surgical History:   Procedure Laterality Date    CYST REMOVAL      EGD      EXAMINATION UNDER ANESTHESIA N/A 9/24/2020    Procedure: DRUG INDUCED SLEEP ENDOSCOPY;  Surgeon: Liang Cramer MD;  Location: [facility];  Service: ENT    LASIK       Family History   Problem Relation Age of Onset     Cancer Mother         ovarian and stomach    Coronary artery disease Mother     Coronary artery disease Father     Cancer Father         pancratic     Current Outpatient Medications on File Prior to Visit   Medication Sig Dispense Refill    aspirin 81 mg chewable tablet Chew 1 tablet (81 mg total) daily      Eucrisa 2 % OINT APPLY TOPICALLY TWICE A DAY MONDAY THROUGH FRIDAY TO TRUNK AND EXTREMITIES 100 g 14    FLUoxetine (PROzac) 40 MG capsule TAKE 1 CAPSULE DAILY AT BEDTIME 90 capsule 3    hydrOXYzine HCL (ATARAX) 25 mg tablet TAKE 1 TABLET DAILY AT BEDTIME 90 tablet 3    multivitamin (THERAGRAN) TABS Take 1 tablet by mouth daily      pantoprazole (PROTONIX) 40 mg tablet TAKE 1 TABLET DAILY BEFORE BREAKFAST 90 tablet 3     No current facility-administered medications on file prior to visit.     Allergies   Allergen Reactions    Medical Tape Itching and Rash      Current Outpatient Medications on File Prior to Visit   Medication Sig Dispense Refill    aspirin 81 mg chewable tablet Chew 1 tablet (81 mg total) daily      Eucrisa 2 % OINT APPLY TOPICALLY TWICE A DAY MONDAY THROUGH FRIDAY TO TRUNK AND EXTREMITIES 100 g 14    FLUoxetine (PROzac) 40 MG capsule TAKE 1 CAPSULE DAILY AT BEDTIME 90 capsule 3    hydrOXYzine HCL (ATARAX) 25 mg tablet TAKE 1 TABLET DAILY AT BEDTIME 90 tablet 3    multivitamin (THERAGRAN) TABS Take 1 tablet by mouth daily      pantoprazole (PROTONIX) 40 mg tablet TAKE 1 TABLET DAILY BEFORE BREAKFAST 90 tablet 3     No current facility-administered medications on file prior to visit.      Social History     Tobacco Use    Smoking status: Former     Current packs/day: 0.00     Types: Cigarettes     Quit date:      Years since quittin.7    Smokeless tobacco: Never   Vaping Use    Vaping status: Never Used   Substance and Sexual Activity    Alcohol use: Yes     Comment: socially    Drug use: Never    Sexual activity: Yes     Partners: Female         Objective     There were no vitals taken  for this visit.  Physical Exam  Constitutional:       General: He is not in acute distress.  HENT:      Head: Normocephalic.      Nose: Nose normal.   Eyes:      Extraocular Movements: Extraocular movements intact.      Comments: Wearing glasses   Pulmonary:      Effort: Pulmonary effort is normal.      Comments: Unlabored respirations.  No conversational dyspnea.  Psychiatric:         Mood and Affect: Mood normal.         Thought Content: Thought content normal.         Visit Time  Total Visit Duration: 9 minutes

## 2024-09-24 ENCOUNTER — TELEPHONE (OUTPATIENT)
Dept: CARDIAC SURGERY | Facility: CLINIC | Age: 59
End: 2024-09-24

## 2024-09-24 NOTE — TELEPHONE ENCOUNTER
Spoke with patient and advised him that I will be scheduling his 1 yr CT and Virtual visit F/U appt on his behalf. Patient approved and will obtain appointment(s) details from his MYCHART.

## 2024-10-13 NOTE — ASSESSMENT & PLAN NOTE
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    -Presents without complaints.  Denies abdominal, back (has MSK back pain), or leg pain  -BP well controlled  -No known family history of AAA  -Palpable distal pulses bilaterally    AOIL 8/14/24- No significant change or progression.  There is an infrarenal fusiform abdominal aortic aneurysm measuring 3.4 x 3.6cm (3.5 x 3.8cm)  The left common iliac artery is ectatic measuring 1.8 cm.  The right common iliac and bilateral external iliac arteries are patent and normal in caliber bilaterally.  The celiac and superior mesenteric arteries are patent.  The proximal renal arteries are patent.    Plan:  -AOIL in 1 year with return office visit  -Continue daily aspirin  -BP control  -Call return to office with new or worsening symptoms, questions or concerns.  -Educated on screening 1st degree relatives at age of 50      Orders:    VAS abdominal aorta/iliac duplex; Future

## 2024-10-13 NOTE — PATIENT INSTRUCTIONS
Abdominal iliac duplex in 1 year with return office visit.   Continue Aspirin daily  BP control

## 2024-10-14 ENCOUNTER — OFFICE VISIT (OUTPATIENT)
Dept: VASCULAR SURGERY | Facility: CLINIC | Age: 59
End: 2024-10-14
Payer: COMMERCIAL

## 2024-10-14 VITALS
DIASTOLIC BLOOD PRESSURE: 78 MMHG | HEART RATE: 100 BPM | SYSTOLIC BLOOD PRESSURE: 118 MMHG | BODY MASS INDEX: 29.52 KG/M2 | HEIGHT: 74 IN | WEIGHT: 230 LBS

## 2024-10-14 DIAGNOSIS — I71.43 INFRARENAL ABDOMINAL AORTIC ANEURYSM (AAA) WITHOUT RUPTURE (HCC): Primary | ICD-10-CM

## 2024-10-14 PROCEDURE — 99213 OFFICE O/P EST LOW 20 MIN: CPT | Performed by: NURSE PRACTITIONER

## 2024-10-14 NOTE — PROGRESS NOTES
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    -Presents without complaints.  Denies abdominal, back (has MSK back pain), or leg pain  -BP well controlled  -No known family history of AAA  -Palpable distal pulses bilaterally    AOIL 24- No significant change or progression.  There is an infrarenal fusiform abdominal aortic aneurysm measuring 3.4 x 3.6cm (3.5 x 3.8cm)  The left common iliac artery is ectatic measuring 1.8 cm.  The right common iliac and bilateral external iliac arteries are patent and normal in caliber bilaterally.  The celiac and superior mesenteric arteries are patent.  The proximal renal arteries are patent.    Plan:  -AOIL in 1 year with return office visit  -Continue daily aspirin  -BP control  -Call return to office with new or worsening symptoms, questions or concerns.  -Educated on screening 1st degree relatives at age of 50      Orders:    VAS abdominal aorta/iliac duplex; Future      History of Present Illness     Patient presents to review AOIL for known AAA. Patient admits to chronic back pain.      Hammad Meraz is a 58 y.o. male who presents to review yearly abdominal iliac imaging.    Presents without complaints.  Denies abdominal or leg pain.  Endorses chronic low back pain, musculoskeletal.  Patient has done stretching PT in past which improves pain however he does not continue with exercises.    BP well-controlled    Maintained on daily aspirin  Former smoker    No recent illnesses or hospitalizations.  Imaging reviewed without significant change or progression of disease.  Will  continue to follow q. yearly duplex imaging.        History obtained from : patient  Review of Systems   Constitutional: Negative.    HENT: Negative.     Eyes: Negative.    Respiratory: Negative.     Cardiovascular: Negative.    Gastrointestinal: Negative.    Endocrine: Negative.    Genitourinary: Negative.    Musculoskeletal: Negative.    Skin: Negative.    Allergic/Immunologic: Negative.    Neurological: Negative.    Hematological: Negative.    Psychiatric/Behavioral: Negative.       I have reviewed and made appropriate changes to the review of systems input by the medical assistant.    Medical History Reviewed by provider this encounter:       Past Medical History   Past Medical History:   Diagnosis Date    Allergic     Anxiety     Eczema     Gastric ulcer     Hypercholesterolemia     Impacted cerumen of left ear 02/27/2019    Sleep apnea      Past Surgical History:   Procedure Laterality Date    CYST REMOVAL      EGD      EXAMINATION UNDER ANESTHESIA N/A 9/24/2020    Procedure: DRUG INDUCED SLEEP ENDOSCOPY;  Surgeon: Liang Cramer MD;  Location: [facility];  Service: ENT    LASIK       Family History   Problem Relation Age of Onset    Cancer Mother         ovarian and stomach    Coronary artery disease Mother     Coronary artery disease Father     Cancer Father         pancratic     Current Outpatient Medications on File Prior to Visit   Medication Sig Dispense Refill    aspirin 81 mg chewable tablet Chew 1 tablet (81 mg total) daily      Eucrisa 2 % OINT APPLY TOPICALLY TWICE A DAY MONDAY THROUGH FRIDAY TO TRUNK AND EXTREMITIES 100 g 14    FLUoxetine (PROzac) 40 MG capsule TAKE 1 CAPSULE DAILY AT BEDTIME 90 capsule 3    hydrOXYzine HCL (ATARAX) 25 mg tablet TAKE 1 TABLET DAILY AT BEDTIME 90 tablet 3    multivitamin (THERAGRAN) TABS Take 1 tablet by mouth daily      pantoprazole (PROTONIX) 40 mg tablet TAKE 1 TABLET DAILY BEFORE BREAKFAST 90 tablet 3     No current facility-administered medications on file  "prior to visit.     Allergies   Allergen Reactions    Medical Tape Itching and Rash      Current Outpatient Medications on File Prior to Visit   Medication Sig Dispense Refill    aspirin 81 mg chewable tablet Chew 1 tablet (81 mg total) daily      Eucrisa 2 % OINT APPLY TOPICALLY TWICE A DAY MONDAY THROUGH FRIDAY TO TRUNK AND EXTREMITIES 100 g 14    FLUoxetine (PROzac) 40 MG capsule TAKE 1 CAPSULE DAILY AT BEDTIME 90 capsule 3    hydrOXYzine HCL (ATARAX) 25 mg tablet TAKE 1 TABLET DAILY AT BEDTIME 90 tablet 3    multivitamin (THERAGRAN) TABS Take 1 tablet by mouth daily      pantoprazole (PROTONIX) 40 mg tablet TAKE 1 TABLET DAILY BEFORE BREAKFAST 90 tablet 3     No current facility-administered medications on file prior to visit.      Social History     Tobacco Use    Smoking status: Former     Current packs/day: 0.00     Types: Cigarettes     Quit date:      Years since quittin.7    Smokeless tobacco: Never   Vaping Use    Vaping status: Never Used   Substance and Sexual Activity    Alcohol use: Yes     Comment: socially    Drug use: Never    Sexual activity: Yes     Partners: Female         Objective     /78 (BP Location: Left arm, Patient Position: Sitting, Cuff Size: Standard)   Pulse 100   Ht 6' 2\" (1.88 m)   Wt 104 kg (230 lb)   BMI 29.53 kg/m²     Physical Exam  Vitals reviewed.   Constitutional:       General: He is not in acute distress.     Appearance: Normal appearance.   HENT:      Head: Normocephalic and atraumatic.   Cardiovascular:      Rate and Rhythm: Normal rate.      Pulses:           Radial pulses are 2+ on the right side and 2+ on the left side.        Dorsalis pedis pulses are 2+ on the right side and 2+ on the left side.        Posterior tibial pulses are 1+ on the right side and 2+ on the left side.   Pulmonary:      Effort: Pulmonary effort is normal. No respiratory distress.   Abdominal:      Palpations: Abdomen is soft. There is no mass.      Tenderness: There is no " abdominal tenderness.   Musculoskeletal:         General: Normal range of motion.      Right lower leg: No edema.      Left lower leg: No edema.   Skin:     General: Skin is warm.      Capillary Refill: Capillary refill takes less than 2 seconds.   Neurological:      Mental Status: He is alert and oriented to person, place, and time.      Sensory: No sensory deficit.      Motor: No weakness.   Psychiatric:         Behavior: Behavior normal.       Administrative Statements   I have spent a total time of 16 minutes in caring for this patient on the day of the visit/encounter including Diagnostic results, Risks and benefits of tx options, Instructions for management, Patient and family education, Risk factor reductions, Impressions, Documenting in the medical record, Reviewing / ordering tests, medicine, procedures  , and Obtaining or reviewing history  .

## 2024-12-09 DIAGNOSIS — R12 HEART BURN: ICD-10-CM

## 2024-12-11 RX ORDER — PANTOPRAZOLE SODIUM 40 MG/1
40 TABLET, DELAYED RELEASE ORAL
Qty: 90 TABLET | Refills: 0 | Status: SHIPPED | OUTPATIENT
Start: 2024-12-11

## 2024-12-12 NOTE — TELEPHONE ENCOUNTER
Cell phone number kept ringing. No machine to leave a message. Home phone number is not in service.

## 2025-03-10 DIAGNOSIS — R12 HEART BURN: ICD-10-CM

## 2025-03-12 RX ORDER — PANTOPRAZOLE SODIUM 40 MG/1
40 TABLET, DELAYED RELEASE ORAL
Qty: 90 TABLET | Refills: 3 | Status: SHIPPED | OUTPATIENT
Start: 2025-03-12

## 2025-04-23 DIAGNOSIS — F41.9 ANXIETY: ICD-10-CM

## 2025-04-23 RX ORDER — FLUOXETINE HYDROCHLORIDE 40 MG/1
40 CAPSULE ORAL
Qty: 90 CAPSULE | Refills: 0 | Status: SHIPPED | OUTPATIENT
Start: 2025-04-23

## 2025-04-28 ENCOUNTER — OFFICE VISIT (OUTPATIENT)
Dept: FAMILY MEDICINE CLINIC | Facility: CLINIC | Age: 60
End: 2025-04-28
Payer: COMMERCIAL

## 2025-04-28 VITALS
DIASTOLIC BLOOD PRESSURE: 68 MMHG | TEMPERATURE: 97.8 F | HEART RATE: 68 BPM | WEIGHT: 251.6 LBS | BODY MASS INDEX: 32.29 KG/M2 | SYSTOLIC BLOOD PRESSURE: 116 MMHG | HEIGHT: 74 IN | OXYGEN SATURATION: 97 %

## 2025-04-28 DIAGNOSIS — F41.9 ANXIETY: Primary | ICD-10-CM

## 2025-04-28 DIAGNOSIS — L20.89 OTHER ATOPIC DERMATITIS: ICD-10-CM

## 2025-04-28 DIAGNOSIS — Z12.5 SCREENING FOR PROSTATE CANCER: ICD-10-CM

## 2025-04-28 DIAGNOSIS — R53.83 OTHER FATIGUE: ICD-10-CM

## 2025-04-28 DIAGNOSIS — Z12.11 SCREENING FOR COLON CANCER: ICD-10-CM

## 2025-04-28 DIAGNOSIS — E78.2 MIXED HYPERLIPIDEMIA: ICD-10-CM

## 2025-04-28 DIAGNOSIS — Z13.1 SCREENING FOR DIABETES MELLITUS: ICD-10-CM

## 2025-04-28 PROCEDURE — 99213 OFFICE O/P EST LOW 20 MIN: CPT | Performed by: FAMILY MEDICINE

## 2025-04-28 RX ORDER — CRISABOROLE 20 MG/G
OINTMENT TOPICAL
Qty: 100 G | Refills: 14 | Status: SHIPPED | OUTPATIENT
Start: 2025-04-28

## 2025-04-28 RX ORDER — ROSUVASTATIN CALCIUM 20 MG/1
20 TABLET, COATED ORAL DAILY
Start: 2025-04-28

## 2025-04-28 NOTE — PROGRESS NOTES
"Name: Hammad Meraz      : 1965      MRN: [id number]  Encounter Provider: Irving Gonzalez MD  Encounter Date: 2025   Encounter department: [facility]  :  Assessment & Plan  Anxiety  Stable controlled       Other atopic dermatitis    Orders:  •  Eucrisa 2 % OINT; APPLY TOPICALLY TWICE A DAY MONDAY THROUGH FRIDAY TO TRUNK AND EXTREMITIES    Mixed hyperlipidemia  He has agreed to re-start statin  Orders:  •  Lipid panel; Future  •  rosuvastatin (CRESTOR) 20 MG tablet; Take 1 tablet (20 mg total) by mouth daily    Screening for prostate cancer    Orders:  •  PSA, Total Screen; Future    Other fatigue    Orders:  •  CBC and differential; Future    Screening for diabetes mellitus    Orders:  •  Comprehensive metabolic panel; Future    Screening for colon cancer    Orders:  •  Cologuard    F/U in 1 year       History of Present Illness   Patient is here for a follow up.  Has a hx of anxiety and takes Prozac daily and hydroxyzine as needed.  Also has atopic dermatitis and uses Eucrisa which helps.  He is at increased risk for heart disease abnormal CT scan coronary in the past. Borderline elevated cholesterol.      Review of Systems   Constitutional:  Negative for activity change, appetite change, fatigue and fever.   HENT:  Negative for congestion and ear discharge.    Respiratory:  Negative for cough and shortness of breath.    Cardiovascular:  Negative for chest pain and palpitations.   Gastrointestinal:  Negative for diarrhea and nausea.   Musculoskeletal:  Negative for arthralgias and back pain.   Skin:  Negative for color change and rash.   Neurological:  Negative for dizziness and headaches.   Psychiatric/Behavioral:  Negative for agitation and behavioral problems. The patient is nervous/anxious and is hyperactive.        Objective   /68   Pulse 68   Temp 97.8 °F (36.6 °C)   Ht 6' 2\" (1.88 m)   Wt 114 kg (251 lb 9.6 oz)   SpO2 97%   BMI 32.30 kg/m²      Physical " Exam  Constitutional:       General: He is not in acute distress.     Appearance: He is well-developed. He is not diaphoretic.   Eyes:      General: No scleral icterus.     Pupils: Pupils are equal, round, and reactive to light.   Cardiovascular:      Rate and Rhythm: Normal rate and regular rhythm.      Heart sounds: Normal heart sounds. No murmur heard.  Pulmonary:      Effort: Pulmonary effort is normal. No respiratory distress.      Breath sounds: Normal breath sounds. No wheezing.   Abdominal:      General: Bowel sounds are normal. There is no distension.      Palpations: Abdomen is soft.      Tenderness: There is no abdominal tenderness.   Skin:     General: Skin is warm and dry.      Findings: No rash.   Neurological:      Mental Status: He is alert and oriented to person, place, and time.

## 2025-04-28 NOTE — ASSESSMENT & PLAN NOTE
Orders:  •  Eucrisa 2 % OINT; APPLY TOPICALLY TWICE A DAY MONDAY THROUGH FRIDAY TO TRUNK AND EXTREMITIES

## 2025-04-28 NOTE — ASSESSMENT & PLAN NOTE
He has agreed to re-start statin  Orders:  •  Lipid panel; Future  •  rosuvastatin (CRESTOR) 20 MG tablet; Take 1 tablet (20 mg total) by mouth daily

## 2025-05-13 LAB — COLOGUARD RESULT REPORTABLE: NEGATIVE

## 2025-05-15 ENCOUNTER — RESULTS FOLLOW-UP (OUTPATIENT)
Dept: FAMILY MEDICINE CLINIC | Facility: CLINIC | Age: 60
End: 2025-05-15

## 2025-07-22 DIAGNOSIS — F41.9 ANXIETY: ICD-10-CM

## 2025-07-22 RX ORDER — FLUOXETINE HYDROCHLORIDE 40 MG/1
40 CAPSULE ORAL
Qty: 90 CAPSULE | Refills: 1 | Status: SHIPPED | OUTPATIENT
Start: 2025-07-22

## (undated) DEVICE — GLOVE INDICATOR PI UNDERGLOVE SZ 7.5 BLUE

## (undated) DEVICE — AMBU ASCOPE 4 RHINOLARYNGO SLIM SINGLE-USE, FLEXIBLE RHINOLARYNGOSCOPE STERILE FROM PACKAGE. INSERTION CORD DIAMETER 3.0 MM, LENGHT OF 300 MM. AND A 130-DEGREE BENDING ANGLE. MINIMUM PURCHASE 5 PCS = 1 BOX: Brand: ASCOPE™ 4 RHINOLARYNGO SLIM